# Patient Record
Sex: FEMALE | Race: WHITE | NOT HISPANIC OR LATINO | URBAN - METROPOLITAN AREA
[De-identification: names, ages, dates, MRNs, and addresses within clinical notes are randomized per-mention and may not be internally consistent; named-entity substitution may affect disease eponyms.]

---

## 2018-02-04 ENCOUNTER — EMERGENCY (EMERGENCY)
Facility: HOSPITAL | Age: 83
LOS: 1 days | Discharge: HOME | End: 2018-02-04
Attending: EMERGENCY MEDICINE | Admitting: EMERGENCY MEDICINE
Payer: MEDICARE

## 2018-02-04 VITALS
RESPIRATION RATE: 18 BRPM | SYSTOLIC BLOOD PRESSURE: 141 MMHG | DIASTOLIC BLOOD PRESSURE: 80 MMHG | OXYGEN SATURATION: 98 % | HEART RATE: 76 BPM | TEMPERATURE: 98 F

## 2018-02-04 VITALS
DIASTOLIC BLOOD PRESSURE: 70 MMHG | HEART RATE: 81 BPM | SYSTOLIC BLOOD PRESSURE: 168 MMHG | RESPIRATION RATE: 16 BRPM | OXYGEN SATURATION: 99 %

## 2018-02-04 LAB
ALBUMIN SERPL ELPH-MCNC: 3.7 G/DL — SIGNIFICANT CHANGE UP (ref 3.3–5)
ALP SERPL-CCNC: 105 U/L — SIGNIFICANT CHANGE UP (ref 40–120)
ALT FLD-CCNC: 18 U/L — SIGNIFICANT CHANGE UP (ref 4–33)
APTT BLD: 28.7 SEC — SIGNIFICANT CHANGE UP (ref 27.5–37.4)
AST SERPL-CCNC: 21 U/L — SIGNIFICANT CHANGE UP (ref 4–32)
BASOPHILS # BLD AUTO: 0.03 K/UL — SIGNIFICANT CHANGE UP (ref 0–0.2)
BASOPHILS NFR BLD AUTO: 0.3 % — SIGNIFICANT CHANGE UP (ref 0–2)
BILIRUB SERPL-MCNC: 0.4 MG/DL — SIGNIFICANT CHANGE UP (ref 0.2–1.2)
BUN SERPL-MCNC: 21 MG/DL — SIGNIFICANT CHANGE UP (ref 7–23)
CALCIUM SERPL-MCNC: 9.4 MG/DL — SIGNIFICANT CHANGE UP (ref 8.4–10.5)
CHLORIDE SERPL-SCNC: 98 MMOL/L — SIGNIFICANT CHANGE UP (ref 98–107)
CK MB BLD-MCNC: 2.3 NG/ML — SIGNIFICANT CHANGE UP (ref 1–4.7)
CK SERPL-CCNC: 28 U/L — SIGNIFICANT CHANGE UP (ref 25–170)
CO2 SERPL-SCNC: 28 MMOL/L — SIGNIFICANT CHANGE UP (ref 22–31)
CREAT SERPL-MCNC: 0.8 MG/DL — SIGNIFICANT CHANGE UP (ref 0.5–1.3)
EOSINOPHIL # BLD AUTO: 0.16 K/UL — SIGNIFICANT CHANGE UP (ref 0–0.5)
EOSINOPHIL NFR BLD AUTO: 1.8 % — SIGNIFICANT CHANGE UP (ref 0–6)
GLUCOSE SERPL-MCNC: 190 MG/DL — HIGH (ref 70–99)
HCT VFR BLD CALC: 45.8 % — HIGH (ref 34.5–45)
HGB BLD-MCNC: 14.8 G/DL — SIGNIFICANT CHANGE UP (ref 11.5–15.5)
IMM GRANULOCYTES # BLD AUTO: 0.03 # — SIGNIFICANT CHANGE UP
IMM GRANULOCYTES NFR BLD AUTO: 0.3 % — SIGNIFICANT CHANGE UP (ref 0–1.5)
INR BLD: 0.99 — SIGNIFICANT CHANGE UP (ref 0.88–1.17)
LYMPHOCYTES # BLD AUTO: 1.17 K/UL — SIGNIFICANT CHANGE UP (ref 1–3.3)
LYMPHOCYTES # BLD AUTO: 13.3 % — SIGNIFICANT CHANGE UP (ref 13–44)
MCHC RBC-ENTMCNC: 29.6 PG — SIGNIFICANT CHANGE UP (ref 27–34)
MCHC RBC-ENTMCNC: 32.3 % — SIGNIFICANT CHANGE UP (ref 32–36)
MCV RBC AUTO: 91.6 FL — SIGNIFICANT CHANGE UP (ref 80–100)
MONOCYTES # BLD AUTO: 0.38 K/UL — SIGNIFICANT CHANGE UP (ref 0–0.9)
MONOCYTES NFR BLD AUTO: 4.3 % — SIGNIFICANT CHANGE UP (ref 2–14)
NEUTROPHILS # BLD AUTO: 7.03 K/UL — SIGNIFICANT CHANGE UP (ref 1.8–7.4)
NEUTROPHILS NFR BLD AUTO: 80 % — HIGH (ref 43–77)
NRBC # FLD: 0 — SIGNIFICANT CHANGE UP
PLATELET # BLD AUTO: 252 K/UL — SIGNIFICANT CHANGE UP (ref 150–400)
PMV BLD: 10.3 FL — SIGNIFICANT CHANGE UP (ref 7–13)
POTASSIUM SERPL-MCNC: 4.6 MMOL/L — SIGNIFICANT CHANGE UP (ref 3.5–5.3)
POTASSIUM SERPL-SCNC: 4.6 MMOL/L — SIGNIFICANT CHANGE UP (ref 3.5–5.3)
PROT SERPL-MCNC: 7.3 G/DL — SIGNIFICANT CHANGE UP (ref 6–8.3)
PROTHROM AB SERPL-ACNC: 11 SEC — SIGNIFICANT CHANGE UP (ref 9.8–13.1)
RBC # BLD: 5 M/UL — SIGNIFICANT CHANGE UP (ref 3.8–5.2)
RBC # FLD: 14.6 % — HIGH (ref 10.3–14.5)
SODIUM SERPL-SCNC: 139 MMOL/L — SIGNIFICANT CHANGE UP (ref 135–145)
TROPONIN T SERPL-MCNC: < 0.06 NG/ML — SIGNIFICANT CHANGE UP (ref 0–0.06)
WBC # BLD: 8.8 K/UL — SIGNIFICANT CHANGE UP (ref 3.8–10.5)
WBC # FLD AUTO: 8.8 K/UL — SIGNIFICANT CHANGE UP (ref 3.8–10.5)

## 2018-02-04 PROCEDURE — 70450 CT HEAD/BRAIN W/O DYE: CPT | Mod: 26

## 2018-02-04 PROCEDURE — 71045 X-RAY EXAM CHEST 1 VIEW: CPT | Mod: 26

## 2018-02-04 PROCEDURE — 72170 X-RAY EXAM OF PELVIS: CPT | Mod: 26

## 2018-02-04 PROCEDURE — 12002 RPR S/N/AX/GEN/TRNK2.6-7.5CM: CPT

## 2018-02-04 PROCEDURE — 72125 CT NECK SPINE W/O DYE: CPT | Mod: 26

## 2018-02-04 PROCEDURE — 99285 EMERGENCY DEPT VISIT HI MDM: CPT | Mod: 25,GC

## 2018-02-04 RX ORDER — TETANUS TOXOID, REDUCED DIPHTHERIA TOXOID AND ACELLULAR PERTUSSIS VACCINE, ADSORBED 5; 2.5; 8; 8; 2.5 [IU]/.5ML; [IU]/.5ML; UG/.5ML; UG/.5ML; UG/.5ML
0.5 SUSPENSION INTRAMUSCULAR ONCE
Qty: 0 | Refills: 0 | Status: COMPLETED | OUTPATIENT
Start: 2018-02-04 | End: 2018-02-04

## 2018-02-04 RX ORDER — OXYCODONE AND ACETAMINOPHEN 5; 325 MG/1; MG/1
1 TABLET ORAL ONCE
Qty: 0 | Refills: 0 | Status: DISCONTINUED | OUTPATIENT
Start: 2018-02-04 | End: 2018-02-04

## 2018-02-04 RX ADMIN — OXYCODONE AND ACETAMINOPHEN 1 TABLET(S): 5; 325 TABLET ORAL at 18:46

## 2018-02-04 RX ADMIN — TETANUS TOXOID, REDUCED DIPHTHERIA TOXOID AND ACELLULAR PERTUSSIS VACCINE, ADSORBED 0.5 MILLILITER(S): 5; 2.5; 8; 8; 2.5 SUSPENSION INTRAMUSCULAR at 14:47

## 2018-02-04 NOTE — ED ADULT NURSE NOTE - OBJECTIVE STATEMENT
Pt received a&ox3, pt c/o fall earlier today, unknown origin, pt unable to recall what occurred, small lac noted to posterior aspect of pts head, md escobedo at bedside, pt denies pain, all extremities with full range of motion, pt denies cp/sob/cold symptoms/NVD/urinary symptoms before or after fall, pt appears comfortable, no bruising or deformities noted to body, pt appears to be in NAD, vs as reported, 20 gauge IV placed in left ac, labs drawn and sent, pt placed on monitor.

## 2018-02-04 NOTE — ED PROVIDER NOTE - PLAN OF CARE
-- Follow up with your primary doctor (or come back to the ED) within 48-72 hours for wound check.   -- Keep staples dry for 2 days, then clean with soap/water, and apply bacitracin once daily.  -- Return to ED for suture removal in 7-10 days.   -- Any increased pain, redness, streaking (red lines), swelling, fever, chills please immediately return to ER.

## 2018-02-04 NOTE — ED ADULT TRIAGE NOTE - CHIEF COMPLAINT QUOTE
p/t on Plavix,  c/o of lac to back of head s/p fall this afternoon neg loc p/t c/o of buttock pain as well

## 2018-02-04 NOTE — PROVIDER CONTACT NOTE (OTHER) - ASSESSMENT
Medical team referred this case as pt needs a ride home. Case was discussed with the medical team a informed pt needs BLS due to fall risk.  Writer called [facility] [phone] spoke with Bill  to set up ambulance service and pt will be picked up around 10: pm by senior care.  Medical team and pt's family was notified  time. Non Emergent ambulance form has been signed by MD and attached to the pts envelope for EMS.

## 2018-02-04 NOTE — ED PROVIDER NOTE - CARE PLAN
Principal Discharge DX:	Fall  Assessment and plan of treatment:	-- Follow up with your primary doctor (or come back to the ED) within 48-72 hours for wound check.   -- Keep staples dry for 2 days, then clean with soap/water, and apply bacitracin once daily.  -- Return to ED for suture removal in 7-10 days.   -- Any increased pain, redness, streaking (red lines), swelling, fever, chills please immediately return to ER.

## 2018-02-04 NOTE — ED PROVIDER NOTE - ATTENDING CONTRIBUTION TO CARE
Attending Note (Sushil): patient complaining of fall. unclear if this was syncope or not. patient denies prodromal symptoms but does not know how she fell.  posterior scalp laceration.  labs, ct head, wound repair, reassess.

## 2018-02-04 NOTE — ED PROVIDER NOTE - OBJECTIVE STATEMENT
89F h/o HTN, HLD, DM, CAD s/p stent on SA/plavix presents s/p fall. Was walking with walker into kitchen and fell backwards (unsure why),  tried catching her but he fell as well. Lac to occiput, denies additional injuries. Denies SOB, CP, Fever, palpitations, weakness, LOC, vomiting, back pain, neck pain. Was in usual state of health this morning. 89F h/o HTN, HLD, DM, CAD s/p stent on SA/plavix presents s/p fall. Was walking with walker into kitchen and fell backwards (unsure why),  tried catching her but he fell as well. Lac to occiput, denies additional injuries. Denies SOB, CP, Fever, palpitations, weakness, LOC, vomiting, back pain, neck pain. Was in usual state of health this morning. Unknown last tetanus

## 2018-02-15 ENCOUNTER — TRANSCRIPTION ENCOUNTER (OUTPATIENT)
Age: 83
End: 2018-02-15

## 2018-07-22 ENCOUNTER — EMERGENCY (EMERGENCY)
Facility: HOSPITAL | Age: 83
LOS: 1 days | Discharge: HOME | End: 2018-07-22
Attending: EMERGENCY MEDICINE | Admitting: EMERGENCY MEDICINE
Payer: MEDICARE

## 2018-07-22 VITALS
HEART RATE: 71 BPM | DIASTOLIC BLOOD PRESSURE: 82 MMHG | SYSTOLIC BLOOD PRESSURE: 150 MMHG | RESPIRATION RATE: 16 BRPM | OXYGEN SATURATION: 100 % | TEMPERATURE: 98 F

## 2018-07-22 LAB
ALBUMIN SERPL ELPH-MCNC: 4 G/DL — SIGNIFICANT CHANGE UP (ref 3.3–5)
ALP SERPL-CCNC: 129 U/L — HIGH (ref 40–120)
ALT FLD-CCNC: 33 U/L — SIGNIFICANT CHANGE UP (ref 10–45)
ANION GAP SERPL CALC-SCNC: 13 MMOL/L — SIGNIFICANT CHANGE UP (ref 5–17)
APTT BLD: 25.2 SEC — LOW (ref 27.5–37.4)
AST SERPL-CCNC: 25 U/L — SIGNIFICANT CHANGE UP (ref 10–40)
BASE EXCESS BLDV CALC-SCNC: 2.4 MMOL/L — HIGH (ref -2–2)
BASE EXCESS BLDV CALC-SCNC: 4.1 MMOL/L — HIGH (ref -2–2)
BILIRUB SERPL-MCNC: 0.4 MG/DL — SIGNIFICANT CHANGE UP (ref 0.2–1.2)
BLD GP AB SCN SERPL QL: NEGATIVE — SIGNIFICANT CHANGE UP
BUN SERPL-MCNC: 24 MG/DL — HIGH (ref 7–23)
CA-I SERPL-SCNC: 1.15 MMOL/L — SIGNIFICANT CHANGE UP (ref 1.12–1.3)
CA-I SERPL-SCNC: 1.18 MMOL/L — SIGNIFICANT CHANGE UP (ref 1.12–1.3)
CALCIUM SERPL-MCNC: 10.1 MG/DL — SIGNIFICANT CHANGE UP (ref 8.4–10.5)
CHLORIDE BLDV-SCNC: 103 MMOL/L — SIGNIFICANT CHANGE UP (ref 96–108)
CHLORIDE BLDV-SCNC: 104 MMOL/L — SIGNIFICANT CHANGE UP (ref 96–108)
CHLORIDE SERPL-SCNC: 98 MMOL/L — SIGNIFICANT CHANGE UP (ref 96–108)
CO2 BLDV-SCNC: 30 MMOL/L — SIGNIFICANT CHANGE UP (ref 22–30)
CO2 BLDV-SCNC: 32 MMOL/L — HIGH (ref 22–30)
CO2 SERPL-SCNC: 27 MMOL/L — SIGNIFICANT CHANGE UP (ref 22–31)
CREAT SERPL-MCNC: 0.76 MG/DL — SIGNIFICANT CHANGE UP (ref 0.5–1.3)
GAS PNL BLDV: 135 MMOL/L — LOW (ref 136–145)
GAS PNL BLDV: 137 MMOL/L — SIGNIFICANT CHANGE UP (ref 136–145)
GAS PNL BLDV: SIGNIFICANT CHANGE UP
GLUCOSE BLDV-MCNC: 189 MG/DL — HIGH (ref 70–99)
GLUCOSE BLDV-MCNC: 202 MG/DL — HIGH (ref 70–99)
GLUCOSE SERPL-MCNC: 212 MG/DL — HIGH (ref 70–99)
HCO3 BLDV-SCNC: 29 MMOL/L — SIGNIFICANT CHANGE UP (ref 21–29)
HCO3 BLDV-SCNC: 31 MMOL/L — HIGH (ref 21–29)
HCT VFR BLD CALC: 44.1 % — SIGNIFICANT CHANGE UP (ref 34.5–45)
HCT VFR BLDA CALC: 39 % — SIGNIFICANT CHANGE UP (ref 39–50)
HCT VFR BLDA CALC: 47 % — SIGNIFICANT CHANGE UP (ref 39–50)
HGB BLD CALC-MCNC: 12.6 G/DL — SIGNIFICANT CHANGE UP (ref 11.5–15.5)
HGB BLD CALC-MCNC: 15.4 G/DL — SIGNIFICANT CHANGE UP (ref 11.5–15.5)
HGB BLD-MCNC: 14.4 G/DL — SIGNIFICANT CHANGE UP (ref 11.5–15.5)
HOROWITZ INDEX BLDV+IHG-RTO: SIGNIFICANT CHANGE UP
INR BLD: 0.99 RATIO — SIGNIFICANT CHANGE UP (ref 0.88–1.16)
LACTATE BLDV-MCNC: 2.4 MMOL/L — HIGH (ref 0.7–2)
LACTATE BLDV-MCNC: 2.5 MMOL/L — HIGH (ref 0.7–2)
MCHC RBC-ENTMCNC: 30.1 PG — SIGNIFICANT CHANGE UP (ref 27–34)
MCHC RBC-ENTMCNC: 32.7 GM/DL — SIGNIFICANT CHANGE UP (ref 32–36)
MCV RBC AUTO: 92.3 FL — SIGNIFICANT CHANGE UP (ref 80–100)
PCO2 BLDV: 55 MMHG — HIGH (ref 35–50)
PCO2 BLDV: 56 MMHG — HIGH (ref 35–50)
PH BLDV: 7.34 — LOW (ref 7.35–7.45)
PH BLDV: 7.36 — SIGNIFICANT CHANGE UP (ref 7.35–7.45)
PLATELET # BLD AUTO: 203 K/UL — SIGNIFICANT CHANGE UP (ref 150–400)
PO2 BLDV: 13 MMHG — LOW (ref 25–45)
PO2 BLDV: 15 MMHG — LOW (ref 25–45)
POTASSIUM BLDV-SCNC: 4.6 MMOL/L — SIGNIFICANT CHANGE UP (ref 3.5–5.3)
POTASSIUM BLDV-SCNC: 4.7 MMOL/L — SIGNIFICANT CHANGE UP (ref 3.5–5.3)
POTASSIUM SERPL-MCNC: 5.6 MMOL/L — HIGH (ref 3.5–5.3)
POTASSIUM SERPL-SCNC: 5.6 MMOL/L — HIGH (ref 3.5–5.3)
PROT SERPL-MCNC: 8.1 G/DL — SIGNIFICANT CHANGE UP (ref 6–8.3)
PROTHROM AB SERPL-ACNC: 10.8 SEC — SIGNIFICANT CHANGE UP (ref 9.8–12.7)
RBC # BLD: 4.78 M/UL — SIGNIFICANT CHANGE UP (ref 3.8–5.2)
RBC # FLD: 13.2 % — SIGNIFICANT CHANGE UP (ref 10.3–14.5)
RH IG SCN BLD-IMP: POSITIVE — SIGNIFICANT CHANGE UP
SAO2 % BLDV: 11 % — LOW (ref 67–88)
SAO2 % BLDV: 12 % — LOW (ref 67–88)
SODIUM SERPL-SCNC: 138 MMOL/L — SIGNIFICANT CHANGE UP (ref 135–145)
WBC # BLD: 11 K/UL — HIGH (ref 3.8–10.5)
WBC # FLD AUTO: 11 K/UL — HIGH (ref 3.8–10.5)

## 2018-07-22 PROCEDURE — 93010 ELECTROCARDIOGRAM REPORT: CPT

## 2018-07-22 PROCEDURE — 70450 CT HEAD/BRAIN W/O DYE: CPT | Mod: 26

## 2018-07-22 PROCEDURE — 71045 X-RAY EXAM CHEST 1 VIEW: CPT | Mod: 26

## 2018-07-22 PROCEDURE — 99284 EMERGENCY DEPT VISIT MOD MDM: CPT | Mod: GC,25

## 2018-07-22 RX ORDER — TETANUS TOXOID, REDUCED DIPHTHERIA TOXOID AND ACELLULAR PERTUSSIS VACCINE, ADSORBED 5; 2.5; 8; 8; 2.5 [IU]/.5ML; [IU]/.5ML; UG/.5ML; UG/.5ML; UG/.5ML
0.5 SUSPENSION INTRAMUSCULAR ONCE
Refills: 0 | Status: COMPLETED | OUTPATIENT
Start: 2018-07-22 | End: 2018-07-22

## 2018-07-22 RX ORDER — LEVETIRACETAM 250 MG/1
1000 TABLET, FILM COATED ORAL ONCE
Refills: 0 | Status: COMPLETED | OUTPATIENT
Start: 2018-07-22 | End: 2018-07-22

## 2018-07-22 RX ORDER — ONDANSETRON 8 MG/1
4 TABLET, FILM COATED ORAL ONCE
Refills: 0 | Status: COMPLETED | OUTPATIENT
Start: 2018-07-22 | End: 2018-07-22

## 2018-07-22 RX ADMIN — TETANUS TOXOID, REDUCED DIPHTHERIA TOXOID AND ACELLULAR PERTUSSIS VACCINE, ADSORBED 0.5 MILLILITER(S): 5; 2.5; 8; 8; 2.5 SUSPENSION INTRAMUSCULAR at 20:19

## 2018-07-22 RX ADMIN — ONDANSETRON 4 MILLIGRAM(S): 8 TABLET, FILM COATED ORAL at 20:19

## 2018-07-22 RX ADMIN — LEVETIRACETAM 400 MILLIGRAM(S): 250 TABLET, FILM COATED ORAL at 21:10

## 2018-07-22 NOTE — ED PROVIDER NOTE - PHYSICAL EXAMINATION
Charlotte Antonio, DO:   Gen: Well appearing, NAD  Head: NCAT  HEENT: PERRL, MMM, normal conjunctiva, anicteric, neck supple  Lung: CTAB, no adventitious sounds  CV: RRR  Abd: soft, NTND, no rebound or guarding  MSK: No edema, no visible deformities  Neuro: CN II-XII intact. 5/5 strength and normal sensation in all extremities. No drift. No dysmetria. AAOx3.   Skin: Warm and dry, no evidence of rash  Psych: normal mood and affect

## 2018-07-22 NOTE — CONSULT NOTE ADULT - SUBJECTIVE AND OBJECTIVE BOX
p ([id number])     HPI: 89F with hx of multiple falls on ASA 81mg & Plavix for cardiac stent here for fall while ambulating to bathroom. Unwitnessed, heard by aide. Ambulatory since incident. Unknown if she was walking with walker. Remembers fall. No LOC. No nausea/vomiting. Last tetanus unknown.     PAST MEDICAL HISTORY     PAST SURGICAL HISTORY         MEDICATIONS:  Antibiotics:    Neuro:    Anticoagulation:    Other:  diphtheria/tetanus/pertussis (acellular) Vaccine (ADAcel) 0.5 milliLiter(s) IntraMuscular once      SOCIAL HISTORY:   Occupation:   Marital Status:     FAMILY HISTORY:      REVIEW OF SYSTEMS:  Check here if all are normal other than Neurological [x]  General:  Eyes:  ENT:  Cardiac:  Respiratory:  GI:  Musculoskeletal:   Skin:  Neurologic:   Psychiatric:     PHYSICAL EXAMINATION:   T(C): --  HR: --  BP: --  RR: --  SpO2: --  Wt(kg): --    General Examination:     Neurologic Examination:           AOx3, FC, PERRL, EOMI, V1-3 intact, no facial, palate debbie symmetric, tongue midline, shrug 5/5  5/5 throughout, no drift  SILT  No clonus or babinski      LABS:                RADIOLOGY & ADDITIONAL STUDIES:

## 2018-07-22 NOTE — ED PROVIDER NOTE - OBJECTIVE STATEMENT
Charlotte Marisela, DO: 89F with hx of multiple falls on ASA 81mg & Plavix for cardiac stent here for fall while ambulating to bathroom. Unwitnessed, heard by aide. Ambulatory since incident. Unknown if she was walking with walker. Remembers fall. No LOC. No nausea/vomiting. Last tetanus unknown.

## 2018-07-22 NOTE — ED PROVIDER NOTE - PROGRESS NOTE DETAILS
Kenneth Rose PGY2: d/w trauma - pt still desats w/out obvious increased wob - not describing chest pain - trauma will discuss w/ their attg, will f/u with them Charlotte Antonio DO: neurosurgery aware of stable repeat CT. patient to follow up with dr. morgan o/p on Keppra BID. Kenneth Rose PGY2: pt has remained desatting however still no respiratory distress, increased wob, lightheadedness; paged trauma to f/u recs Kenneth Rose PGY2: pt has remained desatting however still no respiratory distress, increased wob; per trauma nothing to do emergently; pt still reports mild lightheadedness, ambulating unsteadily (ambulates w/ walker at home) [place]: pt signed out to me pending repeat CTH head. pt had mechanical fall. repeat CTH showing stable small SAH. patient reassessed, has no complaints at this time. ambulating aroudn the ER with no complaints and stable gait. neuro intact. pt has 24 hour aide. pt cleared by NSG and trauma for discharge. ptient discharged with instructions to take tylenol fo rpain, keppra prophylactically, and f/u with pmd and nsg in 1-2 dyas for rpt eval /further management    The patient was discharged from the ED in stable condition. All results of today's workup were discussed with the patient and all questions/concerns were addressed. All discharge instructions were thoroughly discussed with the patient, as well as important warning signs and new/ worsening symptoms which should necessitate patient's immediate return to the ED. The patient is agreeable with discharge and expresses full understanding of all instructions given.

## 2018-07-22 NOTE — CONSULT NOTE ADULT - ATTENDING COMMENTS
I reviewed the resident's note and agree. The patient is a 89-year-old female on ASA and Plavix s/p fall, found to have left traumatic subarachnoid hemorrhage. The patient is currently neurologically stable. No acute neurosurgical intervention is indicated at this time. Recommend a repeat CT of the head without contrast to assess interval stability.

## 2018-07-22 NOTE — ED PROVIDER NOTE - ATTENDING CONTRIBUTION TO CARE
------------ATTENDING NOTE------------   pt w/ aide c/o mechanical witnessed fall today, hit back of head on statue, no loss of consciousness, had bleeding to posterior scalp laceration, no AMS and acting likely self per aide (has baseline dementia), ambulatory since accident w/o pain/complaints, CTL spine nontender and FROM w/o pain, stable chest w/o tenderness/crepitus and breathing comfortable, soft benign abd w/o pain/tenderness, no additional signs of trauma, complicated by Plavix use, HD stable on ED arrival -->  - Harshal Templeton MD   ----------------------------------------------- ------------ATTENDING NOTE------------   pt w/ aide c/o mechanical witnessed fall today, hit back of head on statue, no loss of consciousness, had bleeding to posterior scalp laceration, no AMS and acting likely self per aide ambulatory since accident w/o pain/complaints, CTL spine nontender and FROM w/o pain, stable chest w/o tenderness/crepitus and breathing comfortable, soft benign abd w/o pain/tenderness, no additional signs of trauma, complicated by Plavix use, HD stable on ED arrival -->  - Harshal Templeton MD   ----------------------------------------------- ------------ATTENDING NOTE------------   pt w/ aide c/o mechanical witnessed fall today, hit back of head on statue, no loss of consciousness, had bleeding to posterior scalp laceration, no AMS and acting likely self per aide ambulatory since accident w/o pain/complaints, CTL spine nontender and FROM w/o pain, stable chest w/o tenderness/crepitus and breathing comfortable, soft benign abd w/o pain/tenderness, no additional signs of trauma, complicated by Plavix use, HD stable on ED arrival --> CT w/ SAH, NSGY at bedside, stable -->  - Harshal Templeton MD   ----------------------------------------------- ------------ATTENDING NOTE------------   pt w/ aide c/o mechanical witnessed fall today, hit back of head on statue, no loss of consciousness, had bleeding to posterior scalp laceration, no AMS and acting likely self per aide ambulatory since accident w/o pain/complaints, CTL spine nontender and FROM w/o pain, stable chest w/o tenderness/crepitus and breathing comfortable, soft benign abd w/o pain/tenderness, no additional signs of trauma, complicated by Plavix use, HD stable on ED arrival --> CT w/ SAH, NSGY at bedside, stable --> (20:18) feels need for BM, BP decreased to 80's/50's and elda 40's, resolved shortly -->  - Harshal Templeton MD   ----------------------------------------------- ------------ATTENDING NOTE------------   pt w/ aide c/o mechanical witnessed fall today, hit back of head on statue, no loss of consciousness, had bleeding to posterior scalp laceration, no AMS and acting likely self per aide ambulatory since accident w/o pain/complaints, CTL spine nontender and FROM w/o pain, stable chest w/o tenderness/crepitus and breathing comfortable, soft benign abd w/o pain/tenderness, no additional signs of trauma, complicated by Plavix use, HD stable on ED arrival --> CT w/ SAH, NSGY at bedside, stable --> (20:18) feels need for BM, BP decreased to 80's/50's and elda 40's, resolved shortly --> remaining HD stable, evaluated by Trauma, maintaining nml mental status / neuro exam, ED Sign Out 2300 ([place]) pending repeat CT Head, reassessment, likely dc w/ close outpt fu if wnl.  - Harshal Templeton MD   -----------------------------------------------

## 2018-07-22 NOTE — CONSULT NOTE ADULT - ASSESSMENT
89 yo female sp fall on asa/plavix.  Small l tsah on CTH.  No ha/n/v.  Repeat CTH in 4-6 hours, keppra 500 bid 5 days, may fu patient 1-2 weeks. If cth stable then may fu outpatient.

## 2018-07-22 NOTE — ED PROVIDER NOTE - CARE PLAN
Principal Discharge DX:	Closed head injury without loss of consciousness, initial encounter  Secondary Diagnosis:	Scalp laceration, initial encounter Principal Discharge DX:	Subarachnoid hemorrhage  Secondary Diagnosis:	Scalp laceration, initial encounter

## 2018-07-22 NOTE — ED ADULT NURSE REASSESSMENT NOTE - NS ED NURSE REASSESS COMMENT FT1
trauma resident present repairing posterior head lac, vitals stable, pvt aide present, awaiting repeat head CT, adm pending, keppra infusing

## 2018-07-22 NOTE — ED ADULT NURSE NOTE - OBJECTIVE STATEMENT
89 yr old female by EMS from home accompanied by pvt aide 89 yr old female by EMS from home accompanied by pvt aide s/p witnessed fall, was amb in living room, lost balance, hit head against [place] statue, fell to the floor, no LOC, +on plavis, +ambualtory on scene, no other c/o, on arrival to ED, pt A&Ox3, following commands, clear speech, no facial asymmetry, vitals stable, shortly after returning from head CT, pt became pale, c/o nausea, hypotensive, NS infusion started immediately, c/o "I need to move my bowels", MD notified --> at bedside 89 yr old female by EMS from home accompanied by pvt aide s/p witnessed fall, +head dressing for lac in place, was amb in living room, witnessed by day shift pvt aide, lost balance, hit head against [place] statue, fell to the floor, no LOC, +on plavis, +ambualtory on scene, no other c/o, on arrival to ED, pt A&Ox3, following commands, clear speech, no facial asymmetry, vitals stable, shortly after returning from head CT, pt became pale, c/o nausea, hypotensive, NS infusion started immediately, c/o "I need to move my bowels", MD notified --> at bedside, symptoms improved shortly afterwards, color improved, BP improved, zofran given, +active L posterior head bleed, +soaking through dressing placed by EMS, L inferior chin region ecchymosis, R lower leg ecchymosis noted, dry blood on back of neck 89 yr old female by EMS from home accompanied by pvt aide s/p witnessed fall, +head dressing for lac in place, was amb in living room, witnessed by day shift pvt aide, lost balance, hit head against [place] statue, fell to the floor, no LOC, +on plavis, +ambualtory on scene, no other c/o, on arrival to ED, pt A&Ox3, following commands, clear speech, no facial asymmetry, vitals stable, shortly after returning from head CT, pt became pale, c/o nausea, hypotensive, NS infusion started immediately, c/o "I need to move my bowels", MD notified --> at bedside, symptoms improved shortly afterwards, color improved, BP improved, zofran given, +active L posterior head bleed, +soaking through dressing placed by EMS, L inferior chin region ecchymosis, R lower leg ecchymosis noted, dry blood on back of neck, +urinated in panties --> pt changed, placed in diaper, repositioned in bed for comfort

## 2018-07-23 ENCOUNTER — INPATIENT (INPATIENT)
Facility: HOSPITAL | Age: 83
LOS: 2 days | Discharge: HOME HEALTH | DRG: 312 | End: 2018-07-26
Attending: INTERNAL MEDICINE | Admitting: INTERNAL MEDICINE
Payer: MEDICARE

## 2018-07-23 VITALS
HEART RATE: 67 BPM | OXYGEN SATURATION: 96 % | RESPIRATION RATE: 16 BRPM | SYSTOLIC BLOOD PRESSURE: 136 MMHG | DIASTOLIC BLOOD PRESSURE: 76 MMHG

## 2018-07-23 VITALS
DIASTOLIC BLOOD PRESSURE: 68 MMHG | RESPIRATION RATE: 18 BRPM | HEART RATE: 68 BPM | WEIGHT: 100.09 LBS | SYSTOLIC BLOOD PRESSURE: 112 MMHG
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
## 2018-07-23 LAB
ALBUMIN SERPL ELPH-MCNC: 3.5 G/DL — SIGNIFICANT CHANGE UP (ref 3.3–5)
ALP SERPL-CCNC: 100 U/L — SIGNIFICANT CHANGE UP (ref 40–120)
ALT FLD-CCNC: 22 U/L — SIGNIFICANT CHANGE UP (ref 10–45)
ANION GAP SERPL CALC-SCNC: 13 MMOL/L — SIGNIFICANT CHANGE UP (ref 5–17)
APTT BLD: 26.3 SEC — LOW (ref 27.5–37.4)
AST SERPL-CCNC: 20 U/L — SIGNIFICANT CHANGE UP (ref 10–40)
BASOPHILS # BLD AUTO: 0 K/UL — SIGNIFICANT CHANGE UP (ref 0–0.2)
BASOPHILS NFR BLD AUTO: 0.3 % — SIGNIFICANT CHANGE UP (ref 0–2)
BILIRUB SERPL-MCNC: 0.5 MG/DL — SIGNIFICANT CHANGE UP (ref 0.2–1.2)
BUN SERPL-MCNC: 27 MG/DL — HIGH (ref 7–23)
CALCIUM SERPL-MCNC: 9 MG/DL — SIGNIFICANT CHANGE UP (ref 8.4–10.5)
CHLORIDE SERPL-SCNC: 98 MMOL/L — SIGNIFICANT CHANGE UP (ref 96–108)
CO2 SERPL-SCNC: 26 MMOL/L — SIGNIFICANT CHANGE UP (ref 22–31)
CREAT SERPL-MCNC: 0.74 MG/DL — SIGNIFICANT CHANGE UP (ref 0.5–1.3)
EOSINOPHIL # BLD AUTO: 0 K/UL — SIGNIFICANT CHANGE UP (ref 0–0.5)
EOSINOPHIL NFR BLD AUTO: 0.4 % — SIGNIFICANT CHANGE UP (ref 0–6)
GLUCOSE SERPL-MCNC: 202 MG/DL — HIGH (ref 70–99)
HCT VFR BLD CALC: 34.6 % — SIGNIFICANT CHANGE UP (ref 34.5–45)
HGB BLD-MCNC: 11.6 G/DL — SIGNIFICANT CHANGE UP (ref 11.5–15.5)
INR BLD: 1.09 RATIO — SIGNIFICANT CHANGE UP (ref 0.88–1.16)
LYMPHOCYTES # BLD AUTO: 1.5 K/UL — SIGNIFICANT CHANGE UP (ref 1–3.3)
LYMPHOCYTES # BLD AUTO: 12.4 % — LOW (ref 13–44)
MCHC RBC-ENTMCNC: 31.1 PG — SIGNIFICANT CHANGE UP (ref 27–34)
MCHC RBC-ENTMCNC: 33.6 GM/DL — SIGNIFICANT CHANGE UP (ref 32–36)
MCV RBC AUTO: 92.5 FL — SIGNIFICANT CHANGE UP (ref 80–100)
MONOCYTES # BLD AUTO: 0.7 K/UL — SIGNIFICANT CHANGE UP (ref 0–0.9)
MONOCYTES NFR BLD AUTO: 5.4 % — SIGNIFICANT CHANGE UP (ref 2–14)
NEUTROPHILS # BLD AUTO: 10 K/UL — HIGH (ref 1.8–7.4)
NEUTROPHILS NFR BLD AUTO: 81.6 % — HIGH (ref 43–77)
PLATELET # BLD AUTO: 216 K/UL — SIGNIFICANT CHANGE UP (ref 150–400)
POTASSIUM SERPL-MCNC: 5.4 MMOL/L — HIGH (ref 3.5–5.3)
POTASSIUM SERPL-SCNC: 5.4 MMOL/L — HIGH (ref 3.5–5.3)
PROT SERPL-MCNC: 6.8 G/DL — SIGNIFICANT CHANGE UP (ref 6–8.3)
PROTHROM AB SERPL-ACNC: 11.8 SEC — SIGNIFICANT CHANGE UP (ref 9.8–12.7)
RBC # BLD: 3.74 M/UL — LOW (ref 3.8–5.2)
RBC # FLD: 12.9 % — SIGNIFICANT CHANGE UP (ref 10.3–14.5)
SODIUM SERPL-SCNC: 137 MMOL/L — SIGNIFICANT CHANGE UP (ref 135–145)
WBC # BLD: 12.3 K/UL — HIGH (ref 3.8–10.5)
WBC # FLD AUTO: 12.3 K/UL — HIGH (ref 3.8–10.5)

## 2018-07-23 PROCEDURE — 12011 RPR F/E/E/N/L/M 2.5 CM/<: CPT | Mod: GC

## 2018-07-23 PROCEDURE — 70450 CT HEAD/BRAIN W/O DYE: CPT | Mod: 26

## 2018-07-23 PROCEDURE — 70450 CT HEAD/BRAIN W/O DYE: CPT | Mod: 26,77

## 2018-07-23 PROCEDURE — 72125 CT NECK SPINE W/O DYE: CPT | Mod: 26

## 2018-07-23 PROCEDURE — 99285 EMERGENCY DEPT VISIT HI MDM: CPT | Mod: 25,GC

## 2018-07-23 RX ORDER — ATORVASTATIN CALCIUM 80 MG/1
10 TABLET, FILM COATED ORAL AT BEDTIME
Refills: 0 | Status: DISCONTINUED | OUTPATIENT
Start: 2018-07-23 | End: 2018-07-23

## 2018-07-23 RX ORDER — ASPIRIN/CALCIUM CARB/MAGNESIUM 324 MG
81 TABLET ORAL DAILY
Refills: 0 | Status: DISCONTINUED | OUTPATIENT
Start: 2018-07-23 | End: 2018-07-23

## 2018-07-23 RX ORDER — METOPROLOL TARTRATE 50 MG
25 TABLET ORAL DAILY
Refills: 0 | Status: DISCONTINUED | OUTPATIENT
Start: 2018-07-23 | End: 2018-07-23

## 2018-07-23 RX ORDER — ATORVASTATIN CALCIUM 80 MG/1
10 TABLET, FILM COATED ORAL AT BEDTIME
Refills: 0 | Status: DISCONTINUED | OUTPATIENT
Start: 2018-07-23 | End: 2018-07-26

## 2018-07-23 RX ORDER — ACETAMINOPHEN 500 MG
1000 TABLET ORAL ONCE
Refills: 0 | Status: COMPLETED | OUTPATIENT
Start: 2018-07-23 | End: 2018-07-23

## 2018-07-23 RX ORDER — CEFTRIAXONE 500 MG/1
1 INJECTION, POWDER, FOR SOLUTION INTRAMUSCULAR; INTRAVENOUS EVERY 24 HOURS
Refills: 0 | Status: DISCONTINUED | OUTPATIENT
Start: 2018-07-23 | End: 2018-07-23

## 2018-07-23 RX ORDER — CEFTRIAXONE 500 MG/1
1 INJECTION, POWDER, FOR SOLUTION INTRAMUSCULAR; INTRAVENOUS EVERY 24 HOURS
Refills: 0 | Status: DISCONTINUED | OUTPATIENT
Start: 2018-07-23 | End: 2018-07-25

## 2018-07-23 RX ORDER — HEPARIN SODIUM 5000 [USP'U]/ML
5000 INJECTION INTRAVENOUS; SUBCUTANEOUS EVERY 12 HOURS
Refills: 0 | Status: DISCONTINUED | OUTPATIENT
Start: 2018-07-23 | End: 2018-07-26

## 2018-07-23 RX ORDER — LEVETIRACETAM 250 MG/1
500 TABLET, FILM COATED ORAL
Refills: 0 | Status: DISCONTINUED | OUTPATIENT
Start: 2018-07-23 | End: 2018-07-26

## 2018-07-23 RX ADMIN — Medication 400 MILLIGRAM(S): at 14:29

## 2018-07-23 RX ADMIN — ATORVASTATIN CALCIUM 10 MILLIGRAM(S): 80 TABLET, FILM COATED ORAL at 22:23

## 2018-07-23 RX ADMIN — Medication 1000 MILLIGRAM(S): at 15:34

## 2018-07-23 RX ADMIN — CEFTRIAXONE 100 GRAM(S): 500 INJECTION, POWDER, FOR SOLUTION INTRAMUSCULAR; INTRAVENOUS at 22:23

## 2018-07-23 NOTE — H&P ADULT - NSHPPHYSICALEXAM_GEN_ALL_CORE
PHYSICAL EXAMINATION:  Vital Signs Last 24 Hrs  T(C): 36.5 (23 Jul 2018 16:46), Max: 36.9 (23 Jul 2018 02:05)  T(F): 97.7 (23 Jul 2018 16:46), Max: 98.4 (23 Jul 2018 02:05)  HR: 87 (23 Jul 2018 20:42) (67 - 87)  BP: 112/64 (23 Jul 2018 20:42) (111/76 - 149/78)  BP(mean): --  RR: 17 (23 Jul 2018 20:42) (16 - 18)  SpO2: 98% (23 Jul 2018 20:42) (95% - 98%)  CAPILLARY BLOOD GLUCOSE            GENERAL: NAD, well-groomed,  HEAD:  normocephalic  EYES: sclera anicteric  ENMT: mucous membranes moist  NECK: supple, No JVD  CHEST/LUNG: clear to auscultation bilaterally;    no      rales   ,   no rhonchi,   HEART: normal S1, S2  ABDOMEN: BS+, soft, ND, NT   EXTREMITIES:    no    edema    b/l LEs  NEURO: awake, ,     moves all extremities  ecchymoses,  forehead.  bluish hematoma  SKIN: no     rash PHYSICAL EXAMINATION:  Vital Signs Last 24 Hrs  T(C): 36.5 (23 Jul 2018 16:46), Max: 36.9 (23 Jul 2018 02:05)  T(F): 97.7 (23 Jul 2018 16:46), Max: 98.4 (23 Jul 2018 02:05)  HR: 87 (23 Jul 2018 20:42) (67 - 87)  BP: 112/64 (23 Jul 2018 20:42) (111/76 - 149/78)  BP(mean): --  RR: 17 (23 Jul 2018 20:42) (16 - 18)  SpO2: 98% (23 Jul 2018 20:42) (95% - 98%)  CAPILLARY BLOOD GLUCOSE            GENERAL: NAD, well-groomed,  HEAD:  normocephalic  EYES: sclera anicteric  ENMT: mucous membranes moist  NECK: supple, No JVD  CHEST/LUNG: clear to auscultation bilaterally;    no      rales   ,   no rhonchi,   HEART: normal S1, S2  ABDOMEN: BS+, soft, ND, NT   EXTREMITIES:    no    edema    b/l LEs  NEURO: awake, ,     moves all extremities  ecchymoses,  forehead.  bluish hematoma, staples  head  SKIN: no     rash

## 2018-07-23 NOTE — H&P ADULT - HISTORY OF PRESENT ILLNESS
International Travel? No(1)     89y Female complaining of head injury.	  89 yo F who presented s/p mechanical fall/  syncope,  details of  fall unclear to pt.  fecility, . Pt states she was going to the bathroom,  and   rhodes s not  know  how, she ,  and was  found  on floort the L side of her head on the floor.  Denies , chest pain or palpitations prior to the fall.  No loss of bowel or bladder function.  Pt is on plavix.,  seen in er, daughter  at  bedside International Travel? No(1)     89y Female complaining of head injury.	  89 yo F who presented s/p mechanical fall/  syncope,   . Pt states she was going to the bathroom,  and   rhodes s not  know  how, she ,  and was  found  on floort the L side of her head on the floor.  Denies , chest pain or palpitations prior to the fall.  No loss of bowel or bladder function. International Travel? No(1)     89y Female complaining of head injury.	  89 yo F who presented s/p mechanical fall/  syncope,   . Pt states she was going to the bathroom,  and   rhodes s not  know  how, she ,  and was  found  on floort the L side of her head on the floor. had another  fall yesterday, dc  from   er  yesterday  Denies , chest pain or palpitations prior to the fall.  No loss of bowel or bladder function.

## 2018-07-23 NOTE — CONSULT NOTE ADULT - ASSESSMENT
Assessment/Plan: 89y Female on ASA/Plavix s/p unwitnessed fall found to have a small SAH.   SAH appears stable on repeat CTH done 4 hours after initial scan  No neurological deficits. Scalp laceration repaired with staple.    - Follow up neurosurgery recommendations regarding SAH and anticoagulation plan  - No further trauma surgery workup indicated      Discussed with Dr. Su  Pager [phone]

## 2018-07-23 NOTE — ED PROCEDURE NOTE - ATTENDING CONTRIBUTION TO CARE
I have participated in and supervised all key portions of the above procedures and agree with the above documentation. SARITHA Johnson MD

## 2018-07-23 NOTE — ED PROCEDURE NOTE - CPROC ED LACERATION CLEANSED1
cleansed/copious irrigation/extensive cleaning/removal of particulate matter/irrigated (see in FT below)

## 2018-07-23 NOTE — ED PROVIDER NOTE - ATTENDING CONTRIBUTION TO CARE
Patient BIBEMS after fall with head trauma.  Seen in Emergency Department last night for fall, had CT head with small bleed, stable on repeat CT, cleared by trauma and neurosurgery.  Today had repeat fall striking forehead. Patient BIBEMS after fall with head trauma.  Seen in Emergency Department last night for fall, had CT head with small bleed, stable on repeat CT, cleared by trauma and neurosurgery.  Today had repeat fall striking forehead.  No LOC.  On plavix.    On exam patient vital signs within normal limits, GCS 15, small laceration to L forehead above eyebrown with small oozing blood, old hematoma to posterior occiput, no other point tenderness, no other traumatic injuries appreciated.    Patient with two falls with head injury in 24 hours, will repeat labs and CT head, reconsult neurosurgery or trauma if new or interval head bleed appreciated.  Will require admission for PT eval and possible placement as unsafe discharge given 2 falls with head injury in 24 hours.

## 2018-07-23 NOTE — ED ADULT NURSE NOTE - OBJECTIVE STATEMENT
89y Female PMH DM, htn presents to the ED via EMS with bandage to head c/o laceration s/p mechanical fall. Per EMS pt had a fall yesterday and was brought to [facility] where they stapled laceration to the back of the head and was discharged at 4am. Pt is on Plavix. Pt then had another mechanical fall around 11am today at home, denies LOC. Pt has aides at home who states she tripped and fell while using a walker onto a hard wood floor and has a new laceration above her L. eyebrow, which the aide bandaged up prior to EMS arrival. Pt reporting mild L. sided headache. Pt presents a&oX3, airway intact, breathing spontaneously and unlabored, 89y Female PMH DM, htn, 1 stent presents to the ED via EMS with bandage to head c/o laceration s/p mechanical fall. Per EMS pt had a fall yesterday and was brought to [facility] where they stapled laceration to the back of the head and was discharged at 4am. Pt is on Plavix. Pt then had another mechanical fall around 11am today at home, denies LOC. Pt has aides at home who states she tripped and fell while using a walker onto a hard wood floor and has a new laceration above her L. eyebrow, which the aide bandaged up prior to EMS arrival. Pt reporting mild L. sided headache. Pt presents a&oX3, airway intact, breathing spontaneously and unlabored, gross neuro intact- no slurred speech or facial droop noted, dried blood and staples noted to laceration on back of head- no active bleeding, laceration noted above L. eye with no active bleeding noted at this time, ecchymosis noted under L. eye and under chin on L. side, skin warm and dry, cap refill <2 seconds, pt had full ROM to all extremities, no obvious defmorities not denies dizziness, CP, palpitations, SOB, numbness/tingling to extremities, n/v. MD at bedside for eval. Safety maintained.

## 2018-07-23 NOTE — H&P ADULT - ASSESSMENT
pt with  htn, dementia,   admitted with  syncope/  found  to have hematoma/  bruise  on forehead  pt  ha s no recollection of events   tele   lopressor, daily  dvt ppx   labs in am   PT eval  mild elevated wbc, rocephin, follow  urine  c/s  daughter at  bedside pt with  htn,   admitted with  syncope/  found  to have hematoma/  bruise  on forehead  was  dc  from  er, yesterday after  a fall   tele  dvt ppx   labs in am   PT eval  mild elevated wbc, rocephin, follow  urine  c/s  s/p fall other  day . seen by n/surgery in  er yesterday   and  d/c  , now returns with  another fall,   keppra bid    < from: CT Head No Cont (07.23.18 @ 13:06) >  MPRESSION: Mild degenerative changes. No acute fractures or dislocations.      < end of copied text > pt with  htn,  cad /  stents, mote  than 3  years   ago pe r    admitted with  syncope/  found  to have hematoma/  bruise  on forehead  was  dc  from  er, yesterday after  a fall/  ct with  sah/ traumatic   tele,  was  seen by n/surgery yesterday  dvt ppx   labs in am   PT eval  mild elevated wbc, rocephin, follow  urine  c/s  s/p fall other  day . seen by n/surgery in  er yesterday   and  d/c  , now returns with  another fall,   keppra bid  stop  asa/ plavix, with h/o  SAH,  stents  was  placed  more than 3  yrs  ago  staples  on head    < from: CT Head No Cont (07.23.18 @ 13:06) >  MPRESSION: Mild degenerative changes. No acute fractures or dislocations.      < end of copied text >

## 2018-07-23 NOTE — ED ADULT NURSE REASSESSMENT NOTE - NS ED NURSE REASSESS COMMENT FT1
Pt brought to CT, stable
Pt medicated for pain per MD Silver orders, pt currently sleeping comfortably in bed in no apparent distress. Daughter at bedside
Report given to holding LANA Taylor. Patient aware. Patient currently comfortable. VSS.
Wound to above L. eye is now actively bleeding, resident Amanda aware and states she will go assess patient. VSS.
Report received from LANA Jurado.

## 2018-07-23 NOTE — H&P ADULT - NSHPREVIEWOFSYSTEMS_GEN_ALL_CORE
REVIEW OF SYSTEMS:  GEN: no fever,    no chills  RESP: no SOB,   no cough  CVS: no chest pain,   no palpitations  GI: no abdominal pain,   no nausea,   no vomiting,   no constipation,   no diarrhea  : no dysuria,   no frequency  NEURO: no headache,   no dizziness  PSYCH: no depression,   not anxious  Derm : no rash

## 2018-07-23 NOTE — CONSULT NOTE ADULT - SUBJECTIVE AND OBJECTIVE BOX
Trauma Surgery Consult Note  Pager [phone]    HPI:  89-year-old female with CAD s/p cardiac stents (placed many years ago) on ASA/Plavix presented for an unwitnessed fall at home while ambulating to the bathroom. Patient does not remember the details surrounding the fall. Her health aide heard her fall, and brought her to the hospital. Trauma surgery called after a CTH revealed a small SAH.   On trauma evaluation, GCS was 15. Small area of bleeding was noted on the posterior parietal region.       PAST MEDICAL & SURGICAL HISTORY:  CAD s/p PCI    ALLERGIES:  NKA      HOME MEDICATIONS:  ASA 81mg  Plavix      SOCIAL HISTORY:  Lives with home health aide      FAMILY HISTORY:  No pertinent history in first degree relatives.  ___________________________________________  REVIEW OF SYSTEMS:  Constitutional: No fevers, chills, no recent weight loss  ENMT: No changes in hearing, no changes in vision, no sore throat, no cough  Respiratory: No shortness of breath  Cardiovascular: No chest pain, palpitations  Gastrointestinal: No abdominal pain, no diarrhea/constipation  Genitourinary: No dysuria, frequency, or urgency    Extremities: No joint swelling, no limited range of movement  Neurological: No paresthesia  Skin: No rashes  ___________________________________________  PHYSICAL EXAM:  Vital Signs Last 24 Hrs  T(C): 36.4 (22 Jul 2018 19:57), Max: 36.4 (22 Jul 2018 19:57)  T(F): 97.6 (22 Jul 2018 19:57), Max: 97.6 (22 Jul 2018 19:57)  HR: 70 (22 Jul 2018 21:05) (70 - 71)  BP: 149/78 (22 Jul 2018 21:05) (87/56 - 150/82)  BP(mean): --  RR: 16 (22 Jul 2018 19:57) (16 - 16)  SpO2: 100% (22 Jul 2018 19:57) (100% - 100%)CAPILLARY BLOOD GLUCOSE      General: NAD. Alert and oriented x 3, GCS 15  Neuro: Motor and sensory grosssly intact. Pupils equal and reactive bilaterally  HEENT: Posterior parietal scalp with an area of slow oozing - one staple placed. Anicteric sclera. Trachea midline. No crepitus  Chest/Respiratory: No chest wall ecchymosis or tenderness. Breath sounds bilaterally.  CV: Regular rate and rhythm.  Abdomen: Soft, nondistended, nontender.  Pelvis: Stable.  Extremities: Upper and lower extremities compartments soft. Full range of motion bilaterally.   Vascular: Palpable radial, DP/PT bilaterally.  ____________________________________________  LABS:  CBC Full  -  ( 22 Jul 2018 20:32 )  WBC Count : 11.0 K/uL  Hemoglobin : 14.4 g/dL  Hematocrit : 44.1 %  Platelet Count - Automated : 203 K/uL  Mean Cell Volume : 92.3 fl  Mean Cell Hemoglobin : 30.1 pg  Mean Cell Hemoglobin Concentration : 32.7 gm/dL  Auto Neutrophil # : x  Auto Lymphocyte # : x  Auto Monocyte # : x  Auto Eosinophil # : x  Auto Basophil # : x  Auto Neutrophil % : x  Auto Lymphocyte % : x  Auto Monocyte % : x  Auto Eosinophil % : x  Auto Basophil % : x    07-22    138  |  98  |  24<H>  ----------------------------<  212<H>  5.6<H>   |  27  |  0.76    Ca    10.1      22 Jul 2018 20:32    TPro  8.1  /  Alb  4.0  /  TBili  0.4  /  DBili  x   /  AST  25  /  ALT  33  /  AlkPhos  129<H>  07-22    LIVER FUNCTIONS - ( 22 Jul 2018 20:32 )  Alb: 4.0 g/dL / Pro: 8.1 g/dL / ALK PHOS: 129 U/L / ALT: 33 U/L / AST: 25 U/L / GGT: x           PT/INR - ( 22 Jul 2018 20:32 )   PT: 10.8 sec;   INR: 0.99 ratio         PTT - ( 22 Jul 2018 20:32 )  PTT:25.2 sec    ____________________________________________  RADIOLOGY:  CT Head No Cont (07.22.18 @ 19:42)   IMPRESSION:     Small volume post traumatic subarachnoid hemorrhages within the left   frontal sulci. Short-term follow-up head CT is recommended to assess for   stability.    Left parietal scalp hematoma without calvarial fracture.      CT Head No Cont (07.23.18 @ 00:11)   Stable trace posttraumatic subarachnoid hemorrhage along the left sylvian   fissure and inferior frontal sulcus. No new hemorrhage is seen.    There is atherosclerosis and volume loss. White matter hypodensities   noted compatible with mild to moderate microvascular ischemic change.   There is no midline shift, mass effect or ventriculomegaly.    The calvarium is intact. Fluid along theright maxillary sinus again   noted as well as complete opacification of the right sphenoid sinus.. The   mastoid air cells are clear. Left parietal scalp soft tissue swelling.    IMPRESSION:    Stable small volume posttraumatic subarachnoid hemorrhage        Xray Chest 1 View- PORTABLE-Urgent (07.22.18 @ 20:44)   INTERPRETATION:  Clear lungs. No acute displaced fracture.

## 2018-07-23 NOTE — ED PROVIDER NOTE - OBJECTIVE STATEMENT
89 yo F who presented s/p mechanical fall. Pt states she was going to the bathroom, slipped and hit her head on the floor. Denies LOC, chest pain or palpitations prior to the fall. No loss of bowel or bladder function. 89 yo F who presented s/p mechanical fall. Pt states she was going to the bathroom, slipped and hit the L side of her head on the floor. Denies LOC, chest pain or palpitations prior to the fall. No loss of bowel or bladder function. Pt is on xarelto. 89 yo F who presented s/p mechanical fall. Pt states she was going to the bathroom, slipped and hit the L side of her head on the floor. Denies LOC, chest pain or palpitations prior to the fall. No loss of bowel or bladder function. Pt is on plavix.

## 2018-07-23 NOTE — ED PROVIDER NOTE - MEDICAL DECISION MAKING DETAILS
89 yo F who presented s/p a mechanical fall. CT head shows a stable small volume posttraumatic subarachnoid hemorrhage. Pt is currently stable. Continue to monitor clinical exam for alterations in mental status. Pain control with Tylenol.

## 2018-07-23 NOTE — H&P ADULT - NSHPLABSRESULTS_GEN_ALL_CORE
LABS:                        11.6   12.3  )-----------( 216      ( 23 Jul 2018 14:13 )             34.6     07-23    137  |  98  |  27<H>  ----------------------------<  202<H>  5.4<H>   |  26  |  0.74    Ca    9.0      23 Jul 2018 14:13    TPro  6.8  /  Alb  3.5  /  TBili  0.5  /  DBili  x   /  AST  20  /  ALT  22  /  AlkPhos  100  07-23    PT/INR - ( 23 Jul 2018 14:13 )   PT: 11.8 sec;   INR: 1.09 ratio         PTT - ( 23 Jul 2018 14:13 )  PTT:26.3 sec            07-22 @ 22:24  4.7  15

## 2018-07-23 NOTE — ED ADULT NURSE REASSESSMENT NOTE - NS ED NURSE REASSESS COMMENT FT1
resting in bed, awaiting ambulette, neuro exam wnl, pvt aide present, vitals stable, no other c/o resting in bed, awaiting ambulette between 3 and 3:30 am, neuro exam wnl, pvt aide present, vitals stable, no other c/o

## 2018-07-24 LAB
ANION GAP SERPL CALC-SCNC: 14 MMOL/L — SIGNIFICANT CHANGE UP (ref 5–17)
BUN SERPL-MCNC: 25 MG/DL — HIGH (ref 7–23)
CALCIUM SERPL-MCNC: 8.8 MG/DL — SIGNIFICANT CHANGE UP (ref 8.4–10.5)
CHLORIDE SERPL-SCNC: 99 MMOL/L — SIGNIFICANT CHANGE UP (ref 96–108)
CK SERPL-CCNC: 61 U/L — SIGNIFICANT CHANGE UP (ref 25–170)
CO2 SERPL-SCNC: 28 MMOL/L — SIGNIFICANT CHANGE UP (ref 22–31)
CREAT SERPL-MCNC: 0.79 MG/DL — SIGNIFICANT CHANGE UP (ref 0.5–1.3)
GLUCOSE SERPL-MCNC: 156 MG/DL — HIGH (ref 70–99)
HCT VFR BLD CALC: 31.9 % — LOW (ref 34.5–45)
HGB BLD-MCNC: 10.6 G/DL — LOW (ref 11.5–15.5)
LACTATE SERPL-SCNC: 1.8 MMOL/L — SIGNIFICANT CHANGE UP (ref 0.7–2)
MCHC RBC-ENTMCNC: 31.2 PG — SIGNIFICANT CHANGE UP (ref 27–34)
MCHC RBC-ENTMCNC: 33.2 GM/DL — SIGNIFICANT CHANGE UP (ref 32–36)
MCV RBC AUTO: 93.8 FL — SIGNIFICANT CHANGE UP (ref 80–100)
PLATELET # BLD AUTO: 198 K/UL — SIGNIFICANT CHANGE UP (ref 150–400)
POTASSIUM SERPL-MCNC: 4.4 MMOL/L — SIGNIFICANT CHANGE UP (ref 3.5–5.3)
POTASSIUM SERPL-SCNC: 4.4 MMOL/L — SIGNIFICANT CHANGE UP (ref 3.5–5.3)
RBC # BLD: 3.4 M/UL — LOW (ref 3.8–5.2)
RBC # FLD: 14 % — SIGNIFICANT CHANGE UP (ref 10.3–14.5)
SODIUM SERPL-SCNC: 141 MMOL/L — SIGNIFICANT CHANGE UP (ref 135–145)
WBC # BLD: 9.93 K/UL — SIGNIFICANT CHANGE UP (ref 3.8–10.5)
WBC # FLD AUTO: 9.93 K/UL — SIGNIFICANT CHANGE UP (ref 3.8–10.5)

## 2018-07-24 PROCEDURE — 93010 ELECTROCARDIOGRAM REPORT: CPT

## 2018-07-24 RX ORDER — DIGOXIN 250 MCG
0.25 TABLET ORAL EVERY 8 HOURS
Refills: 0 | Status: DISCONTINUED | OUTPATIENT
Start: 2018-07-24 | End: 2018-07-24

## 2018-07-24 RX ORDER — DIGOXIN 250 MCG
0.25 TABLET ORAL DAILY
Refills: 0 | Status: DISCONTINUED | OUTPATIENT
Start: 2018-07-25 | End: 2018-07-25

## 2018-07-24 RX ORDER — DIGOXIN 250 MCG
0.5 TABLET ORAL ONCE
Refills: 0 | Status: COMPLETED | OUTPATIENT
Start: 2018-07-24 | End: 2018-07-24

## 2018-07-24 RX ADMIN — Medication 0.5 MILLIGRAM(S): at 11:05

## 2018-07-24 RX ADMIN — HEPARIN SODIUM 5000 UNIT(S): 5000 INJECTION INTRAVENOUS; SUBCUTANEOUS at 17:01

## 2018-07-24 RX ADMIN — LEVETIRACETAM 500 MILLIGRAM(S): 250 TABLET, FILM COATED ORAL at 17:02

## 2018-07-24 RX ADMIN — CEFTRIAXONE 100 GRAM(S): 500 INJECTION, POWDER, FOR SOLUTION INTRAMUSCULAR; INTRAVENOUS at 22:47

## 2018-07-24 RX ADMIN — LEVETIRACETAM 500 MILLIGRAM(S): 250 TABLET, FILM COATED ORAL at 05:44

## 2018-07-24 RX ADMIN — HEPARIN SODIUM 5000 UNIT(S): 5000 INJECTION INTRAVENOUS; SUBCUTANEOUS at 05:44

## 2018-07-24 RX ADMIN — ATORVASTATIN CALCIUM 10 MILLIGRAM(S): 80 TABLET, FILM COATED ORAL at 22:46

## 2018-07-24 NOTE — CONSULT NOTE ADULT - ASSESSMENT
Assessment :  Patient is a 89 female with history of CAD, cardiac stents on ASA and Plavix  ambulates with a walker at home . They report patient sustained a fall at home trauma to back of her head that required staple . After leaving the hospital and returning home patient sustained another fall with report of injury to her left brow area . Patient denies any other injuries. Her labs noted for anemia, no reports of BRBPR or melena. A remote hx of a colonoscopy in the past .Denies PUD . Family reports patient does not have a good appetite, though no significant weight loss reported     Plan:    Monitor labs and trends as ordered  Encourage po diet as tolerated   Check stool for OB   If continued decrease in Hgb and Hct , consider non contrast CT abdomen to r/o retroperitoneal bleed       Eugenia Pal, FARHAN-[facility]  [address] [place]  [address] [phone] Patient is a 89 female with history of CAD, cardiac stents on ASA and Plavix  ambulates with a walker at home . They report patient sustained a fall at home trauma to back of her head that required staple . After leaving the hospital and returning home patient sustained another fall with report of injury to her left brow area . Patient denies any other injuries. Her labs noted for anemia, no reports of BRBPR or melena. A remote hx of a colonoscopy in the past .Denies PUD . Family reports patient does not have a good appetite, though no significant weight loss reported     Rec  Monitor labs and trends as ordered  Encourage po diet as tolerated   Check stool for OB   If continued decrease in Hgb and Hct , consider non contrast CT abdomen to r/o retroperitoneal bleed       Eugenia Pal, FARHAN-[facility]  [address] [place]  [address] [phone]

## 2018-07-24 NOTE — CONSULT NOTE ADULT - SUBJECTIVE AND OBJECTIVE BOX
HPI  Patient is a 89 female with history of CAD, cardiac stents on ASA and Plavix  Patient was sitting up in bed, spouse and private aide bedside. Patient ambulates with a walker at home . They report patient sustained a fall at home trauma to back of her head that required staple . After leaving the hospital and returning home patient sustained another fall with report of injury to her left brow area . Patient denies any other injuries. Her labs noted for anemia, no reports of BRBPR or melena. A remote hx of a colonoscopy in the past .Denies PUD . Family reports patient does not have a good  appetite, though no significant weight loss reported        MEDICATIONS  (STANDING):  atorvastatin 10 milliGRAM(s) Oral at bedtime  cefTRIAXone   IVPB 1 Gram(s) IV Intermittent every 24 hours  digoxin     Tablet 0.25 milliGRAM(s) Oral every 8 hours  heparin  Injectable 5000 Unit(s) SubCutaneous every 12 hours  levETIRAcetam 500 milliGRAM(s) Oral two times a day    MEDICATIONS  (PRN):      Allergies    No Known Allergies    Intolerances        Review of Systems:    General:  No fevers or chills    HENT:  trauma to back of head, required sutures . No dysphagia  CV:  No chest pain  Resp:  No dyspnea, cough, tachypnea, or wheezing  GI, denies any nausea or vomiting, denies BRBPR or melena         Vital Signs Last 24 Hrs  T(C): 36.6 (24 Jul 2018 09:21), Max: 36.9 (23 Jul 2018 22:03)  T(F): 97.9 (24 Jul 2018 09:21), Max: 98.4 (23 Jul 2018 22:03)  HR: 92 (24 Jul 2018 11:05) (73 - 95)  BP: 95/66 (24 Jul 2018 09:21) (95/66 - 133/77)  BP(mean): --  RR: 18 (24 Jul 2018 09:21) (17 - 18)  SpO2: 92% (24 Jul 2018 09:21) (92% - 98%)    PHYSICAL EXAM:    Constitutional: elderly female non toxic in NAD  Neck:  supple  Respiratory: anterior chest wall clear to auscultation   Cardiovascular: S1 and S2, RRR  Gastrointestinal: soft non distended , hypoactive bowel sounds   Extremities: No peripheral edema, or  cyanosis  Psychiatric: Normal mood, normal affect  Skin: sutures dressing to left brow area       LABS:                        10.6   9.93  )-----------( 198      ( 24 Jul 2018 08:20 )             31.9     07-24    141  |  99  |  25<H>  ----------------------------<  156<H>  4.4   |  28  |  0.79    Ca    8.8      24 Jul 2018 06:28    TPro  6.8  /  Alb  3.5  /  TBili  0.5  /  DBili  x   /  AST  20  /  ALT  22  /  AlkPhos  100  07-23    PT/INR - ( 23 Jul 2018 14:13 )   PT: 11.8 sec;   INR: 1.09 ratio         PTT - ( 23 Jul 2018 14:13 )  PTT:26.3 sec    LIVER FUNCTIONS - ( 23 Jul 2018 14:13 )  Alb: 3.5 g/dL / Pro: 6.8 g/dL / ALK PHOS: 100 U/L / ALT: 22 U/L / AST: 20 U/L / GGT: x             RADIOLOGY & ADDITIONAL TESTS:  < from: CT Head No Cont (07.23.18 @ 13:06) >  EXAM:  CT CERVICAL SPINE                          EXAM:  CT BRAIN                            PROCEDURE DATE:  07/23/2018            INTERPRETATION:    Clinical Indication: Follow-up intracranial hemorrhage, recurring falls.    Brain CT:    5mm axial sections of the brain were obtained from base to vertex,   without the intravenous administration of contrast material.Coronal and   sagittal computer generated reconstructed views are available.    Comparison is made with the prior CT of 7/22/2018 and 7/23/2018 at 12:10   AM.    No significant change is identified since the prior exam.    The ventricles and sulci are prominent consistent age appropriate   involutional changes. Small vessel white matter ischemic changes are   noted.     A small area of high density just medial to the left sylvian fissure and   the left inferior frontal lobe is identified consistent with a small   amount of hemorrhage which is unchanged. An air-fluid level in the right   maxillary sinus and sphenoid sinuses are unchanged.       IMPRESSION: Atrophy. No change in small left frontal parenchymal   hemorrhage compared with 12:10 AM.      Cervical spine CT:    Serial thin sections on a multi slice scanner were obtained through the   cervical spine from the C1 to the T3 level in a stacked axial fashion   reformatted at 1.25 mm sections with sagittal and coronal computer   generated reconstructed views.    There is normal alignment of the vertebral bodies and facet joints. The   cervical vertebrae are normalheight and density. Vacuum disc phenomenon   is identified at C4-5 C5-6 and C6-7. Uncovertebral joint and facet   degenerative changes are noted. No acute fractures or dislocations are   seen.    IMPRESSION: Mild degenerative changes. No acute fractures or dislocations. Patient is a 89 female with history of CAD, cardiac stents on ASA and Plavix  Patient was sitting up in bed, spouse and private aide bedside. Patient ambulates with a walker at home . They report patient sustained a fall at home trauma to back of her head that required staple . After leaving the hospital and returning home patient sustained another fall with report of injury to her left brow area . Patient denies any other injuries. Her labs noted for anemia, no reports of BRBPR or melena. A remote hx of a colonoscopy in the past .Denies PUD . Family reports patient does not have a good  appetite, though no significant weight loss reported        MEDICATIONS  (STANDING):  atorvastatin 10 milliGRAM(s) Oral at bedtime  cefTRIAXone   IVPB 1 Gram(s) IV Intermittent every 24 hours  digoxin     Tablet 0.25 milliGRAM(s) Oral every 8 hours  heparin  Injectable 5000 Unit(s) SubCutaneous every 12 hours  levETIRAcetam 500 milliGRAM(s) Oral two times a day    MEDICATIONS  (PRN):    Allergies  No Known Allergies    Review of Systems:  General:  No fevers or chills    HENT:  trauma to back of head, required sutures . No dysphagia  CV:  No chest pain  Resp:  No dyspnea, cough, tachypnea, or wheezing  GI, denies any nausea or vomiting, denies BRBPR or melena     Vital Signs Last 24 Hrs  T(C): 36.6 (24 Jul 2018 09:21), Max: 36.9 (23 Jul 2018 22:03)  T(F): 97.9 (24 Jul 2018 09:21), Max: 98.4 (23 Jul 2018 22:03)  HR: 92 (24 Jul 2018 11:05) (73 - 95)  BP: 95/66 (24 Jul 2018 09:21) (95/66 - 133/77)  BP(mean): --  RR: 18 (24 Jul 2018 09:21) (17 - 18)  SpO2: 92% (24 Jul 2018 09:21) (92% - 98%)    PHYSICAL EXAM:  Constitutional: elderly female non toxic in NAD  Neck:  supple  Respiratory: anterior chest wall clear to auscultation   Cardiovascular: S1 and S2, RRR  Gastrointestinal: soft non distended , hypoactive bowel sounds   Extremities: No peripheral edema, or  cyanosis  Psychiatric: Normal mood, normal affect  Skin: sutures dressing to left brow area     LABS:                      10.6   9.93  )-----------( 198      ( 24 Jul 2018 08:20 )             31.9     07-24    141  |  99  |  25<H>  ----------------------------<  156<H>  4.4   |  28  |  0.79    Ca    8.8      24 Jul 2018 06:28    TPro  6.8  /  Alb  3.5  /  TBili  0.5  /  DBili  x   /  AST  20  /  ALT  22  /  AlkPhos  100  07-23    PT/INR - ( 23 Jul 2018 14:13 )   PT: 11.8 sec;   INR: 1.09 ratio    PTT - ( 23 Jul 2018 14:13 )  PTT:26.3 sec    RADIOLOGY & ADDITIONAL TESTS:  < from: CT Head No Cont (07.23.18 @ 13:06) >  EXAM:  CT CERVICAL SPINE                          EXAM:  CT BRAIN                            PROCEDURE DATE:  07/23/2018            INTERPRETATION:    Clinical Indication: Follow-up intracranial hemorrhage, recurring falls.    Brain CT:    5mm axial sections of the brain were obtained from base to vertex,   without the intravenous administration of contrast material.Coronal and   sagittal computer generated reconstructed views are available.    Comparison is made with the prior CT of 7/22/2018 and 7/23/2018 at 12:10   AM.    No significant change is identified since the prior exam.    The ventricles and sulci are prominent consistent age appropriate   involutional changes. Small vessel white matter ischemic changes are   noted.     A small area of high density just medial to the left sylvian fissure and   the left inferior frontal lobe is identified consistent with a small   amount of hemorrhage which is unchanged. An air-fluid level in the right   maxillary sinus and sphenoid sinuses are unchanged.       IMPRESSION: Atrophy. No change in small left frontal parenchymal   hemorrhage compared with 12:10 AM.      Cervical spine CT:    Serial thin sections on a multi slice scanner were obtained through the   cervical spine from the C1 to the T3 level in a stacked axial fashion   reformatted at 1.25 mm sections with sagittal and coronal computer   generated reconstructed views.    There is normal alignment of the vertebral bodies and facet joints. The   cervical vertebrae are normalheight and density. Vacuum disc phenomenon   is identified at C4-5 C5-6 and C6-7. Uncovertebral joint and facet   degenerative changes are noted. No acute fractures or dislocations are   seen.    IMPRESSION: Mild degenerative changes. No acute fractures or dislocations.

## 2018-07-24 NOTE — CONSULT NOTE ADULT - ASSESSMENT
89 yo with falling episodes without any constitutional symptoms  No clinical evidence of infection.  Her leukocytosis has moderated.  She denies any symptoms of a UTI and her fall and ICH may be explanation for leukocytosis.  Suggest:  1.Will stop antibiotics  2.Follow conservatively  3.Will only institute an infectious disease w/u if indicated

## 2018-07-24 NOTE — CHART NOTE - NSCHARTNOTEFT_GEN_A_CORE
Spoke with Dr. Unger, regarding digoxin for afib. As per Dr. Unger to dc digoxin 0.25mg q8hr x 2 doses and order digoxin 0.125mg daily starting tomorrow.    Diana Pedersen, JENNIFER  [phone]

## 2018-07-24 NOTE — CHART NOTE - NSCHARTNOTEFT_GEN_A_CORE
Notified by RN of patient Notified by RN of patient having runs of PAF on tele upto 170s. Vital signs reviewed. Spoke with Dr. Unger, plan to order Dig 0.5 x 1 dose then 0.25mg q8hr x 2 doses. Cardiology consult Dr. Sanford called, will follow up recs.     Diana Pedersen, JENNIFER  [phone]

## 2018-07-24 NOTE — CONSULT NOTE ADULT - SUBJECTIVE AND OBJECTIVE BOX
Patient is a 89y old  Female who presents with a chief complaint of syncope (23 Jul 2018 20:42)      HPI:     89y Female complaining of head injury.	  89 yo F who presented s/p mechanical fall/  syncope,   . Pt states she was going to the bathroom,  and   rhodes s not  know  how, she ,  and was  found  on floort the L side of her head on the floor. had another  fall yesterday, dc  from   er  yesterday  Denies , chest pain or palpitations prior to the fall.  No loss of bowel or bladder function. (23 Jul 2018 20:42)           *****PAST MEDICAL / Surgical  HISTORY:  PAST MEDICAL & SURGICAL HISTORY:           *****FAMILY HISTORY:  FAMILY HISTORY:           *****SOCIAL HISTORY:  Alcohol: None  Smoking: None         *****ALLERGIES:   Allergies    No Known Allergies    Intolerances             *****MEDICATIONS:  MEDICATIONS  (STANDING):  atorvastatin 10 milliGRAM(s) Oral at bedtime  cefTRIAXone   IVPB 1 Gram(s) IV Intermittent every 24 hours  digoxin     Tablet 0.25 milliGRAM(s) Oral every 8 hours  heparin  Injectable 5000 Unit(s) SubCutaneous every 12 hours  levETIRAcetam 500 milliGRAM(s) Oral two times a day    MEDICATIONS  (PRN):           *****REVIEW OF SYSTEM:  GEN: no fever, no chills, no pain  RESP: no SOB, no cough, no sputum  CVS: no chest pain, no palpitations, no edema  GI: no abdominal pain, no nausea, no vomiting, no constipation, no diarrhea  : no dysurea, no frequency, no hematurea  Neuro: no headache, no dizziness  PSYCH: no anxiety, no depression  Derm : no itching, no rash         *****VITAL SIGNS:  T(C): 36.6 (07-24-18 @ 09:21), Max: 36.9 (07-23-18 @ 22:03)  HR: 92 (07-24-18 @ 11:05) (73 - 95)  BP: 95/66 (07-24-18 @ 09:21) (95/66 - 133/77)  RR: 18 (07-24-18 @ 09:21) (17 - 18)  SpO2: 92% (07-24-18 @ 09:21) (92% - 98%)  Wt(kg): --    07-24 @ 07:01 - 07-24 @ 13:56  --------------------------------------------------------  IN: 482 mL / OUT: 0 mL / NET: 482 mL             *****PHYSICAL EXAM:  GEN: A&O X 3 , NAD , comfortable  HEENT: NCAT, PERRL, MMM, hearing intact  Neck: supple , no JVD  CVS: S1S2 , regular , 3/6SM  PULM: CTA B/L,  no W/R/R appreciated  ABD.: soft. non tender, non distended,  bowel sounds present  Extrem: intact pulses , no edema   Derm: No rash , no ecchymoses  PSYCH : normal mood,  no delusion not anxious         *****LAB AND IMAGING:                          10.6   9.93  )-----------( 198      ( 24 Jul 2018 08:20 )             31.9               07-24    141  |  99  |  25<H>  ----------------------------<  156<H>  4.4   |  28  |  0.79    Ca    8.8      24 Jul 2018 06:28    TPro  6.8  /  Alb  3.5  /  TBili  0.5  /  DBili  x   /  AST  20  /  ALT  22  /  AlkPhos  100  07-23    PT/INR - ( 23 Jul 2018 14:13 )   PT: 11.8 sec;   INR: 1.09 ratio         PTT - ( 23 Jul 2018 14:13 )  PTT:26.3 sec            CARDIAC MARKERS ( 24 Jul 2018 06:28 )  x     / x     / 61 U/L / x     / x                      [All pertinent recent Imaging reports reviewed]         *****A S S E S S M E N T   A N D   P L A N :  89F who presented s/p mechanical fall with SAH, prior hx CAD  tele NSR with brief runs of tachycardia, mostly appear PAT, cannot exclude PAF in some tracings  not an AC candidate due to SAH  started on dig given low BP  ideally would DC dig in favor of BB when BP allows  echo pending  check UA, UCx, BCx  IVF  monitor I/O  PT      ___________________________  Will follow with you.  Thank you,  WILLIAM Londono D.O.

## 2018-07-24 NOTE — PROVIDER CONTACT NOTE (OTHER) - SITUATION
PT noted with burst of PAFs on tele lasting for a few seconds.  The longest was 9 sec, up to 170s-190s.

## 2018-07-24 NOTE — CHART NOTE - NSCHARTNOTEFT_GEN_A_CORE
Received a call from Dr. Sanford in which he states he no longer rounds on patients at [facility]. Dr. Mccoy made aware. Dr. Vargas, cardiologist called for consult. Will follow up recs.     Diana Pedersen, JENNIFER  [phone]

## 2018-07-24 NOTE — PROGRESS NOTE ADULT - ASSESSMENT
pt with  htn,  cad /  stents, mote  than 3  years   ago pe r    admitted with  syncope/  found  to have hematoma/  bruise  on forehead  was  dc  from  er, yesterday after  a fall/  ct with  sah/ traumatic   tele,  was  seen by n/surgery, day prior  to admisssion   PT eval  mild elevated wbc, rocephin, follow  urine  c/s  s/p fall other  day . seen by n/surgery in  er yesterday   and  d/c  , now returns with  another fall,   keppra bid  stop  asa/ plavix, with h/o  SAH,  stents  was  placed  more than 3  yrs  ago  staples  on head  card called  dr fine, tele  with afib now, not a candidate  for a/c,  given traumatic head bleed  orthoststic pt with  htn,  cad /  stents, mote  than 3  years   ago pe r    admitted with  syncope/  found  to have hematoma/  bruise  on forehead  was  dc  from  er, yesterday after  a fall/  ct with  sah/ traumatic   tele,  was  seen by n/surgery, day prior  to admisssion   PT eval  mild elevated wbc, rocephin, follow  urine  c/s  s/p fall other  day . seen by n/surgery in  er yesterday   and  d/c  , now returns with  another fall,   keppra bid  stop  asa/ plavix, with h/o  SAH,  stents  was  placed  more than 3  yrs  ago  staples  on head  card called  dr fine, tele  with afib now, not a candidate  for a/c,  given traumatic head bleed  on  digoxin,/ rapid afib,   as   sbp is  on low  side  orthostatics   anemia, no  melena/  brbpr, gi called

## 2018-07-24 NOTE — CONSULT NOTE ADULT - SUBJECTIVE AND OBJECTIVE BOX
HPI:   Patient is a 89y female with a history of CAD,remote cardiac stents, maintained on Plavix and aspirin, who fell on 7/23, came to ER and noted to have a small SAH with facial laceration,sent home only to be readmitted after another fall at home.CTX started in ER for wbc of 12,000 which has returned to normal.No fever or chills,no recent infections.No cough, espiratory symptoms, Gi or  symptoms.She has had a neck CT without fracture, cardiology is evaluating for a run of A.Fib.    REVIEW OF SYSTEMS:  All other review of systems negative (Comprehensive ROS)    PAST MEDICAL & SURGICAL HISTORY:      Allergies    No Known Allergies    Intolerances        Antimicrobials Day #  :day 2  cefTRIAXone   IVPB 1 Gram(s) IV Intermittent every 24 hours    Other Medications:  atorvastatin 10 milliGRAM(s) Oral at bedtime  digoxin     Tablet 0.25 milliGRAM(s) Oral every 8 hours  heparin  Injectable 5000 Unit(s) SubCutaneous every 12 hours  levETIRAcetam 500 milliGRAM(s) Oral two times a day      FAMILY HISTORY:      SOCIAL HISTORY:  Smoking:  no   ETOH:no     Drug Use: no        T(F): 97.9 (07-24-18 @ 09:21), Max: 98.4 (07-23-18 @ 22:03)  HR: 92 (07-24-18 @ 11:05)  BP: 95/66 (07-24-18 @ 09:21)  RR: 18 (07-24-18 @ 09:21)  SpO2: 92% (07-24-18 @ 09:21)  Wt(kg): --    PHYSICAL EXAM:  General: alert, no acute distress  Eyes:  anicteric, no conjunctival injection, no discharge.Left periorbital ecchymosis  Oropharynx: no lesions or injection 	  Neck: supple, without adenopathy  Lungs: clear to auscultation  Heart: irregular rate and rhythm; no murmur, rubs or gallops  Abdomen: soft, nondistended, nontender, without mass or organomegaly  Skin: no lesions  Extremities: no clubbing, cyanosis, or edema  Neurologic: alert, oriented, moves all extremities  Left leg with a few bruises  LAB RESULTS:                        10.6   9.93  )-----------( 198      ( 24 Jul 2018 08:20 )             31.9     07-24    141  |  99  |  25<H>  ----------------------------<  156<H>  4.4   |  28  |  0.79    Ca    8.8      24 Jul 2018 06:28    TPro  6.8  /  Alb  3.5  /  TBili  0.5  /  DBili  x   /  AST  20  /  ALT  22  /  AlkPhos  100  07-23    LIVER FUNCTIONS - ( 23 Jul 2018 14:13 )  Alb: 3.5 g/dL / Pro: 6.8 g/dL / ALK PHOS: 100 U/L / ALT: 22 U/L / AST: 20 U/L / GGT: x               MICROBIOLOGY:  RECENT CULTURES:        RADIOLOGY REVIEWED:  CXR no infiltrates  < from: CT Head No Cont (07.23.18 @ 13:06) >    INTERPRETATION:    Clinical Indication: Follow-up intracranial hemorrhage, recurring falls.    Brain CT:    5mm axial sections of the brain were obtained from base to vertex,   without the intravenous administration of contrast material.Coronal and   sagittal computer generated reconstructed views are available.    Comparison is made with the prior CT of 7/22/2018 and 7/23/2018 at 12:10   AM.    No significant change is identified since the prior exam.    The ventricles and sulci are prominent consistent age appropriate   involutional changes. Small vessel white matter ischemic changes are   noted.     A small area of high density just medial to the left sylvian fissure and   the left inferior frontal lobe is identified consistent with a small   amount of hemorrhage which is unchanged. An air-fluid level in the right   maxillary sinus and sphenoid sinuses are unchanged.       IMPRESSION: Atrophy. No change in small left frontal parenchymal   hemorrhage compared with 12:10 AM.      Cervical spine CT:    Serial thin sections on a multi slice scanner were obtained through the   cervical spine from the C1 to the T3 level in a stacked axial fashion   reformatted at 1.25 mm sections with sagittal and coronal computer   generated reconstructed views.    There is normal alignment of the vertebral bodies and facet joints. The   cervical vertebrae are normalheight and density. Vacuum disc phenomenon   is identified at C4-5 C5-6 and C6-7. Uncovertebral joint and facet   degenerative changes are noted. No acute fractures or dislocations are   seen.    IMPRESSION: Mild degenerative changes. No acute fractures or dislocations.    < end of copied text >

## 2018-07-25 LAB
ANION GAP SERPL CALC-SCNC: 12 MMOL/L — SIGNIFICANT CHANGE UP (ref 5–17)
BUN SERPL-MCNC: 20 MG/DL — SIGNIFICANT CHANGE UP (ref 7–23)
CALCIUM SERPL-MCNC: 8.6 MG/DL — SIGNIFICANT CHANGE UP (ref 8.4–10.5)
CHLORIDE SERPL-SCNC: 101 MMOL/L — SIGNIFICANT CHANGE UP (ref 96–108)
CO2 SERPL-SCNC: 27 MMOL/L — SIGNIFICANT CHANGE UP (ref 22–31)
CREAT SERPL-MCNC: 0.66 MG/DL — SIGNIFICANT CHANGE UP (ref 0.5–1.3)
CULTURE RESULTS: SIGNIFICANT CHANGE UP
GLUCOSE BLDC GLUCOMTR-MCNC: 194 MG/DL — HIGH (ref 70–99)
GLUCOSE BLDC GLUCOMTR-MCNC: 246 MG/DL — HIGH (ref 70–99)
GLUCOSE BLDC GLUCOMTR-MCNC: 273 MG/DL — HIGH (ref 70–99)
GLUCOSE BLDC GLUCOMTR-MCNC: 273 MG/DL — HIGH (ref 70–99)
GLUCOSE SERPL-MCNC: 182 MG/DL — HIGH (ref 70–99)
HCT VFR BLD CALC: 29.1 % — LOW (ref 34.5–45)
HGB BLD-MCNC: 9.6 G/DL — LOW (ref 11.5–15.5)
MCHC RBC-ENTMCNC: 31 PG — SIGNIFICANT CHANGE UP (ref 27–34)
MCHC RBC-ENTMCNC: 33 GM/DL — SIGNIFICANT CHANGE UP (ref 32–36)
MCV RBC AUTO: 93.9 FL — SIGNIFICANT CHANGE UP (ref 80–100)
PLATELET # BLD AUTO: 209 K/UL — SIGNIFICANT CHANGE UP (ref 150–400)
POTASSIUM SERPL-MCNC: 4.3 MMOL/L — SIGNIFICANT CHANGE UP (ref 3.5–5.3)
POTASSIUM SERPL-SCNC: 4.3 MMOL/L — SIGNIFICANT CHANGE UP (ref 3.5–5.3)
RBC # BLD: 3.1 M/UL — LOW (ref 3.8–5.2)
RBC # FLD: 13.9 % — SIGNIFICANT CHANGE UP (ref 10.3–14.5)
SODIUM SERPL-SCNC: 140 MMOL/L — SIGNIFICANT CHANGE UP (ref 135–145)
SPECIMEN SOURCE: SIGNIFICANT CHANGE UP
WBC # BLD: 10.29 K/UL — SIGNIFICANT CHANGE UP (ref 3.8–10.5)
WBC # FLD AUTO: 10.29 K/UL — SIGNIFICANT CHANGE UP (ref 3.8–10.5)

## 2018-07-25 PROCEDURE — 93306 TTE W/DOPPLER COMPLETE: CPT | Mod: 26

## 2018-07-25 RX ORDER — OXYCODONE AND ACETAMINOPHEN 5; 325 MG/1; MG/1
1 TABLET ORAL EVERY 6 HOURS
Refills: 0 | Status: DISCONTINUED | OUTPATIENT
Start: 2018-07-25 | End: 2018-07-26

## 2018-07-25 RX ORDER — DEXTROSE 50 % IN WATER 50 %
15 SYRINGE (ML) INTRAVENOUS ONCE
Refills: 0 | Status: DISCONTINUED | OUTPATIENT
Start: 2018-07-25 | End: 2018-07-26

## 2018-07-25 RX ORDER — METOPROLOL TARTRATE 50 MG
25 TABLET ORAL DAILY
Refills: 0 | Status: DISCONTINUED | OUTPATIENT
Start: 2018-07-25 | End: 2018-07-26

## 2018-07-25 RX ORDER — DEXTROSE 50 % IN WATER 50 %
25 SYRINGE (ML) INTRAVENOUS ONCE
Refills: 0 | Status: DISCONTINUED | OUTPATIENT
Start: 2018-07-25 | End: 2018-07-26

## 2018-07-25 RX ORDER — ACETAMINOPHEN 500 MG
650 TABLET ORAL EVERY 6 HOURS
Refills: 0 | Status: DISCONTINUED | OUTPATIENT
Start: 2018-07-25 | End: 2018-07-26

## 2018-07-25 RX ORDER — TIMOLOL 0.5 %
1 DROPS OPHTHALMIC (EYE)
Refills: 0 | Status: DISCONTINUED | OUTPATIENT
Start: 2018-07-25 | End: 2018-07-26

## 2018-07-25 RX ORDER — GLUCAGON INJECTION, SOLUTION 0.5 MG/.1ML
1 INJECTION, SOLUTION SUBCUTANEOUS ONCE
Refills: 0 | Status: DISCONTINUED | OUTPATIENT
Start: 2018-07-25 | End: 2018-07-26

## 2018-07-25 RX ORDER — MIDODRINE HYDROCHLORIDE 2.5 MG/1
5 TABLET ORAL THREE TIMES A DAY
Refills: 0 | Status: DISCONTINUED | OUTPATIENT
Start: 2018-07-25 | End: 2018-07-26

## 2018-07-25 RX ORDER — DEXTROSE 50 % IN WATER 50 %
12.5 SYRINGE (ML) INTRAVENOUS ONCE
Refills: 0 | Status: DISCONTINUED | OUTPATIENT
Start: 2018-07-25 | End: 2018-07-26

## 2018-07-25 RX ORDER — INSULIN LISPRO 100/ML
VIAL (ML) SUBCUTANEOUS
Refills: 0 | Status: DISCONTINUED | OUTPATIENT
Start: 2018-07-25 | End: 2018-07-26

## 2018-07-25 RX ORDER — INSULIN LISPRO 100/ML
VIAL (ML) SUBCUTANEOUS AT BEDTIME
Refills: 0 | Status: DISCONTINUED | OUTPATIENT
Start: 2018-07-25 | End: 2018-07-26

## 2018-07-25 RX ORDER — SODIUM CHLORIDE 9 MG/ML
1000 INJECTION, SOLUTION INTRAVENOUS
Refills: 0 | Status: DISCONTINUED | OUTPATIENT
Start: 2018-07-25 | End: 2018-07-26

## 2018-07-25 RX ADMIN — HEPARIN SODIUM 5000 UNIT(S): 5000 INJECTION INTRAVENOUS; SUBCUTANEOUS at 05:14

## 2018-07-25 RX ADMIN — MIDODRINE HYDROCHLORIDE 5 MILLIGRAM(S): 2.5 TABLET ORAL at 13:35

## 2018-07-25 RX ADMIN — ATORVASTATIN CALCIUM 10 MILLIGRAM(S): 80 TABLET, FILM COATED ORAL at 21:43

## 2018-07-25 RX ADMIN — HEPARIN SODIUM 5000 UNIT(S): 5000 INJECTION INTRAVENOUS; SUBCUTANEOUS at 17:23

## 2018-07-25 RX ADMIN — Medication 1 DROP(S): at 17:22

## 2018-07-25 RX ADMIN — LEVETIRACETAM 500 MILLIGRAM(S): 250 TABLET, FILM COATED ORAL at 05:14

## 2018-07-25 RX ADMIN — Medication 3: at 17:23

## 2018-07-25 RX ADMIN — Medication 25 MILLIGRAM(S): at 13:35

## 2018-07-25 RX ADMIN — Medication 0.25 MILLIGRAM(S): at 05:14

## 2018-07-25 RX ADMIN — LEVETIRACETAM 500 MILLIGRAM(S): 250 TABLET, FILM COATED ORAL at 17:23

## 2018-07-25 RX ADMIN — MIDODRINE HYDROCHLORIDE 5 MILLIGRAM(S): 2.5 TABLET ORAL at 21:42

## 2018-07-25 NOTE — PHYSICAL THERAPY INITIAL EVALUATION ADULT - PERTINENT HX OF CURRENT PROBLEM, REHAB EVAL
Pt is a 89 yo F admitted to [facility] on 7/23/18 who presented s/p mechanical fall/ syncope. Pt states she was going to the bathroom, and  does not  know how she fell and was  found on the floor, hit the L side of her head on the floor. Pt had another  fall yesterday, dc  from  er  yesterday. Denies chest pain or palpitations prior to the fall. No loss of bowel or bladder function.

## 2018-07-25 NOTE — PHYSICAL THERAPY INITIAL EVALUATION ADULT - GENERAL OBSERVATIONS, REHAB EVAL
Pt is A&Ox4, follows commands, s/p fall at home, received supine, +ecchymosis to L eye, +dresing to eyebrow, +IVL. pt moving extremities in bed.

## 2018-07-25 NOTE — PHYSICAL THERAPY INITIAL EVALUATION ADULT - PRECAUTIONS/LIMITATIONS, REHAB EVAL
CTH: Mild degenerative changes. No acute fractures or dislocations.Stable small volume posttraumatic subarachnoid hemorrhage. CXR (-) fx, clear lungs. TTE: . Aortic valve not well visualized; appears calcified. Peak transaortic valve gradient equals 38 mm Hg, mean transaortic valve gradient equals 27 mm Hg, aortic valve velocity time integral equals 48 cm, consistent with moderate aortic stenosis. Septal hypertrophy. Hyperdynamic left ventricular systolic function. Mildleft ventricular outflow tract gradient: 9 mm Hg, without evidence of obstruction due to hyperdynamic LV function. Per cards, tele NSR with brief runs of tachycardia, mostly appear PAT, cannot exclude PAF in some tracings CTH: Mild degenerative changes. No acute fractures or dislocations.Stable small volume posttraumatic subarachnoid hemorrhage. CXR (-) fx, clear lungs. TTE: . Aortic valve not well visualized; appears calcified. Peak transaortic valve gradient equals 38 mm Hg, mean transaortic valve gradient equals 27 mm Hg, aortic valve velocity time integral equals 48 cm, consistent with moderate aortic stenosis. Septal hypertrophy. Hyperdynamic left ventricular systolic function. Mildleft ventricular outflow tract gradient: 9 mm Hg, without evidence of obstruction due to hyperdynamic LV function. Per cards, tele NSR with brief runs of tachycardia, mostly appear PAT, cannot exclude PAF in some tracings/fall precautions

## 2018-07-25 NOTE — PROGRESS NOTE ADULT - ASSESSMENT
pt with  htn,  cad /  stents, mote  than 3  years   ago pe r    admitted with  syncope/  found  to have hematoma/  bruise  on forehead  was  dc  from  er, yesterday after  a fall/  ct with  sah/ traumatic   tele,  was  seen by n/surgery, day prior  to admisssion   PT eval  mild elevated wbc, rocephin, follow  urine  c/s  s/p fall other  day . seen by n/surgery in  er yesterday   and  d/c  , now returns with  another fall,   keppra bid  stop  asa/ plavix, with h/o  SAH,  stents  was  placed  more than 3  yrs  ago  staples  on head  card called  dr fine, tele  with afib now, not a candidate  for a/c,  given traumatic head bleed    s/p  rapid afib,, now in  nsr, on toprol qd    orthostatic, on Midodrine  anemia, no  melena/  brbpr, gi called, follow  hb  echo, normal ef, mod  AS

## 2018-07-25 NOTE — PHYSICAL THERAPY INITIAL EVALUATION ADULT - DISCHARGE DISPOSITION, PT EVAL
DC subacute rehab for strengthening, balance training, endurance training, improve overall independence prior to DC home, PT spoke with dtr on phone at bedside, agreeable for [facility], requests [facility]rehabilitation facility

## 2018-07-26 ENCOUNTER — TRANSCRIPTION ENCOUNTER (OUTPATIENT)
Age: 83
End: 2018-07-26

## 2018-07-26 VITALS
DIASTOLIC BLOOD PRESSURE: 71 MMHG | HEART RATE: 80 BPM | RESPIRATION RATE: 18 BRPM | OXYGEN SATURATION: 99 % | TEMPERATURE: 98 F | SYSTOLIC BLOOD PRESSURE: 123 MMHG

## 2018-07-26 LAB
ANION GAP SERPL CALC-SCNC: 10 MMOL/L — SIGNIFICANT CHANGE UP (ref 5–17)
BUN SERPL-MCNC: 17 MG/DL — SIGNIFICANT CHANGE UP (ref 7–23)
CALCIUM SERPL-MCNC: 8.9 MG/DL — SIGNIFICANT CHANGE UP (ref 8.4–10.5)
CHLORIDE SERPL-SCNC: 100 MMOL/L — SIGNIFICANT CHANGE UP (ref 96–108)
CO2 SERPL-SCNC: 28 MMOL/L — SIGNIFICANT CHANGE UP (ref 22–31)
CREAT SERPL-MCNC: 0.69 MG/DL — SIGNIFICANT CHANGE UP (ref 0.5–1.3)
GLUCOSE BLDC GLUCOMTR-MCNC: 155 MG/DL — HIGH (ref 70–99)
GLUCOSE BLDC GLUCOMTR-MCNC: 177 MG/DL — HIGH (ref 70–99)
GLUCOSE SERPL-MCNC: 175 MG/DL — HIGH (ref 70–99)
HBA1C BLD-MCNC: 8.2 % — HIGH (ref 4–5.6)
HCT VFR BLD CALC: 29.6 % — LOW (ref 34.5–45)
HGB BLD-MCNC: 9.9 G/DL — LOW (ref 11.5–15.5)
MCHC RBC-ENTMCNC: 31.4 PG — SIGNIFICANT CHANGE UP (ref 27–34)
MCHC RBC-ENTMCNC: 33.4 GM/DL — SIGNIFICANT CHANGE UP (ref 32–36)
MCV RBC AUTO: 94 FL — SIGNIFICANT CHANGE UP (ref 80–100)
PLATELET # BLD AUTO: 237 K/UL — SIGNIFICANT CHANGE UP (ref 150–400)
POTASSIUM SERPL-MCNC: 4.3 MMOL/L — SIGNIFICANT CHANGE UP (ref 3.5–5.3)
POTASSIUM SERPL-SCNC: 4.3 MMOL/L — SIGNIFICANT CHANGE UP (ref 3.5–5.3)
RBC # BLD: 3.15 M/UL — LOW (ref 3.8–5.2)
RBC # FLD: 14 % — SIGNIFICANT CHANGE UP (ref 10.3–14.5)
SODIUM SERPL-SCNC: 138 MMOL/L — SIGNIFICANT CHANGE UP (ref 135–145)
WBC # BLD: 9.49 K/UL — SIGNIFICANT CHANGE UP (ref 3.8–10.5)
WBC # FLD AUTO: 9.49 K/UL — SIGNIFICANT CHANGE UP (ref 3.8–10.5)

## 2018-07-26 PROCEDURE — 82947 ASSAY GLUCOSE BLOOD QUANT: CPT

## 2018-07-26 PROCEDURE — 86850 RBC ANTIBODY SCREEN: CPT

## 2018-07-26 PROCEDURE — 85014 HEMATOCRIT: CPT

## 2018-07-26 PROCEDURE — 96374 THER/PROPH/DIAG INJ IV PUSH: CPT

## 2018-07-26 PROCEDURE — 83605 ASSAY OF LACTIC ACID: CPT

## 2018-07-26 PROCEDURE — 82550 ASSAY OF CK (CPK): CPT

## 2018-07-26 PROCEDURE — 80053 COMPREHEN METABOLIC PANEL: CPT

## 2018-07-26 PROCEDURE — 99285 EMERGENCY DEPT VISIT HI MDM: CPT | Mod: 25

## 2018-07-26 PROCEDURE — 70450 CT HEAD/BRAIN W/O DYE: CPT

## 2018-07-26 PROCEDURE — 99284 EMERGENCY DEPT VISIT MOD MDM: CPT | Mod: 25

## 2018-07-26 PROCEDURE — 82803 BLOOD GASES ANY COMBINATION: CPT

## 2018-07-26 PROCEDURE — 86900 BLOOD TYPING SEROLOGIC ABO: CPT

## 2018-07-26 PROCEDURE — 86901 BLOOD TYPING SEROLOGIC RH(D): CPT

## 2018-07-26 PROCEDURE — 84132 ASSAY OF SERUM POTASSIUM: CPT

## 2018-07-26 PROCEDURE — 90715 TDAP VACCINE 7 YRS/> IM: CPT

## 2018-07-26 PROCEDURE — 82330 ASSAY OF CALCIUM: CPT

## 2018-07-26 PROCEDURE — 82435 ASSAY OF BLOOD CHLORIDE: CPT

## 2018-07-26 PROCEDURE — 71045 X-RAY EXAM CHEST 1 VIEW: CPT

## 2018-07-26 PROCEDURE — 12011 RPR F/E/E/N/L/M 2.5 CM/<: CPT

## 2018-07-26 PROCEDURE — 97161 PT EVAL LOW COMPLEX 20 MIN: CPT

## 2018-07-26 PROCEDURE — 87086 URINE CULTURE/COLONY COUNT: CPT

## 2018-07-26 PROCEDURE — 80048 BASIC METABOLIC PNL TOTAL CA: CPT

## 2018-07-26 PROCEDURE — 83036 HEMOGLOBIN GLYCOSYLATED A1C: CPT

## 2018-07-26 PROCEDURE — 82962 GLUCOSE BLOOD TEST: CPT

## 2018-07-26 PROCEDURE — 72125 CT NECK SPINE W/O DYE: CPT

## 2018-07-26 PROCEDURE — 96375 TX/PRO/DX INJ NEW DRUG ADDON: CPT

## 2018-07-26 PROCEDURE — 85610 PROTHROMBIN TIME: CPT

## 2018-07-26 PROCEDURE — 84295 ASSAY OF SERUM SODIUM: CPT

## 2018-07-26 PROCEDURE — 85027 COMPLETE CBC AUTOMATED: CPT

## 2018-07-26 PROCEDURE — 93306 TTE W/DOPPLER COMPLETE: CPT

## 2018-07-26 PROCEDURE — 85730 THROMBOPLASTIN TIME PARTIAL: CPT

## 2018-07-26 PROCEDURE — 90471 IMMUNIZATION ADMIN: CPT

## 2018-07-26 PROCEDURE — 93005 ELECTROCARDIOGRAM TRACING: CPT

## 2018-07-26 RX ORDER — TIMOLOL 0.5 %
1 DROPS OPHTHALMIC (EYE)
Qty: 0 | Refills: 0 | DISCHARGE
Start: 2018-07-26

## 2018-07-26 RX ORDER — LEVETIRACETAM 250 MG/1
1 TABLET, FILM COATED ORAL
Qty: 0 | Refills: 0 | DISCHARGE
Start: 2018-07-26

## 2018-07-26 RX ORDER — MIDODRINE HYDROCHLORIDE 2.5 MG/1
1 TABLET ORAL
Qty: 0 | Refills: 0 | DISCHARGE
Start: 2018-07-26

## 2018-07-26 RX ORDER — ACETAMINOPHEN 500 MG
2 TABLET ORAL
Qty: 0 | Refills: 0 | DISCHARGE
Start: 2018-07-26

## 2018-07-26 RX ORDER — METOPROLOL TARTRATE 50 MG
1 TABLET ORAL
Qty: 0 | Refills: 0 | DISCHARGE
Start: 2018-07-26

## 2018-07-26 RX ADMIN — Medication 25 MILLIGRAM(S): at 05:21

## 2018-07-26 RX ADMIN — HEPARIN SODIUM 5000 UNIT(S): 5000 INJECTION INTRAVENOUS; SUBCUTANEOUS at 05:21

## 2018-07-26 RX ADMIN — Medication 1: at 11:45

## 2018-07-26 RX ADMIN — Medication 1: at 08:05

## 2018-07-26 RX ADMIN — MIDODRINE HYDROCHLORIDE 5 MILLIGRAM(S): 2.5 TABLET ORAL at 05:21

## 2018-07-26 RX ADMIN — Medication 1 DROP(S): at 05:22

## 2018-07-26 RX ADMIN — MIDODRINE HYDROCHLORIDE 5 MILLIGRAM(S): 2.5 TABLET ORAL at 14:10

## 2018-07-26 RX ADMIN — LEVETIRACETAM 500 MILLIGRAM(S): 250 TABLET, FILM COATED ORAL at 05:21

## 2018-07-26 NOTE — DISCHARGE NOTE ADULT - CARE PLAN
Principal Discharge DX:	Fall, initial encounter  Goal:	Remain without falls  Assessment and plan of treatment:	HOME CARE INSTRUCTIONS  Have someone stay with you until you feel stable.  Do not drive, operate machinery, or play sports until your caregiver says it is okay.  Keep all follow-up appointments as directed by your caregiver.   Lie down right away if you start feeling like you might faint. Breathe deeply and steadily. Wait until all the symptoms have passed.Drink enough fluids to keep your urine clear or pale yellow.  If you are taking blood pressure or heart medicine, get up slowly, taking several minutes to sit and then stand. This can reduce dizziness.  SEEK IMMEDIATE MEDICAL CARE IF:  You have a severe headache.  You have unusual pain in the chest, abdomen, or back.  You are bleeding from the mouth or rectum, or you have black or tarry stool.  You have an irregular or very fast heartbeat.  You have pain with breathing.  You have repeated fainting or seizure-like jerking during an episode.  You faint when sitting or lying down.  You have confusion.  You have difficulty walking.  You have severe weakness.  You have vision problems.  If you fainted, call your local emergency services. Do not drive yourself to the hospital  Secondary Diagnosis:	Syncope  Assessment and plan of treatment:	HOME CARE INSTRUCTIONS  Have someone stay with you until you feel stable.  Do not drive, operate machinery, or play sports until your caregiver says it is okay.  Keep all follow-up appointments as directed by your caregiver.   Lie down right away if you start feeling like you might faint. Breathe deeply and steadily. Wait until all the symptoms have passed.Drink enough fluids to keep your urine clear or pale yellow.  If you are taking blood pressure or heart medicine, get up slowly, taking several minutes to sit and then stand. This can reduce dizziness.  SEEK IMMEDIATE MEDICAL CARE IF:  You have a severe headache.  You have unusual pain in the chest, abdomen, or back.  You are bleeding from the mouth or rectum, or you have black or tarry stool.  You have an irregular or very fast heartbeat.  You have pain with breathing.  You have repeated fainting or seizure-like jerking during an episode.  You faint when sitting or lying down.  You have confusion.  You have difficulty walking.  You have severe weakness.  You have vision problems.  If you fainted, call your local emergency services. Do not drive yourself to the hospital

## 2018-07-26 NOTE — DISCHARGE NOTE ADULT - PLAN OF CARE
Remain without falls HOME CARE INSTRUCTIONS  Have someone stay with you until you feel stable.  Do not drive, operate machinery, or play sports until your caregiver says it is okay.  Keep all follow-up appointments as directed by your caregiver.   Lie down right away if you start feeling like you might faint. Breathe deeply and steadily. Wait until all the symptoms have passed.Drink enough fluids to keep your urine clear or pale yellow.  If you are taking blood pressure or heart medicine, get up slowly, taking several minutes to sit and then stand. This can reduce dizziness.  SEEK IMMEDIATE MEDICAL CARE IF:  You have a severe headache.  You have unusual pain in the chest, abdomen, or back.  You are bleeding from the mouth or rectum, or you have black or tarry stool.  You have an irregular or very fast heartbeat.  You have pain with breathing.  You have repeated fainting or seizure-like jerking during an episode.  You faint when sitting or lying down.  You have confusion.  You have difficulty walking.  You have severe weakness.  You have vision problems.  If you fainted, call your local emergency services. Do not drive yourself to the hospital

## 2018-07-26 NOTE — PROGRESS NOTE ADULT - SUBJECTIVE AND OBJECTIVE BOX
- Patient seen and examined.  - In summary, patient is a 89 year old woman who presented with  syncope (2018 20:42)  - Today, patient is without complaints.         *****MEDICATIONS:    MEDICATIONS  (STANDING):  atorvastatin 10 milliGRAM(s) Oral at bedtime  dextrose 5%. 1000 milliLiter(s) (50 mL/Hr) IV Continuous <Continuous>  dextrose 50% Injectable 12.5 Gram(s) IV Push once  dextrose 50% Injectable 25 Gram(s) IV Push once  dextrose 50% Injectable 25 Gram(s) IV Push once  heparin  Injectable 5000 Unit(s) SubCutaneous every 12 hours  insulin lispro (HumaLOG) corrective regimen sliding scale   SubCutaneous three times a day before meals  insulin lispro (HumaLOG) corrective regimen sliding scale   SubCutaneous at bedtime  levETIRAcetam 500 milliGRAM(s) Oral two times a day  metoprolol succinate ER 25 milliGRAM(s) Oral daily  midodrine 5 milliGRAM(s) Oral three times a day  timolol 0.5% Solution 1 Drop(s) Both EYES two times a day    MEDICATIONS  (PRN):  acetaminophen   Tablet. 650 milliGRAM(s) Oral every 6 hours PRN Mild Pain (1 - 3)  dextrose 40% Gel 15 Gram(s) Oral once PRN Blood Glucose LESS THAN 70 milliGRAM(s)/deciliter  glucagon  Injectable 1 milliGRAM(s) IntraMuscular once PRN Glucose LESS THAN 70 milligrams/deciliter  oxyCODONE    5 mG/acetaminophen 325 mG 1 Tablet(s) Oral every 6 hours PRN Moderate Pain (4 - 6)             ***** REVIEW OF SYSTEM:  GEN: no fever, no chills, no pain  RESP: no SOB, no cough, no sputum  CVS: no chest pain, no palpitations, no edema  GI: no abdominal pain, no nausea, no vomiting, no constipation, no diarrhea  : no dysuria, no frequency  NEURO: no headache, no dizziness  PSYCH: no depression, not anxious  Derm : no itching, no rash         ***** VITAL SIGNS:    T(F): 97.8 (18 @ 04:44), Max: 98.6 (18 @ 20:26)  HR: 61 (18 @ 04:44) (61 - 109)  BP: 127/76 (18 @ 04:44) (109/71 - 127/76)  RR: 18 (18 @ 04:44) (18 - 18)  SpO2: 97% (18 @ 04:44) (96% - 97%)  Wt(kg): --  ,   I&O's Summary    2018 07:01  -  2018 07:00  --------------------------------------------------------  IN: 300 mL / OUT: 400 mL / NET: -100 mL                   *****PHYSICAL EXAM:  GEN: A&O X 3 , NAD , comfortable  HEENT: NCAT, EOMI, MMM, no icterus  NECK: Supple, No JVD  CVS: S1S2 , regular , No M/R/G appreciated  PULM: CTA B/L,  no W/R/R appreciated  ABD.: soft. non tender, non distended,  bowel sounds present  Extrem: intact pulses , no edema noted  Derm: No rash or ecchymosis noted  PSYCH: normal mood, no depression, not anxious         *****LAB AND IMAGIN.9    9.49  )-----------( 237      ( 2018 08:01 )             29.6                   138  |  100  |  17  ----------------------------<  175<H>  4.3   |  28  |  0.69    Ca    8.9      2018 06:14              [All pertinent recent Imaging/Reports reviewed]  < from: Transthoracic Echocardiogram (18 @ 08:41) >  Conclusions:  1. Aortic valve not well visualized; appears calcified.  Peak transaortic valve gradient equals 38 mm Hg, mean  transaortic valve gradient equals 27 mm Hg, aortic valve  velocity time integral equals 48 cm, consistent with  moderate aortic stenosis.  2. Septal hypertrophy.  3. Hyperdynamic left ventricular systolic function. Mild  left ventricular outflow tract gradient: 9 mm Hg, without  evidence of obstruction due to hyperdynamic LV function.    < end of copied text >         *****A S S E S S M E N T   A N D   P L A N :  89F who presented s/p mechanical fall with SAH, prior hx CAD  tele NSR with brief runs of tachycardia, mostly appear PAT,   cannot exclude PAF in some tracings from   maintained sinus for past few days  not an AC candidate due to SAH  hold off further dig  cont BB   echo noted  abx for UTI  monitor I/O  PT  DCP      __________________________  A. MIKAELA Londono.
- Patient seen and examined.  - In summary, patient is a 89 year old woman who presented with  syncope (2018 20:42)  - Today, patient is without complaints.         *****MEDICATIONS:    MEDICATIONS  (STANDING):  atorvastatin 10 milliGRAM(s) Oral at bedtime  cefTRIAXone   IVPB 1 Gram(s) IV Intermittent every 24 hours  heparin  Injectable 5000 Unit(s) SubCutaneous every 12 hours  levETIRAcetam 500 milliGRAM(s) Oral two times a day  metoprolol succinate ER 25 milliGRAM(s) Oral daily  midodrine 5 milliGRAM(s) Oral three times a day    MEDICATIONS  (PRN):           ***** REVIEW OF SYSTEM:  GEN: no fever, no chills, no pain  RESP: no SOB, no cough, no sputum  CVS: no chest pain, no palpitations, no edema  GI: no abdominal pain, no nausea, no vomiting, no constipation, no diarrhea  : no dysuria, no frequency  NEURO: no headache, no dizziness  PSYCH: no depression, not anxious  Derm : no itching, no rash         ***** VITAL SIGNS:  T(F): 97.8 (18 @ 04:37), Max: 98 (18 @ 20:33)  HR: 109 (18 @ 11:51) (91 - 109)  BP: 109/71 (18 @ 11:51) (109/71 - 123/72)  RR: 18 (18 @ 04:37) (18 - 18)  SpO2: 97% (18 @ 11:51) (94% - 97%)  Wt(kg): --  ,   I&O's Summary    2018 07:01  -  2018 07:00  --------------------------------------------------------  IN: 782 mL / OUT: 200 mL / NET: 582 mL             *****PHYSICAL EXAM:  GEN: A&O X 3 , NAD , comfortable  HEENT: NCAT, EOMI, MMM, no icterus  NECK: Supple, No JVD  CVS: S1S2 , regular , No M/R/G appreciated  PULM: CTA B/L,  no W/R/R appreciated  ABD.: soft. non tender, non distended,  bowel sounds present  Extrem: intact pulses , no edema noted  Derm: No rash or ecchymosis noted  PSYCH: normal mood, no depression, not anxious         *****LAB AND IMAGIN.6    10.29 )-----------( 209      ( 2018 07:59 )             29.1                   140  |  101  |  20  ----------------------------<  182<H>  4.3   |  27  |  0.66    Ca    8.6      2018 07:00    TPro  6.8  /  Alb  3.5  /  TBili  0.5  /  DBili  x   /  AST  20  /  ALT  22  /  AlkPhos  100      PT/INR - ( 2018 14:13 )   PT: 11.8 sec;   INR: 1.09 ratio         PTT - ( 2018 14:13 )  PTT:26.3 sec       CARDIAC MARKERS ( 2018 06:28 )  x     / x     / 61 U/L / x     / x                    [All pertinent recent Imaging/Reports reviewed]  < from: Transthoracic Echocardiogram (18 @ 08:41) >  Conclusions:  1. Aortic valve not well visualized; appears calcified.  Peak transaortic valve gradient equals 38 mm Hg, mean  transaortic valve gradient equals 27 mm Hg, aortic valve  velocity time integral equals 48 cm, consistent with  moderate aortic stenosis.  2. Septal hypertrophy.  3. Hyperdynamic left ventricular systolic function. Mild  left ventricular outflow tract gradient: 9 mm Hg, without  evidence of obstruction due to hyperdynamic LV function.    < end of copied text >         *****A S S E S S M E N T   A N D   P L A N :  89F who presented s/p mechanical fall with SAH, prior hx CAD  tele NSR with brief runs of tachycardia, mostly appear PAT,   cannot exclude PAF in some tracings from , past 24hr no AF  not an AC candidate due to SAH  hold off further dig  cont BB   echo noted  abx for UTI  monitor I/O  PT        __________________________  A. Bry, D.O.
CC: f/u for leukocytosis    Patient reports comfortable, no pain, no SOB    REVIEW OF SYSTEMS:  All other review of systems negative (Comprehensive ROS)    Antimicrobials off    Medications Reviewed    Vital Signs Last 24 Hrs  T(F): 98.2 (26 Jul 2018 11:25), Max: 98.6 (25 Jul 2018 20:26)  HR: 80 (26 Jul 2018 11:25) (61 - 80)  BP: 123/71 (26 Jul 2018 11:25) (110/70 - 127/76)  BP(mean): 83 (25 Jul 2018 20:26) (83 - 83)  RR: 18 (26 Jul 2018 11:25) (18 - 18)  SpO2: 99% (26 Jul 2018 11:25) (96% - 99%)    PHYSICAL EXAM:  General: alert, no acute distress  Eyes:  anicteric, L periorbital ecchymosis  Oropharynx: no lesions or injection 	  Neck: supple, without adenopathy  Lungs: clear to auscultation  Heart: irregular rhythm; no murmur, rubs or gallops  Abdomen: soft, nondistended, nontender, without mass or organomegaly  Skin: no rash  Extremities: no clubbing, cyanosis, or edema  Neurologic: alert, moves all extremities    LAB RESULTS:                        9.9    9.49  )-----------( 237      ( 26 Jul 2018 08:01 )             29.6     07-26    138  |  100  |  17  ----------------------------<  175<H>  4.3   |  28  |  0.69    Ca    8.9      26 Jul 2018 06:14    MICROBIOLOGY:  RECENT CULTURES:  07-24 @ 03:07 .Urine Clean Catch (Midstream)     50,000 - 99,000 CFU/mL Coag Negative Staphylococcus  Normal Urogenital rosalio present    RADIOLOGY REVIEWED:  Xray Chest 1 View- PORTABLE-Urgent (07.22.18 @ 20:44) >  Clear lungs.
INTERVAL HPI/OVERNIGHT EVENTS:  Patient sitting up in bed, reports she is OK , could not recall if or what she ate earlier or if she had a bowel movement . Has No GI complaints     MEDICATIONS  (STANDING):  atorvastatin 10 milliGRAM(s) Oral at bedtime  dextrose 5%. 1000 milliLiter(s) (50 mL/Hr) IV Continuous <Continuous>  dextrose 50% Injectable 12.5 Gram(s) IV Push once  dextrose 50% Injectable 25 Gram(s) IV Push once  dextrose 50% Injectable 25 Gram(s) IV Push once  heparin  Injectable 5000 Unit(s) SubCutaneous every 12 hours  insulin lispro (HumaLOG) corrective regimen sliding scale   SubCutaneous three times a day before meals  insulin lispro (HumaLOG) corrective regimen sliding scale   SubCutaneous at bedtime  levETIRAcetam 500 milliGRAM(s) Oral two times a day  metoprolol succinate ER 25 milliGRAM(s) Oral daily  midodrine 5 milliGRAM(s) Oral three times a day  timolol 0.5% Solution 1 Drop(s) Both EYES two times a day    MEDICATIONS  (PRN):  acetaminophen   Tablet. 650 milliGRAM(s) Oral every 6 hours PRN Mild Pain (1 - 3)  dextrose 40% Gel 15 Gram(s) Oral once PRN Blood Glucose LESS THAN 70 milliGRAM(s)/deciliter  glucagon  Injectable 1 milliGRAM(s) IntraMuscular once PRN Glucose LESS THAN 70 milligrams/deciliter  oxyCODONE    5 mG/acetaminophen 325 mG 1 Tablet(s) Oral every 6 hours PRN Moderate Pain (4 - 6)      Allergies    No Known Allergies    Intolerances        Review of Systems:    General:  No fevers otr chills    HENT:  denies head ache,  sore throat, or dysphagia  CV:  No chest  pain  Resp:  No dyspnea, cough, tachypnea, wheezing  GI: denies nausea or vomiting, no abdominal pain     Vital Signs Last 24 Hrs  T(C): 36.8 (26 Jul 2018 11:25), Max: 37 (25 Jul 2018 20:26)  T(F): 98.2 (26 Jul 2018 11:25), Max: 98.6 (25 Jul 2018 20:26)  HR: 80 (26 Jul 2018 11:25) (61 - 80)  BP: 123/71 (26 Jul 2018 11:25) (110/70 - 127/76)  BP(mean): 83 (25 Jul 2018 20:26) (83 - 83)  RR: 18 (26 Jul 2018 11:25) (18 - 18)  SpO2: 99% (26 Jul 2018 11:25) (96% - 99%)    PHYSICAL EXAM:    Constitutional: thin elderly female, non toxic and in NAD, well-developed  HENT: suture/ dressing over left brow, posterior scalp abrasion area dry      Neck:  supple  Respiratory: anterior chest wall clear to auscultation , no  wheezing  Cardiovascular: S1 and S2, RRR  Gastrointestinal: soft non tender with hypoactive +Bowel sounds   Extremities: No peripheral edema, or cyanosis   Vascular: 2+ peripheral pulses  Psychiatric: Normal mood, normal affect  Skin: No jaundice  rashes      LABS:                        9.9    9.49  )-----------( 237      ( 26 Jul 2018 08:01 )             29.6     07-26    138  |  100  |  17  ----------------------------<  175<H>  4.3   |  28  |  0.69    Ca    8.9      26 Jul 2018 06:14          LIVER FUNCTIONS - ( 23 Jul 2018 14:13 )  Alb: 3.5 g/dL / Pro: 6.8 g/dL / ALK PHOS: 100 U/L / ALT: 22 U/L / AST: 20 U/L / GGT: x             RADIOLOGY & ADDITIONAL TESTS:
no  complaints   tele,  nsr  with pat  orthostatic    REVIEW OF SYSTEMS:  GEN: no fever,    no chills  RESP: no SOB,   no cough  CVS: no chest pain,   no palpitations  GI: no abdominal pain,   no nausea,   no vomiting,   no constipation,   no diarrhea  : no dysuria,   no frequency  NEURO: no headache,   no dizziness  PSYCH: no depression,   not anxious  Derm : no rash    MEDICATIONS  (STANDING):  atorvastatin 10 milliGRAM(s) Oral at bedtime  cefTRIAXone   IVPB 1 Gram(s) IV Intermittent every 24 hours  heparin  Injectable 5000 Unit(s) SubCutaneous every 12 hours  levETIRAcetam 500 milliGRAM(s) Oral two times a day  metoprolol succinate ER 25 milliGRAM(s) Oral daily  midodrine 5 milliGRAM(s) Oral three times a day    MEDICATIONS  (PRN):      Vital Signs Last 24 Hrs  T(C): 36.6 (25 Jul 2018 04:37), Max: 36.7 (24 Jul 2018 20:33)  T(F): 97.8 (25 Jul 2018 04:37), Max: 98 (24 Jul 2018 20:33)  HR: 91 (25 Jul 2018 04:37) (91 - 105)  BP: 123/72 (25 Jul 2018 04:37) (115/72 - 123/72)  BP(mean): --  RR: 18 (25 Jul 2018 04:37) (18 - 18)  SpO2: 97% (25 Jul 2018 04:37) (94% - 97%)  CAPILLARY BLOOD GLUCOSE        I&O's Summary    24 Jul 2018 07:01  -  25 Jul 2018 07:00  --------------------------------------------------------  IN: 782 mL / OUT: 200 mL / NET: 582 mL        PHYSICAL EXAM:  HEAD:  Atraumatic, Normocephalic  NECK: Supple, No   JVD  CHEST/LUNG:   no     rales,     no,    rhonchi  HEART: Regular rate and rhythm;         murmur  ABDOMEN: Soft, Nontender, ;   EXTREMITIES:        edema  NEUROLOGY:  alert    LABS:                        9.6    10.29 )-----------( 209      ( 25 Jul 2018 07:59 )             29.1     07-25    140  |  101  |  20  ----------------------------<  182<H>  4.3   |  27  |  0.66    Ca    8.6      25 Jul 2018 07:00    TPro  6.8  /  Alb  3.5  /  TBili  0.5  /  DBili  x   /  AST  20  /  ALT  22  /  AlkPhos  100  07-23    PT/INR - ( 23 Jul 2018 14:13 )   PT: 11.8 sec;   INR: 1.09 ratio         PTT - ( 23 Jul 2018 14:13 )  PTT:26.3 sec  CARDIAC MARKERS ( 24 Jul 2018 06:28 )  x     / x     / 61 U/L / x     / x            Lactate, Blood: 1.8 mmol/L (07-24 @ 06:28)                  Consultant(s) Notes Reviewed:      Care Discussed with Consultants/Other Providers:
no  cp/sob  REVIEW OF SYSTEMS:  GEN: no fever,    no chills  RESP: no SOB,   no cough  CVS: no chest pain,   no palpitations  GI: no abdominal pain,   no nausea,   no vomiting,   no constipation,   no diarrhea  : no dysuria,   no frequency  NEURO: no headache,   no dizziness  PSYCH: no depression,   not anxious  Derm : no rash    MEDICATIONS  (STANDING):  atorvastatin 10 milliGRAM(s) Oral at bedtime  dextrose 5%. 1000 milliLiter(s) (50 mL/Hr) IV Continuous <Continuous>  dextrose 50% Injectable 12.5 Gram(s) IV Push once  dextrose 50% Injectable 25 Gram(s) IV Push once  dextrose 50% Injectable 25 Gram(s) IV Push once  heparin  Injectable 5000 Unit(s) SubCutaneous every 12 hours  insulin lispro (HumaLOG) corrective regimen sliding scale   SubCutaneous three times a day before meals  insulin lispro (HumaLOG) corrective regimen sliding scale   SubCutaneous at bedtime  levETIRAcetam 500 milliGRAM(s) Oral two times a day  metoprolol succinate ER 25 milliGRAM(s) Oral daily  midodrine 5 milliGRAM(s) Oral three times a day  timolol 0.5% Solution 1 Drop(s) Both EYES two times a day    MEDICATIONS  (PRN):  acetaminophen   Tablet. 650 milliGRAM(s) Oral every 6 hours PRN Mild Pain (1 - 3)  dextrose 40% Gel 15 Gram(s) Oral once PRN Blood Glucose LESS THAN 70 milliGRAM(s)/deciliter  glucagon  Injectable 1 milliGRAM(s) IntraMuscular once PRN Glucose LESS THAN 70 milligrams/deciliter  oxyCODONE    5 mG/acetaminophen 325 mG 1 Tablet(s) Oral every 6 hours PRN Moderate Pain (4 - 6)      Vital Signs Last 24 Hrs  T(C): 36.6 (26 Jul 2018 04:44), Max: 37 (25 Jul 2018 20:26)  T(F): 97.8 (26 Jul 2018 04:44), Max: 98.6 (25 Jul 2018 20:26)  HR: 61 (26 Jul 2018 04:44) (61 - 109)  BP: 127/76 (26 Jul 2018 04:44) (109/71 - 127/76)  BP(mean): 83 (25 Jul 2018 20:26) (83 - 83)  RR: 18 (26 Jul 2018 04:44) (18 - 18)  SpO2: 97% (26 Jul 2018 04:44) (96% - 97%)  CAPILLARY BLOOD GLUCOSE      POCT Blood Glucose.: 194 mg/dL (25 Jul 2018 21:48)  POCT Blood Glucose.: 273 mg/dL (25 Jul 2018 16:35)  POCT Blood Glucose.: 273 mg/dL (25 Jul 2018 16:34)  POCT Blood Glucose.: 246 mg/dL (25 Jul 2018 12:38)    I&O's Summary    24 Jul 2018 07:01  -  25 Jul 2018 07:00  --------------------------------------------------------  IN: 782 mL / OUT: 200 mL / NET: 582 mL    25 Jul 2018 07:01  -  26 Jul 2018 06:14  --------------------------------------------------------  IN: 300 mL / OUT: 300 mL / NET: 0 mL        PHYSICAL EXAM:  HEAD:  Atraumatic, Normocephalic  NECK: Supple, No   JVD  CHEST/LUNG:   no     rales,     no,    rhonchi  HEART: Regular rate and rhythm;         murmur  ABDOMEN: Soft, Nontender, ;   EXTREMITIES:        edema  NEUROLOGY:  alert    LABS:                        9.6    10.29 )-----------( 209      ( 25 Jul 2018 07:59 )             29.1     07-25    140  |  101  |  20  ----------------------------<  182<H>  4.3   |  27  |  0.66    Ca    8.6      25 Jul 2018 07:00        CARDIAC MARKERS ( 24 Jul 2018 06:28 )  x     / x     / 61 U/L / x     / x            Lactate, Blood: 1.8 mmol/L (07-24 @ 06:28)                  Consultant(s) Notes Reviewed:      Care Discussed with Consultants/Other Providers:
tele, pat.  and   9  sec  run of paf  pt  is weak  REVIEW OF SYSTEMS:  GEN: no fever,    no chills  RESP: no SOB,   no cough  CVS: no chest pain,   no palpitations  GI: no abdominal pain,   no nausea,   no vomiting,   no constipation,   no diarrhea  : no dysuria,   no frequency  NEURO: no headache,   no dizziness  PSYCH: no depression,   not anxious  Derm : no rash    MEDICATIONS  (STANDING):  atorvastatin 10 milliGRAM(s) Oral at bedtime  cefTRIAXone   IVPB 1 Gram(s) IV Intermittent every 24 hours  digoxin     Tablet 0.25 milliGRAM(s) Oral every 8 hours  heparin  Injectable 5000 Unit(s) SubCutaneous every 12 hours  levETIRAcetam 500 milliGRAM(s) Oral two times a day    MEDICATIONS  (PRN):      Vital Signs Last 24 Hrs  T(C): 36.6 (24 Jul 2018 09:21), Max: 36.9 (23 Jul 2018 22:03)  T(F): 97.9 (24 Jul 2018 09:21), Max: 98.4 (23 Jul 2018 22:03)  HR: 92 (24 Jul 2018 11:05) (68 - 95)  BP: 95/66 (24 Jul 2018 09:21) (95/66 - 133/77)  BP(mean): --  RR: 18 (24 Jul 2018 09:21) (17 - 18)  SpO2: 92% (24 Jul 2018 09:21) (92% - 98%)  CAPILLARY BLOOD GLUCOSE        I&O's Summary    24 Jul 2018 07:01  -  24 Jul 2018 11:29  --------------------------------------------------------  IN: 237 mL / OUT: 0 mL / NET: 237 mL        PHYSICAL EXAM:  HEAD:  Atraumatic, Normocephalic  NECK: Supple, No   JVD  CHEST/LUNG:   no     rales,     no,    rhonchi  HEART: Regular rate and rhythm;         murmur  ABDOMEN: Soft, Nontender, ;   EXTREMITIES:        edema  NEUROLOGY:  alert    LABS:                        10.6   9.93  )-----------( 198      ( 24 Jul 2018 08:20 )             31.9     07-24    141  |  99  |  25<H>  ----------------------------<  156<H>  4.4   |  28  |  0.79    Ca    8.8      24 Jul 2018 06:28    TPro  6.8  /  Alb  3.5  /  TBili  0.5  /  DBili  x   /  AST  20  /  ALT  22  /  AlkPhos  100  07-23    PT/INR - ( 23 Jul 2018 14:13 )   PT: 11.8 sec;   INR: 1.09 ratio         PTT - ( 23 Jul 2018 14:13 )  PTT:26.3 sec  CARDIAC MARKERS ( 24 Jul 2018 06:28 )  x     / x     / 61 U/L / x     / x            Lactate, Blood: 1.8 mmol/L (07-24 @ 06:28)      07-22 @ 22:24  4.7  15              Consultant(s) Notes Reviewed:      Care Discussed with Consultants/Other Providers:

## 2018-07-26 NOTE — DISCHARGE NOTE ADULT - MEDICATION SUMMARY - MEDICATIONS TO TAKE
I will START or STAY ON the medications listed below when I get home from the hospital:    Percocet 5/325  -- 1 tab(s) by mouth 4 times a day, As Needed  -- Indication: For pain    acetaminophen 325 mg oral tablet  -- 2 tab(s) by mouth every 6 hours, As needed, Mild Pain (1 - 3)  -- Indication: For pain    levETIRAcetam 500 mg oral tablet  -- 1 tab(s) by mouth 2 times a day  -- Indication: For Fall    glipiZIDE 5 mg oral tablet  -- 1 tab(s) by mouth 2 times a day  -- Indication: For diabetes    Lipitor 10 mg oral tablet  -- 1 tab(s) by mouth once a day  -- Indication: For HLD    metoprolol succinate 25 mg oral tablet, extended release  -- 1 tab(s) by mouth once a day  -- Indication: For htn    midodrine 5 mg oral tablet  -- 1 tab(s) by mouth 3 times a day  -- Indication: For dizziness    timolol maleate 0.5% ophthalmic solution  -- 1 drop(s) to each affected eye 2 times a day  -- Indication: For eye drops

## 2018-07-26 NOTE — DISCHARGE NOTE ADULT - CARE PROVIDER_API CALL
Lucio Londono (DO), Cardiology; Interventional Cardiology  [address] [place] [address]  Phone: [phone]  Fax: [phone]

## 2018-07-26 NOTE — DISCHARGE NOTE ADULT - PATIENT PORTAL LINK FT
You can access the [facility] Patient Portal, offered by Health system, by registering with the following website: [email or web address]

## 2018-07-26 NOTE — PROGRESS NOTE ADULT - ASSESSMENT
Assessment:   Patient is a 89 female with history of CAD, cardiac stents on ASA and Plavix  Report patient sustained a fall at home trauma to back of her head that required staple . After leaving the hospital and returning home patient sustained another fall with report of injury to her left brow area .    Her labs noted for anemia, no reports of BRBPR or melena. A remote hx of a colonoscopy in the past by  . Spoke to Dr Akhil Anthony her primary GI to inquire when last endoscopic procedures were , her last EGD was Nov 2016 normal exam .Per Dr Anthony her Hgb was 14.7 in June 27, 2018  Her Hgb and Hct stable over past 2 days with no reports of active GI blood loss.       Plan:  Monitor labs and trends as ordered   awaiting stool for OB   Encourage po diet.  No plan for EGD or colonoscopy as no sign of active GI blood loss     FARHAN [facility]  [address] [place]  [address] [phone]

## 2018-07-26 NOTE — DISCHARGE NOTE ADULT - MEDICATION SUMMARY - MEDICATIONS TO STOP TAKING
I will STOP taking the medications listed below when I get home from the hospital:    Lexapro 10 mg oral tablet  -- 1 tab(s) by mouth once a day    Aspir 81 oral delayed release tablet  -- 1 tab(s) by mouth once a day    Plavix 75 mg oral tablet  -- 1 tab(s) by mouth once a day    Crestor  -- 5 milligram(s) by mouth once a day (at bedtime)    Namenda XR 28 mg oral capsule, extended release  -- 1 cap(s) by mouth once a day

## 2018-07-26 NOTE — DISCHARGE NOTE ADULT - HOSPITAL COURSE
pt with  htn,  cad /  stents, mote  than 3  years   ago pe r    admitted with  syncope/  found  to have hematoma/  bruise  on forehead  was  dc  from  [facility], yesterday after  a fall/  ct with  sah/ traumatic   tele,  was  seen by n/surgery, day prior  to admisssion  mild elevated wbc, rocephin,  completed  s/p fall other  day . seen by n/surgery in  er yesterday   and  d/c  , now returns with  another fall,   keppra bid  stop  asa/ plavix, with h/o  SAH,  stents  was  placed  more than 3  yrs  ago  staples  on head  card called  dr fine, tele  with afib now, not a candidate  for a/c,  given traumatic head bleed    s/p  rapid afib,, now in  nsr, on toprol qd    orthostatic, on Midodrine  anemia, no  melena/  brbpr, gi called, echo, normal ef, mod  AS  Hgb stable, proceed with d/c  planning  to rehab per  PT

## 2018-07-26 NOTE — PROGRESS NOTE ADULT - ASSESSMENT
pt with  htn,  cad /  stents, mote  than 3  years   ago pe r    admitted with  syncope/  found  to have hematoma/  bruise  on forehead  was  dc  from  er, yesterday after  a fall/  ct with  sah/ traumatic   tele,  was  seen by n/surgery, day prior  to admisssion  mild elevated wbc, rocephin,  completed  s/p fall other  day . seen by n/surgery in  er yesterday   and  d/c  , now returns with  another fall,   keppra bid  stop  asa/ plavix, with h/o  SAH,  stents  was  placed  more than 3  yrs  ago  staples  on head  card called  dr fine, tele  with afib now, not a candidate  for a/c,  given traumatic head bleed    s/p  rapid afib,, now in  nsr, on toprol qd    orthostatic, on Midodrine  anemia, no  melena/  brbpr, gi called, follow  hb, hb pending today  echo, normal ef, mod  AS  if hb stable, then proceedw ith d/c  planning  to rehab per  PT

## 2018-07-26 NOTE — PROGRESS NOTE ADULT - ASSESSMENT
89 yo with falling episodes and no constitutional symptoms  Afebrile  Leukocytosis promptly resolved  CoNS in urine is a contaminant  No sign of infection  Re consult with us if status changes

## 2018-10-16 ENCOUNTER — INPATIENT (INPATIENT)
Facility: HOSPITAL | Age: 83
LOS: 8 days | Discharge: HOME HEALTH | End: 2018-10-25
Attending: INTERNAL MEDICINE | Admitting: INTERNAL MEDICINE
Payer: MEDICARE

## 2018-10-16 VITALS
DIASTOLIC BLOOD PRESSURE: 71 MMHG | HEART RATE: 62 BPM | TEMPERATURE: 98 F | SYSTOLIC BLOOD PRESSURE: 136 MMHG | OXYGEN SATURATION: 100 % | RESPIRATION RATE: 18 BRPM

## 2018-10-16 DIAGNOSIS — I16.0 HYPERTENSIVE URGENCY: ICD-10-CM

## 2018-10-16 DIAGNOSIS — R55 SYNCOPE AND COLLAPSE: ICD-10-CM

## 2018-10-16 DIAGNOSIS — E78.5 HYPERLIPIDEMIA, UNSPECIFIED: ICD-10-CM

## 2018-10-16 DIAGNOSIS — Z95.5 PRESENCE OF CORONARY ANGIOPLASTY IMPLANT AND GRAFT: Chronic | ICD-10-CM

## 2018-10-16 DIAGNOSIS — I25.10 ATHEROSCLEROTIC HEART DISEASE OF NATIVE CORONARY ARTERY WITHOUT ANGINA PECTORIS: ICD-10-CM

## 2018-10-16 DIAGNOSIS — E87.1 HYPO-OSMOLALITY AND HYPONATREMIA: ICD-10-CM

## 2018-10-16 DIAGNOSIS — F03.90 UNSPECIFIED DEMENTIA WITHOUT BEHAVIORAL DISTURBANCE: ICD-10-CM

## 2018-10-16 DIAGNOSIS — Z29.9 ENCOUNTER FOR PROPHYLACTIC MEASURES, UNSPECIFIED: ICD-10-CM

## 2018-10-16 DIAGNOSIS — E11.9 TYPE 2 DIABETES MELLITUS WITHOUT COMPLICATIONS: ICD-10-CM

## 2018-10-16 PROBLEM — I10 ESSENTIAL (PRIMARY) HYPERTENSION: Chronic | Status: ACTIVE | Noted: 2018-02-04

## 2018-10-16 LAB
ALBUMIN SERPL ELPH-MCNC: 3.6 G/DL — SIGNIFICANT CHANGE UP (ref 3.3–5)
ALP SERPL-CCNC: 91 U/L — SIGNIFICANT CHANGE UP (ref 40–120)
ALT FLD-CCNC: 16 U/L — SIGNIFICANT CHANGE UP (ref 4–33)
APPEARANCE UR: SIGNIFICANT CHANGE UP
AST SERPL-CCNC: 18 U/L — SIGNIFICANT CHANGE UP (ref 4–32)
B PERT DNA SPEC QL NAA+PROBE: SIGNIFICANT CHANGE UP
BACTERIA # UR AUTO: NEGATIVE — SIGNIFICANT CHANGE UP
BASE EXCESS BLDV CALC-SCNC: 5.4 MMOL/L — SIGNIFICANT CHANGE UP
BASOPHILS # BLD AUTO: 0.03 K/UL — SIGNIFICANT CHANGE UP (ref 0–0.2)
BASOPHILS NFR BLD AUTO: 0.3 % — SIGNIFICANT CHANGE UP (ref 0–2)
BILIRUB SERPL-MCNC: 0.5 MG/DL — SIGNIFICANT CHANGE UP (ref 0.2–1.2)
BILIRUB UR-MCNC: NEGATIVE — SIGNIFICANT CHANGE UP
BLOOD GAS VENOUS - CREATININE: 0.71 MG/DL — SIGNIFICANT CHANGE UP (ref 0.5–1.3)
BLOOD UR QL VISUAL: NEGATIVE — SIGNIFICANT CHANGE UP
BUN SERPL-MCNC: 20 MG/DL — SIGNIFICANT CHANGE UP (ref 7–23)
C PNEUM DNA SPEC QL NAA+PROBE: NOT DETECTED — SIGNIFICANT CHANGE UP
CALCIUM SERPL-MCNC: 9 MG/DL — SIGNIFICANT CHANGE UP (ref 8.4–10.5)
CHLORIDE BLDV-SCNC: 97 MMOL/L — SIGNIFICANT CHANGE UP (ref 96–108)
CHLORIDE SERPL-SCNC: 90 MMOL/L — LOW (ref 98–107)
CO2 SERPL-SCNC: 22 MMOL/L — SIGNIFICANT CHANGE UP (ref 22–31)
COLOR SPEC: YELLOW — SIGNIFICANT CHANGE UP
CREAT SERPL-MCNC: 0.73 MG/DL — SIGNIFICANT CHANGE UP (ref 0.5–1.3)
EOSINOPHIL # BLD AUTO: 0.1 K/UL — SIGNIFICANT CHANGE UP (ref 0–0.5)
EOSINOPHIL NFR BLD AUTO: 1 % — SIGNIFICANT CHANGE UP (ref 0–6)
FLUAV H1 2009 PAND RNA SPEC QL NAA+PROBE: NOT DETECTED — SIGNIFICANT CHANGE UP
FLUAV H1 RNA SPEC QL NAA+PROBE: NOT DETECTED — SIGNIFICANT CHANGE UP
FLUAV H3 RNA SPEC QL NAA+PROBE: NOT DETECTED — SIGNIFICANT CHANGE UP
FLUAV SUBTYP SPEC NAA+PROBE: SIGNIFICANT CHANGE UP
FLUBV RNA SPEC QL NAA+PROBE: NOT DETECTED — SIGNIFICANT CHANGE UP
GAS PNL BLDV: 126 MMOL/L — LOW (ref 136–146)
GLUCOSE BLDV-MCNC: 195 — HIGH (ref 70–99)
GLUCOSE SERPL-MCNC: 194 MG/DL — HIGH (ref 70–99)
GLUCOSE UR-MCNC: 150 — HIGH
HADV DNA SPEC QL NAA+PROBE: NOT DETECTED — SIGNIFICANT CHANGE UP
HCO3 BLDV-SCNC: 27 MMOL/L — SIGNIFICANT CHANGE UP (ref 20–27)
HCOV 229E RNA SPEC QL NAA+PROBE: NOT DETECTED — SIGNIFICANT CHANGE UP
HCOV HKU1 RNA SPEC QL NAA+PROBE: NOT DETECTED — SIGNIFICANT CHANGE UP
HCOV NL63 RNA SPEC QL NAA+PROBE: NOT DETECTED — SIGNIFICANT CHANGE UP
HCOV OC43 RNA SPEC QL NAA+PROBE: NOT DETECTED — SIGNIFICANT CHANGE UP
HCT VFR BLD CALC: 41.7 % — SIGNIFICANT CHANGE UP (ref 34.5–45)
HCT VFR BLDV CALC: 44.3 % — SIGNIFICANT CHANGE UP (ref 34.5–45)
HGB BLD-MCNC: 13.8 G/DL — SIGNIFICANT CHANGE UP (ref 11.5–15.5)
HGB BLDV-MCNC: 14.4 G/DL — SIGNIFICANT CHANGE UP (ref 11.5–15.5)
HMPV RNA SPEC QL NAA+PROBE: NOT DETECTED — SIGNIFICANT CHANGE UP
HPIV1 RNA SPEC QL NAA+PROBE: NOT DETECTED — SIGNIFICANT CHANGE UP
HPIV2 RNA SPEC QL NAA+PROBE: NOT DETECTED — SIGNIFICANT CHANGE UP
HPIV3 RNA SPEC QL NAA+PROBE: NOT DETECTED — SIGNIFICANT CHANGE UP
HPIV4 RNA SPEC QL NAA+PROBE: NOT DETECTED — SIGNIFICANT CHANGE UP
HYALINE CASTS # UR AUTO: NEGATIVE — SIGNIFICANT CHANGE UP
IMM GRANULOCYTES # BLD AUTO: 0.04 # — SIGNIFICANT CHANGE UP
IMM GRANULOCYTES NFR BLD AUTO: 0.4 % — SIGNIFICANT CHANGE UP (ref 0–1.5)
KETONES UR-MCNC: SIGNIFICANT CHANGE UP
LACTATE BLDV-MCNC: 1.8 MMOL/L — SIGNIFICANT CHANGE UP (ref 0.5–2)
LEUKOCYTE ESTERASE UR-ACNC: SIGNIFICANT CHANGE UP
LYMPHOCYTES # BLD AUTO: 1.61 K/UL — SIGNIFICANT CHANGE UP (ref 1–3.3)
LYMPHOCYTES # BLD AUTO: 15.9 % — SIGNIFICANT CHANGE UP (ref 13–44)
M PNEUMO DNA SPEC QL NAA+PROBE: NOT DETECTED — SIGNIFICANT CHANGE UP
MCHC RBC-ENTMCNC: 28.6 PG — SIGNIFICANT CHANGE UP (ref 27–34)
MCHC RBC-ENTMCNC: 33.1 % — SIGNIFICANT CHANGE UP (ref 32–36)
MCV RBC AUTO: 86.5 FL — SIGNIFICANT CHANGE UP (ref 80–100)
MONOCYTES # BLD AUTO: 0.64 K/UL — SIGNIFICANT CHANGE UP (ref 0–0.9)
MONOCYTES NFR BLD AUTO: 6.3 % — SIGNIFICANT CHANGE UP (ref 2–14)
NEUTROPHILS # BLD AUTO: 7.71 K/UL — HIGH (ref 1.8–7.4)
NEUTROPHILS NFR BLD AUTO: 76.1 % — SIGNIFICANT CHANGE UP (ref 43–77)
NITRITE UR-MCNC: NEGATIVE — SIGNIFICANT CHANGE UP
NRBC # FLD: 0 — SIGNIFICANT CHANGE UP
PCO2 BLDV: 45 MMHG — SIGNIFICANT CHANGE UP (ref 41–51)
PH BLDV: 7.44 PH — HIGH (ref 7.32–7.43)
PH UR: 6 — SIGNIFICANT CHANGE UP (ref 5–8)
PLATELET # BLD AUTO: 233 K/UL — SIGNIFICANT CHANGE UP (ref 150–400)
PMV BLD: 10.8 FL — SIGNIFICANT CHANGE UP (ref 7–13)
PO2 BLDV: < 24 MMHG — LOW (ref 35–40)
POTASSIUM BLDV-SCNC: 4.3 MMOL/L — SIGNIFICANT CHANGE UP (ref 3.4–4.5)
POTASSIUM SERPL-MCNC: 4.6 MMOL/L — SIGNIFICANT CHANGE UP (ref 3.5–5.3)
POTASSIUM SERPL-SCNC: 4.6 MMOL/L — SIGNIFICANT CHANGE UP (ref 3.5–5.3)
PROT SERPL-MCNC: 6.8 G/DL — SIGNIFICANT CHANGE UP (ref 6–8.3)
PROT UR-MCNC: 10 — SIGNIFICANT CHANGE UP
RBC # BLD: 4.82 M/UL — SIGNIFICANT CHANGE UP (ref 3.8–5.2)
RBC # FLD: 14.6 % — HIGH (ref 10.3–14.5)
RBC CASTS # UR COMP ASSIST: HIGH (ref 0–?)
RSV RNA SPEC QL NAA+PROBE: NOT DETECTED — SIGNIFICANT CHANGE UP
RV+EV RNA SPEC QL NAA+PROBE: NOT DETECTED — SIGNIFICANT CHANGE UP
SAO2 % BLDV: 23.9 % — LOW (ref 60–85)
SODIUM SERPL-SCNC: 130 MMOL/L — LOW (ref 135–145)
SP GR SPEC: 1.02 — SIGNIFICANT CHANGE UP (ref 1–1.04)
SQUAMOUS # UR AUTO: SIGNIFICANT CHANGE UP
TROPONIN T, HIGH SENSITIVITY: 9 NG/L — SIGNIFICANT CHANGE UP (ref ?–14)
TROPONIN T, HIGH SENSITIVITY: 9 NG/L — SIGNIFICANT CHANGE UP (ref ?–14)
TSH SERPL-MCNC: 3.15 UIU/ML — SIGNIFICANT CHANGE UP (ref 0.27–4.2)
UROBILINOGEN FLD QL: NORMAL — SIGNIFICANT CHANGE UP
WBC # BLD: 10.13 K/UL — SIGNIFICANT CHANGE UP (ref 3.8–10.5)
WBC # FLD AUTO: 10.13 K/UL — SIGNIFICANT CHANGE UP (ref 3.8–10.5)
WBC UR QL: HIGH (ref 0–?)

## 2018-10-16 PROCEDURE — 70450 CT HEAD/BRAIN W/O DYE: CPT | Mod: 26

## 2018-10-16 PROCEDURE — 72170 X-RAY EXAM OF PELVIS: CPT | Mod: 26

## 2018-10-16 PROCEDURE — 71045 X-RAY EXAM CHEST 1 VIEW: CPT | Mod: 26

## 2018-10-16 RX ORDER — DEXTROSE 50 % IN WATER 50 %
25 SYRINGE (ML) INTRAVENOUS ONCE
Qty: 0 | Refills: 0 | Status: DISCONTINUED | OUTPATIENT
Start: 2018-10-16 | End: 2018-10-25

## 2018-10-16 RX ORDER — METOPROLOL TARTRATE 50 MG
50 TABLET ORAL DAILY
Qty: 0 | Refills: 0 | Status: DISCONTINUED | OUTPATIENT
Start: 2018-10-16 | End: 2018-10-17

## 2018-10-16 RX ORDER — HYDRALAZINE HCL 50 MG
25 TABLET ORAL ONCE
Qty: 0 | Refills: 0 | Status: COMPLETED | OUTPATIENT
Start: 2018-10-16 | End: 2018-10-16

## 2018-10-16 RX ORDER — TIMOLOL 0.5 %
1 DROPS OPHTHALMIC (EYE) EVERY 12 HOURS
Qty: 0 | Refills: 0 | Status: DISCONTINUED | OUTPATIENT
Start: 2018-10-16 | End: 2018-10-25

## 2018-10-16 RX ORDER — GLUCAGON INJECTION, SOLUTION 0.5 MG/.1ML
1 INJECTION, SOLUTION SUBCUTANEOUS ONCE
Qty: 0 | Refills: 0 | Status: DISCONTINUED | OUTPATIENT
Start: 2018-10-16 | End: 2018-10-25

## 2018-10-16 RX ORDER — ACETAMINOPHEN 500 MG
650 TABLET ORAL EVERY 6 HOURS
Qty: 0 | Refills: 0 | Status: DISCONTINUED | OUTPATIENT
Start: 2018-10-16 | End: 2018-10-25

## 2018-10-16 RX ORDER — SODIUM CHLORIDE 9 MG/ML
1000 INJECTION INTRAMUSCULAR; INTRAVENOUS; SUBCUTANEOUS
Qty: 0 | Refills: 0 | Status: DISCONTINUED | OUTPATIENT
Start: 2018-10-16 | End: 2018-10-17

## 2018-10-16 RX ORDER — INSULIN LISPRO 100/ML
VIAL (ML) SUBCUTANEOUS AT BEDTIME
Qty: 0 | Refills: 0 | Status: DISCONTINUED | OUTPATIENT
Start: 2018-10-16 | End: 2018-10-25

## 2018-10-16 RX ORDER — LISINOPRIL 2.5 MG/1
5 TABLET ORAL DAILY
Qty: 0 | Refills: 0 | Status: DISCONTINUED | OUTPATIENT
Start: 2018-10-16 | End: 2018-10-17

## 2018-10-16 RX ORDER — SODIUM CHLORIDE 9 MG/ML
1000 INJECTION, SOLUTION INTRAVENOUS
Qty: 0 | Refills: 0 | Status: DISCONTINUED | OUTPATIENT
Start: 2018-10-16 | End: 2018-10-25

## 2018-10-16 RX ORDER — INSULIN LISPRO 100/ML
VIAL (ML) SUBCUTANEOUS
Qty: 0 | Refills: 0 | Status: DISCONTINUED | OUTPATIENT
Start: 2018-10-16 | End: 2018-10-25

## 2018-10-16 RX ORDER — SODIUM CHLORIDE 9 MG/ML
500 INJECTION INTRAMUSCULAR; INTRAVENOUS; SUBCUTANEOUS ONCE
Qty: 0 | Refills: 0 | Status: COMPLETED | OUTPATIENT
Start: 2018-10-16 | End: 2018-10-16

## 2018-10-16 RX ORDER — CLOPIDOGREL BISULFATE 75 MG/1
75 TABLET, FILM COATED ORAL DAILY
Qty: 0 | Refills: 0 | Status: DISCONTINUED | OUTPATIENT
Start: 2018-10-16 | End: 2018-10-25

## 2018-10-16 RX ORDER — ENOXAPARIN SODIUM 100 MG/ML
40 INJECTION SUBCUTANEOUS EVERY 24 HOURS
Qty: 0 | Refills: 0 | Status: DISCONTINUED | OUTPATIENT
Start: 2018-10-16 | End: 2018-10-25

## 2018-10-16 RX ORDER — ATORVASTATIN CALCIUM 80 MG/1
10 TABLET, FILM COATED ORAL AT BEDTIME
Qty: 0 | Refills: 0 | Status: DISCONTINUED | OUTPATIENT
Start: 2018-10-16 | End: 2018-10-25

## 2018-10-16 RX ORDER — INFLUENZA VIRUS VACCINE 15; 15; 15; 15 UG/.5ML; UG/.5ML; UG/.5ML; UG/.5ML
0.5 SUSPENSION INTRAMUSCULAR ONCE
Qty: 0 | Refills: 0 | Status: DISCONTINUED | OUTPATIENT
Start: 2018-10-16 | End: 2018-10-20

## 2018-10-16 RX ORDER — MEMANTINE HYDROCHLORIDE 10 MG/1
10 TABLET ORAL
Qty: 0 | Refills: 0 | Status: DISCONTINUED | OUTPATIENT
Start: 2018-10-16 | End: 2018-10-25

## 2018-10-16 RX ORDER — ASPIRIN/CALCIUM CARB/MAGNESIUM 324 MG
81 TABLET ORAL DAILY
Qty: 0 | Refills: 0 | Status: DISCONTINUED | OUTPATIENT
Start: 2018-10-16 | End: 2018-10-25

## 2018-10-16 RX ORDER — DEXTROSE 50 % IN WATER 50 %
15 SYRINGE (ML) INTRAVENOUS ONCE
Qty: 0 | Refills: 0 | Status: DISCONTINUED | OUTPATIENT
Start: 2018-10-16 | End: 2018-10-25

## 2018-10-16 RX ORDER — DEXTROSE 50 % IN WATER 50 %
12.5 SYRINGE (ML) INTRAVENOUS ONCE
Qty: 0 | Refills: 0 | Status: DISCONTINUED | OUTPATIENT
Start: 2018-10-16 | End: 2018-10-25

## 2018-10-16 RX ADMIN — Medication 50 MILLIGRAM(S): at 16:56

## 2018-10-16 RX ADMIN — SODIUM CHLORIDE 500 MILLILITER(S): 9 INJECTION INTRAMUSCULAR; INTRAVENOUS; SUBCUTANEOUS at 13:37

## 2018-10-16 RX ADMIN — Medication 81 MILLIGRAM(S): at 16:56

## 2018-10-16 RX ADMIN — LISINOPRIL 5 MILLIGRAM(S): 2.5 TABLET ORAL at 17:04

## 2018-10-16 RX ADMIN — ATORVASTATIN CALCIUM 10 MILLIGRAM(S): 80 TABLET, FILM COATED ORAL at 21:29

## 2018-10-16 RX ADMIN — CLOPIDOGREL BISULFATE 75 MILLIGRAM(S): 75 TABLET, FILM COATED ORAL at 16:56

## 2018-10-16 RX ADMIN — Medication 25 MILLIGRAM(S): at 18:56

## 2018-10-16 RX ADMIN — MEMANTINE HYDROCHLORIDE 10 MILLIGRAM(S): 10 TABLET ORAL at 18:56

## 2018-10-16 RX ADMIN — Medication 1 DROP(S): at 21:29

## 2018-10-16 RX ADMIN — Medication 2: at 16:49

## 2018-10-16 NOTE — H&P ADULT - PMH
CAD (coronary artery disease)    Dementia    DM (diabetes mellitus)    HLD (hyperlipidemia)    HTN (hypertension)

## 2018-10-16 NOTE — H&P ADULT - RS GEN PE MLT RESP DETAILS PC
no rhonchi/breath sounds equal/no wheezes/no rales/respirations non-labored/good air movement/no intercostal retractions/airway patent/no chest wall tenderness/clear to auscultation bilaterally

## 2018-10-16 NOTE — H&P ADULT - NEGATIVE CARDIOVASCULAR SYMPTOMS
no chest pain/no paroxysmal nocturnal dyspnea/no dyspnea on exertion/no orthopnea/no peripheral edema/no claudication/no palpitations

## 2018-10-16 NOTE — H&P ADULT - NEGATIVE MUSCULOSKELETAL SYMPTOMS
no arm pain R/no leg pain R/no neck pain/no leg pain L/no myalgia/no stiffness/no arm pain L/no arthralgia

## 2018-10-16 NOTE — ED ADULT TRIAGE NOTE - CHIEF COMPLAINT QUOTE
Brought in by ems from home for loss of appetite and weakness. States today she had a near syncopal episode while walking. PMH: diabetes, htn, aortic stents, glaucoma, neuropathy in right foot.

## 2018-10-16 NOTE — H&P ADULT - ASSESSMENT
89 y/o female with a PMHx of CAD S/P stent placement, HTN, HLD, DM and dementia presents to ED with generalized weakness and near syncope in the setting of dehydration and FTT, found to be in hypertensive urgency.

## 2018-10-16 NOTE — ED PROVIDER NOTE - OBJECTIVE STATEMENT
89 yo female with PMH h/o HTN, HLD, DM, CAD s/p stent on ASA and plavix, presents to the episode for syncopal episodes at home and failure to thrive. Pt and her aid who is at bedside note about 4 syncopal episodes in the past two days at home without fall or trauma. Pt at her neuro baseline at this time. Endorsing fatigue and weakness, decreased appetite. Pt endorses she feels depressed since her  has been in the hospital for his own medical issue.     PMD: Dr. Akhil Anthony  Cardiologist: Dr. Fransisco Sanford

## 2018-10-16 NOTE — H&P ADULT - PROBLEM SELECTOR PLAN 1
Near syncope in the setting of dehydration and hyponatremia secondary to volume depletion from decreased PO intake  Admit to telemetry, serial EKGs and cardiac enzymes  Check urine cultures  Check HgA1C, FLP and TSH  Orthostatics ordered  Echocardiogram ordered  CT head ordered for fall on ASA and Plavix  X-ray pelvis ordered  PT eval ordered  F/U MD note

## 2018-10-16 NOTE — H&P ADULT - PROBLEM SELECTOR PLAN 3
Hyponatremia in the setting of decreased PO intake and volume depletion  S/P 500 cc bolus in ED  Start NS at 50 cc/hr as pt still appears dry on exam  Monitor BMP closely

## 2018-10-16 NOTE — H&P ADULT - HISTORY OF PRESENT ILLNESS
89 y/o female with a PMHx of CAD S/P stent placement, HTN, HLD, DM and dementia presents to ED with generalized weakness for the past couple of days. Pt lives in a one story home with her  and 24 hour home health aide; she ambulates with a walker with assistance. According to patient, her  has been in the hospital for a couple of days and she has been very concerned about him. She has not been able to come to the hospital to visit him and it has been making her somewhat depressed. For the past couple of days, pt admits to not eating or drinking very often because she is so concerned about her . Pt now admits to feeling extreme fatigue, malaise and generalized weakness. Pt has had several syncopal episodes over the past couple of days; pt says she gets so weak that she falls to the floor but does not lose consciousness. Her last episode was this morning when she was walking to the bathroom. Pt does say that she hit her lower back when she fell and has mild pain in her lower back, but she denies any head trauma. Pt denies fever, chills, recent travel, headache, dizziness, visual deficits, chest pain, shortness of breath, orthopnea, palpitations, abdominal pain, N/V/D/C, hematochezia, melena, dysuria, hematuria, LOC, urinary or fecal incontinence. Upon arrival to ED, EKG: Sinus bradycardia at 55 bpm with RBBB, TWI III, AVF, Q waves III, V1. CE x1: Trop 9. Na: 130. Glucose: 194. RVP: Negative. UA: Moderate LE. CXR: Clear lungs. No pleural effusions or pneumothorax. Left coronary stent and mitral annular calcifications noted. Stable heart size and remaining mediastinal and hilar contours. Trachea midline. Generalized osteopenia. No acute or focally aggressive appearing osseous abnormalities. Pt was given 1/2L NS in ED and is now admitted to telemetry.

## 2018-10-16 NOTE — ED ADULT NURSE NOTE - OBJECTIVE STATEMENT
Pt received to room 17 from home with aide at bedside for a near syncopal episode earlier today while walking. As per aide pt had similar symptoms the other day. Pt states she felt weak and fell. Denies LOC, hitting her head, chest pain, SOB, n/v/d, fever or chills. As per aide pt has had a decrease in appetite over the past few days. IV access obtained, labs drawn and sent.

## 2018-10-16 NOTE — H&P ADULT - PROBLEM SELECTOR PLAN 6
Start insulin sliding scale for coverage  Hold oral hypoglycemics (Glipizide)  Check HgA1C  Monitor finger sticks  Diabetic diet

## 2018-10-16 NOTE — H&P ADULT - PROBLEM SELECTOR PLAN 2
Patient hypertensive to 204/83 after 500 cc bolus  Will resume home dose Toprol 50mg XL daily and start Lisinopril 5mg daily (will give one dose stat)  Will uptitrate/add antihypertensives as needed  Monitor BP serially  Sodium restriction

## 2018-10-16 NOTE — H&P ADULT - NEGATIVE GASTROINTESTINAL SYMPTOMS
no nausea/no flatulence/no melena/no vomiting/no constipation/no change in bowel habits/no abdominal pain/no hematochezia/no diarrhea

## 2018-10-16 NOTE — PATIENT PROFILE ADULT - NSPROPATIENTLACTATING_GEN_A_NUR
no no/Patient has cognitive limitations and does NOT remember last menstrual date.  Today's date has been added in order to complete profile, PLEASE NOTE DATE ENTERED IS *NOT* TRUE LAST MENSTRUAL DATE.

## 2018-10-16 NOTE — ED PROVIDER NOTE - ATTENDING CONTRIBUTION TO CARE
Dr. Ding: I have personally performed a face to face bedside history and physical examination of this patient. I have discussed the history, examination, review of systems, assessment and plan of management with the resident. I have reviewed the electronic medical record and amended it to reflect my history, review of systems, physical exam, assessment and plan.    89 yo female with PMH h/o HTN, HLD, DM, CAD s/p stent on Plavix p/w generalized weakness, failure to thrive over last few days and also syncopal episodes at home while trying to get out of bed.  no head injury, f/c, cp.    on exam frail, afebrile  NC/AT  CTA b/l  RR s1 s2  alert, moving all extremities, equal strength    possibly orthostatic, r/o dysyrthmia, electrolyte abnormality, anemia, underling infection.  plan for labs, UA, CXR, IVFs, admit.

## 2018-10-16 NOTE — H&P ADULT - PROBLEM SELECTOR PLAN 4
Monitor on telemetry  Cardiac enzyme x 1 negative, hsTrop 2 pending  Continue ASA, Plavix, statin and Toprol  Echocardiogram ordered  DASH diet

## 2018-10-16 NOTE — H&P ADULT - NEUROLOGICAL DETAILS
normal strength/responds to pain/responds to verbal commands/sensation intact/cranial nerves intact/alert and oriented x 3

## 2018-10-16 NOTE — H&P ADULT - NEGATIVE NEUROLOGICAL SYMPTOMS
no weakness/no paresthesias/no generalized seizures/no tremors/no hemiparesis/no facial palsy/no focal seizures/no loss of consciousness/no headache/no transient paralysis/no vertigo/no loss of sensation/no difficulty walking/no confusion

## 2018-10-16 NOTE — H&P ADULT - NSHPLABSRESULTS_GEN_ALL_CORE
EKG: Sinus bradycardia at 55 bpm with RBBB, TWI III, AVF, Q waves III, V1  CE x1: Trop 9  Na: 130  Glucose: 194  RVP: Negative  UA: Moderate LE    10/16 CXR: Clear lungs. No pleural effusions or pneumothorax. Left coronary stent and mitral annular calcifications noted. Stable heart size and remaining mediastinal and hilar contours. Trachea midline. Generalized osteopenia. No acute or focally aggressive appearing osseous   abnormalities.

## 2018-10-16 NOTE — H&P ADULT - NEGATIVE OPHTHALMOLOGIC SYMPTOMS
no pain L/no pain R/no blurred vision L/no loss of vision R/no loss of vision L/no blurred vision R/no photophobia/no diplopia

## 2018-10-16 NOTE — H&P ADULT - NSHPSOCIALHISTORY_GEN_ALL_CORE
Pt is  and lives with her  and full time 24 hour home health aide. She ambulates with a walker, with assistance. She denies smoking and drinking. She did not get the flu shot this year.

## 2018-10-16 NOTE — H&P ADULT - NSHPSOURCEINFOTX_GEN_ALL_CORE
Pt is a reliable historian. Pt did not know all of her medications. Medication reconciliation obtained from home health aide Sierra at [phone].

## 2018-10-16 NOTE — ED PROVIDER NOTE - MEDICAL DECISION MAKING DETAILS
EKG, labs including troponin, UA, CXR, RVP, orthostatics, IV fluid, admit for syncope and failure to thrive w/u

## 2018-10-16 NOTE — H&P ADULT - ATTENDING COMMENTS
EKG -     a/p     1) Syncope - orthoststic + on IV fluids, neuro consult appreciated, has a systolic murmur, will get 2d echo to r/o AS    2) CAD s/p PCI - cont asa plavix     3) Bradycardia - will d/c metoprolol

## 2018-10-17 DIAGNOSIS — R80.9 PROTEINURIA, UNSPECIFIED: ICD-10-CM

## 2018-10-17 DIAGNOSIS — I10 ESSENTIAL (PRIMARY) HYPERTENSION: ICD-10-CM

## 2018-10-17 DIAGNOSIS — F43.21 ADJUSTMENT DISORDER WITH DEPRESSED MOOD: ICD-10-CM

## 2018-10-17 LAB
BUN SERPL-MCNC: 17 MG/DL — SIGNIFICANT CHANGE UP (ref 7–23)
CALCIUM SERPL-MCNC: 8.8 MG/DL — SIGNIFICANT CHANGE UP (ref 8.4–10.5)
CHLORIDE SERPL-SCNC: 95 MMOL/L — LOW (ref 98–107)
CHOLEST SERPL-MCNC: 140 MG/DL — SIGNIFICANT CHANGE UP (ref 120–199)
CO2 SERPL-SCNC: 24 MMOL/L — SIGNIFICANT CHANGE UP (ref 22–31)
CREAT ?TM UR-MCNC: 51.6 MG/DL — SIGNIFICANT CHANGE UP
CREAT SERPL-MCNC: 0.66 MG/DL — SIGNIFICANT CHANGE UP (ref 0.5–1.3)
GLUCOSE SERPL-MCNC: 147 MG/DL — HIGH (ref 70–99)
HBA1C BLD-MCNC: 7.8 % — HIGH (ref 4–5.6)
HCT VFR BLD CALC: 42.4 % — SIGNIFICANT CHANGE UP (ref 34.5–45)
HDLC SERPL-MCNC: 75 MG/DL — HIGH (ref 45–65)
HGB BLD-MCNC: 13.9 G/DL — SIGNIFICANT CHANGE UP (ref 11.5–15.5)
LIPID PNL WITH DIRECT LDL SERPL: 55 MG/DL — SIGNIFICANT CHANGE UP
MAGNESIUM SERPL-MCNC: 1.8 MG/DL — SIGNIFICANT CHANGE UP (ref 1.6–2.6)
MCHC RBC-ENTMCNC: 28.1 PG — SIGNIFICANT CHANGE UP (ref 27–34)
MCHC RBC-ENTMCNC: 32.8 % — SIGNIFICANT CHANGE UP (ref 32–36)
MCV RBC AUTO: 85.7 FL — SIGNIFICANT CHANGE UP (ref 80–100)
NRBC # FLD: 0 — SIGNIFICANT CHANGE UP
OSMOLALITY UR: 491 MOSMO/KG — SIGNIFICANT CHANGE UP (ref 50–1200)
PLATELET # BLD AUTO: 223 K/UL — SIGNIFICANT CHANGE UP (ref 150–400)
PMV BLD: 10.5 FL — SIGNIFICANT CHANGE UP (ref 7–13)
POTASSIUM SERPL-MCNC: 4 MMOL/L — SIGNIFICANT CHANGE UP (ref 3.5–5.3)
POTASSIUM SERPL-SCNC: 4 MMOL/L — SIGNIFICANT CHANGE UP (ref 3.5–5.3)
PROT UR-MCNC: 14.1 MG/DL — SIGNIFICANT CHANGE UP
RBC # BLD: 4.95 M/UL — SIGNIFICANT CHANGE UP (ref 3.8–5.2)
RBC # FLD: 14.9 % — HIGH (ref 10.3–14.5)
SODIUM SERPL-SCNC: 134 MMOL/L — LOW (ref 135–145)
SODIUM UR-SCNC: 76 MMOL/L — SIGNIFICANT CHANGE UP
TRIGL SERPL-MCNC: 88 MG/DL — SIGNIFICANT CHANGE UP (ref 10–149)
TSH SERPL-MCNC: 1.93 UIU/ML — SIGNIFICANT CHANGE UP (ref 0.27–4.2)
WBC # BLD: 9.99 K/UL — SIGNIFICANT CHANGE UP (ref 3.8–10.5)
WBC # FLD AUTO: 9.99 K/UL — SIGNIFICANT CHANGE UP (ref 3.8–10.5)

## 2018-10-17 PROCEDURE — 99221 1ST HOSP IP/OBS SF/LOW 40: CPT | Mod: GC

## 2018-10-17 RX ORDER — SODIUM CHLORIDE 9 MG/ML
1000 INJECTION INTRAMUSCULAR; INTRAVENOUS; SUBCUTANEOUS
Qty: 0 | Refills: 0 | Status: DISCONTINUED | OUTPATIENT
Start: 2018-10-17 | End: 2018-10-17

## 2018-10-17 RX ORDER — SODIUM CHLORIDE 9 MG/ML
1000 INJECTION INTRAMUSCULAR; INTRAVENOUS; SUBCUTANEOUS
Qty: 0 | Refills: 0 | Status: DISCONTINUED | OUTPATIENT
Start: 2018-10-17 | End: 2018-10-18

## 2018-10-17 RX ORDER — LISINOPRIL 2.5 MG/1
10 TABLET ORAL DAILY
Qty: 0 | Refills: 0 | Status: DISCONTINUED | OUTPATIENT
Start: 2018-10-18 | End: 2018-10-18

## 2018-10-17 RX ORDER — HYDRALAZINE HCL 50 MG
25 TABLET ORAL ONCE
Qty: 0 | Refills: 0 | Status: COMPLETED | OUTPATIENT
Start: 2018-10-17 | End: 2018-10-17

## 2018-10-17 RX ADMIN — SODIUM CHLORIDE 50 MILLILITER(S): 9 INJECTION INTRAMUSCULAR; INTRAVENOUS; SUBCUTANEOUS at 03:24

## 2018-10-17 RX ADMIN — Medication 81 MILLIGRAM(S): at 11:49

## 2018-10-17 RX ADMIN — SODIUM CHLORIDE 50 MILLILITER(S): 9 INJECTION INTRAMUSCULAR; INTRAVENOUS; SUBCUTANEOUS at 09:27

## 2018-10-17 RX ADMIN — Medication 1 DROP(S): at 17:09

## 2018-10-17 RX ADMIN — LISINOPRIL 5 MILLIGRAM(S): 2.5 TABLET ORAL at 05:32

## 2018-10-17 RX ADMIN — Medication 25 MILLIGRAM(S): at 09:00

## 2018-10-17 RX ADMIN — Medication 1: at 11:46

## 2018-10-17 RX ADMIN — ATORVASTATIN CALCIUM 10 MILLIGRAM(S): 80 TABLET, FILM COATED ORAL at 22:34

## 2018-10-17 RX ADMIN — CLOPIDOGREL BISULFATE 75 MILLIGRAM(S): 75 TABLET, FILM COATED ORAL at 11:49

## 2018-10-17 RX ADMIN — ENOXAPARIN SODIUM 40 MILLIGRAM(S): 100 INJECTION SUBCUTANEOUS at 11:49

## 2018-10-17 RX ADMIN — Medication 2: at 16:49

## 2018-10-17 RX ADMIN — MEMANTINE HYDROCHLORIDE 10 MILLIGRAM(S): 10 TABLET ORAL at 17:09

## 2018-10-17 RX ADMIN — Medication 1 DROP(S): at 08:09

## 2018-10-17 RX ADMIN — Medication 50 MILLIGRAM(S): at 05:33

## 2018-10-17 RX ADMIN — MEMANTINE HYDROCHLORIDE 10 MILLIGRAM(S): 10 TABLET ORAL at 05:33

## 2018-10-17 RX ADMIN — SODIUM CHLORIDE 75 MILLILITER(S): 9 INJECTION INTRAMUSCULAR; INTRAVENOUS; SUBCUTANEOUS at 22:35

## 2018-10-17 NOTE — CONSULT NOTE ADULT - ASSESSMENT
89 y/o female with a PMHx of CAD S/P stent placement, HTN, HLD, DM and dementia admitted for generalized weakness and near syncope in the setting of dehydration and FTT, found to be in hypertensive urgency.

## 2018-10-17 NOTE — CONSULT NOTE ADULT - SUBJECTIVE AND OBJECTIVE BOX
Neurology Consult    Reason for consult: Syncope    HPI:  89 y/o female with a PMHx of CAD S/P stent placement, HTN, HLD, DM and dementia admitted for syncope. As per patient, no loss of consciousness, has a hx of R foot drop for which she has not followed up with a neurologist, has caused problems walking and caused her to fall in the past. Did not feel faint during time of episode. Denies focal weakness, sensory changes, urinary/fecal incontinence, loss of balance.     REVIEW OF SYSTEMS:  As above    MEDICATIONS  acetaminophen   Tablet .. 650 milliGRAM(s) Oral every 6 hours PRN  aspirin enteric coated 81 milliGRAM(s) Oral daily  atorvastatin 10 milliGRAM(s) Oral at bedtime  clopidogrel Tablet 75 milliGRAM(s) Oral daily  dextrose 40% Gel 15 Gram(s) Oral once PRN  dextrose 5%. 1000 milliLiter(s) IV Continuous <Continuous>  dextrose 50% Injectable 12.5 Gram(s) IV Push once  dextrose 50% Injectable 25 Gram(s) IV Push once  dextrose 50% Injectable 25 Gram(s) IV Push once  enoxaparin Injectable 40 milliGRAM(s) SubCutaneous every 24 hours  glucagon  Injectable 1 milliGRAM(s) IntraMuscular once PRN  influenza   Vaccine 0.5 milliLiter(s) IntraMuscular once  insulin lispro (HumaLOG) corrective regimen sliding scale   SubCutaneous three times a day before meals  insulin lispro (HumaLOG) corrective regimen sliding scale   SubCutaneous at bedtime  memantine 10 milliGRAM(s) Oral two times a day  metoprolol succinate ER 50 milliGRAM(s) Oral daily  sodium chloride 0.9%. 1000 milliLiter(s) IV Continuous <Continuous>  timolol 0.5% Solution 1 Drop(s) Both EYES every 12 hours      PMH: Dementia  CAD (coronary artery disease)  DM (diabetes mellitus)  HLD (hyperlipidemia)  HTN (hypertension)       PSH: S/P coronary artery stent placement  No significant past surgical history      FAMILY HISTORY:  No pertinent family history in first degree relatives    No history of dementia, strokes, or seizures     SOCIAL HISTORY:  No history of tobacco or alcohol use     Allergies    No Known Allergies    Intolerances    Vital Signs Last 24 Hrs  T(C): 36.7 (17 Oct 2018 14:53), Max: 36.8 (16 Oct 2018 20:42)  T(F): 98 (17 Oct 2018 14:53), Max: 98.2 (16 Oct 2018 20:42)  HR: 60 (17 Oct 2018 14:53) (55 - 65)  BP: 146/72 (17 Oct 2018 14:53) (143/73 - 204/83)  BP(mean): --  RR: 18 (17 Oct 2018 14:53) (17 - 18)  SpO2: 98% (17 Oct 2018 14:53) (98% - 100%)    Neurological Examination:    Mental Status: Patient is alert, awake, oriented. Patient is fluent, no dysarthria, no aphasia. Follows commands well and able to answer questions appropriately. Mood and affect normal.    Cranial Nerves: PERRL, EOMI, visual field intact, V1-V3 intact, no gross facial asymmetry, tongue/uvula midline    Motor Exam: No drift  Right upper extremity: 5/5  Left upper extremity: 5/5  Right lower extremity: 5/5 hip flexors, knee flex/ext, 1/5 dorsiflexion, 5/5 plantarflexion  Left lower extremity: 5/5    Normal bulk/tone    Sensory: Intact to light touch bilaterally. No extinction    Coordination: Finger to nose intact bilaterally     Reflexes: Bilateral 2+ Biceps, Brachial, Patellar, Ankle  Plantar: Down bilateral    GENERAL: No acute distress  HEENT:  Normocephalic, atraumatic  EXTREMITIES: No edema, clubbing, cyanosis  MUSCULOSKELETAL: Normal range of motion  SKIN: No rashes    LABS:  CBC Full  -  ( 17 Oct 2018 05:40 )  WBC Count : 9.99 K/uL  Hemoglobin : 13.9 g/dL  Hematocrit : 42.4 %  Platelet Count - Automated : 223 K/uL  Mean Cell Volume : 85.7 fL  Mean Cell Hemoglobin : 28.1 pg  Mean Cell Hemoglobin Concentration : 32.8 %  Auto Neutrophil # : x  Auto Lymphocyte # : x  Auto Monocyte # : x  Auto Eosinophil # : x  Auto Basophil # : x  Auto Neutrophil % : x  Auto Lymphocyte % : x  Auto Monocyte % : x  Auto Eosinophil % : x  Auto Basophil % : x    Urinalysis Basic - ( 16 Oct 2018 16:00 )    Color: YELLOW / Appearance: Lt TURBID / S.020 / pH: 6.0  Gluc: 150 / Ketone: SMALL  / Bili: NEGATIVE / Urobili: NORMAL   Blood: NEGATIVE / Protein: 10 / Nitrite: NEGATIVE   Leuk Esterase: MODERATE / RBC: 6-10 / WBC 11-25   Sq Epi: MODERATE / Non Sq Epi: x / Bacteria: NEGATIVE      10-17    134<L>  |  95<L>  |  17  ----------------------------<  147<H>  4.0   |  24  |  0.66    Ca    8.8      17 Oct 2018 05:40  Mg     1.8     10-17    TPro  6.8  /  Alb  3.6  /  TBili  0.5  /  DBili  x   /  AST  18  /  ALT  16  /  AlkPhos  91  10-16    LIVER FUNCTIONS - ( 16 Oct 2018 11:43 )  Alb: 3.6 g/dL / Pro: 6.8 g/dL / ALK PHOS: 91 u/L / ALT: 16 u/L / AST: 18 u/L / GGT: x           Hemoglobin A1C: Hemoglobin A1C, Whole Blood: 7.8 % (10-17 @ 05:40)    Lipid Panel 10-17 @ 05:40  Total Cholesterol, Serum 140  LDL 55  Triglycerides 88    RADIOLOGY:  CT head:  MRI:

## 2018-10-17 NOTE — CONSULT NOTE ADULT - ASSESSMENT
Pt reported feeling unusually depressed in the hospital, citing disinterest in leisure activities like watching TV. Pt also distressed that  also admitted to the hospital and having not recently seen him. When asked about preferred activities at home, pt indicated enjoying crossword puzzles and was provided them by clinician. Pt also demonstrated isolated moments of confusion, such as stating that she had a son, only to later correct herself that she only had two daughters. Patient noted that this depression is transient in nature, and directly related to her current hospitalization.     While patient demonstrates some symptoms of depressed mood, but does not demonstrate symptoms of clinical depression at this time. Patient responded positively to encouragement and distraction.

## 2018-10-17 NOTE — CONSULT NOTE ADULT - PROBLEM SELECTOR RECOMMENDATION 2
BP improving however still elevated   started on lisinopril 10mg   IF remains elevated, increase lisinopril to 20mg QD  Monitor BP  Low salt diet

## 2018-10-17 NOTE — CONSULT NOTE ADULT - ASSESSMENT
89 y/o female with a PMHx of CAD S/P stent placement, HTN, HLD, DM and dementia admitted for syncope. CT head with ex vacuo changes, low suspicion for NPH as patient denies unsteadiness or incontinence. Events not characteristic of neurogenic etiology of loss of consciousness.     Recs:  TSH, B12, Folate  Continue PT for foot drop, ambulation 89 y/o female with a PMHx of CAD S/P stent placement, HTN, HLD, DM and dementia admitted for syncope. CT head with ex vacuo changes, low suspicion for NPH as patient denies unsteadiness or incontinence. Events not characteristic of neurogenic etiology of loss of consciousness.     Recs:  TSH, B12, Folate  Continue PT for foot drop, ambulation  No further neurologic workup 89 y/o female with a PMHx of CAD S/P stent placement, HTN, HLD, DM and dementia admitted for syncope. CT head with ex vacuo changes, low suspicion for NPH as patient denies unsteadiness or incontinence. Events not characteristic of neurogenic etiology of loss of consciousness.     Recs:    TSH, B12, Folate  Continue PT for foot drop, ambulation  No further neurologic workup and continue medical management and supportive care.

## 2018-10-17 NOTE — CONSULT NOTE ADULT - ATTENDING COMMENTS
Patient seen by ASHLEY Hatfield, Psychology Extern under my supervision
Patient seen and examined with neurology team and above note reviewed and I agree with assessment and plan as outlined. Patient admitted for fall and patient states she felt her legs buckle. No reported dizziness or lightheaded feeling.  She denies any urine incontinence or shuffling gait.  Exam shows right chronic foot drop and slow and cautious gait but no other focal cranial nerve or motor dysfunction.   CT head reviewed and suggestive of enlarged ventricles however clinical picture does not correlate.   Continue PT and medical mgt and send for blood work as outlined above.  Rule out any potential underlying infection, UTI etc..  All questions answered and she understood plan.
EKG -     Assessmrnt/Rec     - Syncope - Orthoststic  Hypotension-on IV fluids, ,CHECK echo,orthostatic BP,Neurology /Cardiology consult appreciated.  - CAD s/p PCI - cont asa plavix   - Bradycardia - Agree with stopping metoprolol  -HLD-Atorvostatin  -DM-FS with coverage  -Hyponatremia-likely vol.depleted-IV fluids.Renal consult appreciated  -abn u/a-f/u cultures  --DVT Prophylaxis  -PT eval  -Thanks,will f/u

## 2018-10-17 NOTE — CONSULT NOTE ADULT - SUBJECTIVE AND OBJECTIVE BOX
DRE CHRISTINA  89y  Female      Patient is a 89y old  Female who presents with a chief complaint of Syncope (17 Oct 2018 16:27)        HPI:  89 y/o female with a PMHx of CAD S/P stent placement, HTN, HLD, DM and dementia presents to ED with generalized weakness for the past couple of days. Pt lives in a one story home with her  and 24 hour home health aide; she ambulates with a walker with assistance. According to patient, her  has been in the hospital for a couple of days and she has been very concerned about him. She has not been able to come to the hospital to visit him and it has been making her somewhat depressed. For the past couple of days, pt admits to not eating or drinking very often because she is so concerned about her . Pt now admits to feeling extreme fatigue, malaise and generalized weakness. Pt has had several syncopal episodes over the past couple of days; pt says she gets so weak that she falls to the floor but does not lose consciousness. Her last episode was this morning when she was walking to the bathroom. Pt does say that she hit her lower back when she fell and has mild pain in her lower back, but she denies any head trauma. Pt denies fever, chills, recent travel, headache, dizziness, visual deficits, chest pain, shortness of breath, orthopnea, palpitations, abdominal pain, N/V/D/C, hematochezia, melena, dysuria, hematuria, LOC, urinary or fecal incontinence. Upon arrival to ED, EKG: Sinus bradycardia at 55 bpm with RBBB, TWI III, AVF, Q waves III, V1. CE x1: Trop 9. Na: 130. Glucose: 194. RVP: Negative. UA: Moderate LE. CXR: Clear lungs. No pleural effusions or pneumothorax. Left coronary stent and mitral annular calcifications noted. Stable heart size and remaining mediastinal and hilar contours. Trachea midline. Generalized osteopenia. No acute or focally aggressive appearing osseous abnormalities. Pt was given 1/2L NS in ED and is now admitted to telemetry. (16 Oct 2018 16:59)  -stable,comfortable,nad  INTERVAL HPI/OVERNIGHT EVENTS:  Medications:MEDICATIONS  (STANDING):  aspirin enteric coated 81 milliGRAM(s) Oral daily  atorvastatin 10 milliGRAM(s) Oral at bedtime  clopidogrel Tablet 75 milliGRAM(s) Oral daily  dextrose 5%. 1000 milliLiter(s) (50 mL/Hr) IV Continuous <Continuous>  dextrose 50% Injectable 12.5 Gram(s) IV Push once  dextrose 50% Injectable 25 Gram(s) IV Push once  dextrose 50% Injectable 25 Gram(s) IV Push once  enoxaparin Injectable 40 milliGRAM(s) SubCutaneous every 24 hours  influenza   Vaccine 0.5 milliLiter(s) IntraMuscular once  insulin lispro (HumaLOG) corrective regimen sliding scale   SubCutaneous three times a day before meals  insulin lispro (HumaLOG) corrective regimen sliding scale   SubCutaneous at bedtime  memantine 10 milliGRAM(s) Oral two times a day  sodium chloride 0.9%. 1000 milliLiter(s) (75 mL/Hr) IV Continuous <Continuous>  timolol 0.5% Solution 1 Drop(s) Both EYES every 12 hours    MEDICATIONS  (PRN):  acetaminophen   Tablet .. 650 milliGRAM(s) Oral every 6 hours PRN Mild Pain (1 - 3), Moderate Pain (4 - 6)  dextrose 40% Gel 15 Gram(s) Oral once PRN Blood Glucose LESS THAN 70 milliGRAM(s)/deciliter  glucagon  Injectable 1 milliGRAM(s) IntraMuscular once PRN Glucose LESS THAN 70 milligrams/deciliter      Allergies: Allergies    No Known Allergies    Intolerances        REVIEW OF SYSTEMS:  no fever,no cough,no sob,no vomiing,no diarrhoea,no reported pain    T(C): 36.4 (10-17-18 @ 19:24), Max: 36.7 (10-17-18 @ 14:53)  HR: 65 (10-17-18 @ 19:24) (55 - 65)  BP: 129/62 (10-17-18 @ 19:24) (129/62 - 175/90)  RR: 18 (10-17-18 @ 19:24) (18 - 18)  SpO2: 100% (10-17-18 @ 19:24) (98% - 100%)  Wt(kg): --Vital Signs Last 24 Hrs  T(C): 36.4 (17 Oct 2018 19:24), Max: 36.7 (17 Oct 2018 14:53)  T(F): 97.6 (17 Oct 2018 19:24), Max: 98 (17 Oct 2018 14:53)  HR: 65 (17 Oct 2018 19:24) (55 - 65)  BP: 129/62 (17 Oct 2018 19:24) (129/62 - 175/90)  BP(mean): --  RR: 18 (17 Oct 2018 19:24) (18 - 18)  SpO2: 100% (17 Oct 2018 19:24) (98% - 100%)  I&O's Summary      PHYSICAL EXAM:  GENERAL: NAD, , well-developed  HEAD:  Atraumatic, Normocephalic  EYES: EOMI, PERRLA, conjunctiva and sclera clear  ENMT: No tonsillar erythema, exudates, or enlargement; Moist mucous membranes, Good dentition, No lesions  NECK: Supple, No JVD, Normal thyroid  NERVOUS SYSTEM:  Alert & Oriented X1, Good concentration; Motor Strength 5/5 B/L upper and lower extremities; DTRs 2+ intact and symmetric  CHEST/LUNG: Clear to percussion bilaterally; No rales, rhonchi, wheezing, or rubs  HEART: Regular rate and rhythm; No murmurs, rubs, or gallops  ABDOMEN: Soft, Nontender, Nondistended; Bowel sounds present  EXTREMITIES:  2+ Peripheral Pulses, No clubbing, cyanosis, or edema  LYMPH: No lymphadenopathy noted  SKIN: No rashes or lesions    Consultant(s) Notes Reviewed:  [x ] YES  [ ] NO  Care Discussed with Consultants/Other Providers [ x] YES  [ ] NO    LABS:                        13.9   9.99  )-----------( 223      ( 17 Oct 2018 05:40 )             42.4     10-17    134<L>  |  95<L>  |  17  ----------------------------<  147<H>  4.0   |  24  |  0.66    Ca    8.8      17 Oct 2018 05:40  Mg     1.8     10-17    TPro  6.8  /  Alb  3.6  /  TBili  0.5  /  DBili  x   /  AST  18  /  ALT  16  /  AlkPhos  91  10-16      Urinalysis Basic - ( 16 Oct 2018 16:00 )    Color: YELLOW / Appearance: Lt TURBID / S.020 / pH: 6.0  Gluc: 150 / Ketone: SMALL  / Bili: NEGATIVE / Urobili: NORMAL   Blood: NEGATIVE / Protein: 10 / Nitrite: NEGATIVE   Leuk Esterase: MODERATE / RBC: 6-10 / WBC 11-25   Sq Epi: MODERATE / Non Sq Epi: x / Bacteria: NEGATIVE      CAPILLARY BLOOD GLUCOSE      POCT Blood Glucose.: 143 mg/dL (17 Oct 2018 21:03)  POCT Blood Glucose.: 201 mg/dL (17 Oct 2018 16:36)  POCT Blood Glucose.: 182 mg/dL (17 Oct 2018 11:37)  POCT Blood Glucose.: 131 mg/dL (17 Oct 2018 07:42)        Urinalysis Basic - ( 16 Oct 2018 16:00 )    Color: YELLOW / Appearance: Lt TURBID / S.020 / pH: 6.0  Gluc: 150 / Ketone: SMALL  / Bili: NEGATIVE / Urobili: NORMAL   Blood: NEGATIVE / Protein: 10 / Nitrite: NEGATIVE   Leuk Esterase: MODERATE / RBC: 6-10 / WBC 11-25   Sq Epi: MODERATE / Non Sq Epi: x / Bacteria: NEGATIVE        RADIOLOGY & ADDITIONAL TESTS:    Imaging Personally Reviewed:  [ ] YES  [ ] NO

## 2018-10-17 NOTE — CONSULT NOTE ADULT - PROBLEM SELECTOR RECOMMENDATION 9
Pt with hyponatremia likely sec to hypovolumic hyponatremia in setting of decreased PO intake  Pt given IVF with improvement in serum sodium  Continue IVF (currently on NS at 75cc/hr )  Check Urine Sodium, Urine Osmo, TSH, Cortisol  Monitor serum sodium

## 2018-10-17 NOTE — CONSULT NOTE ADULT - SUBJECTIVE AND OBJECTIVE BOX
[facility]   MD DENVER DUBOSE MD RUORU WONG, PA    TEL:  OFFICE: [phone]  DR CRISTINA CELL: [phone]  OLGA RUBIN CELL: [phone]  DR. MAYES CELL: [phone]    -- INITIAL RENAL CONSULT NOTE  --------------------------------------------------------------------------------  HPI:  89 y/o female with a PMHx of CAD S/P stent placement, HTN, HLD, DM and dementia admitted for generalized weakness and near syncope in the setting of dehydration and FTT, found to be in hypertensive urgency.   Nephrology consulted for hyponatremia. Pt denies any prior history of hyponatremia. Pt reports decreased appetitie for few weeks. Denies nausea, vomitting, diarrhea. Reports leg pain sec to neuropathy. Reports DM ~> 10yrs.       PAST HISTORY  --------------------------------------------------------------------------------  PAST MEDICAL & SURGICAL HISTORY:  Dementia  CAD (coronary artery disease)  DM (diabetes mellitus)  HLD (hyperlipidemia)  HTN (hypertension)  S/P coronary artery stent placement    FAMILY HISTORY:  No pertinent family history in first degree relatives    PAST SOCIAL HISTORY:    ALLERGIES & MEDICATIONS  --------------------------------------------------------------------------------  Allergies    No Known Allergies    Intolerances      Standing Inpatient Medications  aspirin enteric coated 81 milliGRAM(s) Oral daily  atorvastatin 10 milliGRAM(s) Oral at bedtime  clopidogrel Tablet 75 milliGRAM(s) Oral daily  dextrose 5%. 1000 milliLiter(s) IV Continuous <Continuous>  dextrose 50% Injectable 12.5 Gram(s) IV Push once  dextrose 50% Injectable 25 Gram(s) IV Push once  dextrose 50% Injectable 25 Gram(s) IV Push once  enoxaparin Injectable 40 milliGRAM(s) SubCutaneous every 24 hours  influenza   Vaccine 0.5 milliLiter(s) IntraMuscular once  insulin lispro (HumaLOG) corrective regimen sliding scale   SubCutaneous three times a day before meals  insulin lispro (HumaLOG) corrective regimen sliding scale   SubCutaneous at bedtime  memantine 10 milliGRAM(s) Oral two times a day  metoprolol succinate ER 50 milliGRAM(s) Oral daily  sodium chloride 0.9%. 1000 milliLiter(s) IV Continuous <Continuous>  timolol 0.5% Solution 1 Drop(s) Both EYES every 12 hours    PRN Inpatient Medications  acetaminophen   Tablet .. 650 milliGRAM(s) Oral every 6 hours PRN  dextrose 40% Gel 15 Gram(s) Oral once PRN  glucagon  Injectable 1 milliGRAM(s) IntraMuscular once PRN      REVIEW OF SYSTEMS  --------------------------------------------------------------------------------  Gen: No fevers/chills  Skin: No rashes  Head/Eyes/Ears: Normal hearing,  Normal vision   Respiratory: No dyspnea, cough  CV: No chest pain  GI: No abdominal pain,   : No dysuria, hematuria  MSK: No  edema  Heme: No easy bruising or bleeding  Psych: No significant depression    All other systems were reviewed and are negative, except as noted.    VITALS/PHYSICAL EXAM  --------------------------------------------------------------------------------  T(C): 36.4 (10-17-18 @ 08:46), Max: 36.8 (10-16-18 @ 20:42)  HR: 55 (10-17-18 @ 08:46) (55 - 65)  BP: 173/84 (10-17-18 @ 08:46) (143/73 - 204/83)  RR: 18 (10-17-18 @ 08:46) (17 - 18)  SpO2: 99% (10-17-18 @ 08:46) (98% - 100%)  Wt(kg): --  Height (cm): 157.48 (10-16-18 @ 16:59)  Weight (kg): 45.4 (10-16-18 @ 16:59)  BMI (kg/m2): 18.3 (10-16-18 @ 16:59)  BSA (m2): 1.42 (10-16-18 @ 16:59)      Physical Exam:  	Gen: NAD  	HEENT: MMM  	Pulm: CTA B/L  	CV: S1S2  	Abd: Soft, +BS   	Ext: No LE edema B/L  	Neuro: Awake  	Skin: Warm and dry      LABS/STUDIES  --------------------------------------------------------------------------------              13.9   9.99  >-----------<  223      [10-17-18 @ 05:40]              42.4     134  |  95  |  17  ----------------------------<  147      [10-17-18 @ 05:40]  4.0   |  24  |  0.66        Ca     8.8     [10-17-18 @ 05:40]      Mg     1.8     [10-17-18 @ 05:40]    TPro  6.8  /  Alb  3.6  /  TBili  0.5  /  DBili  x   /  AST  18  /  ALT  16  /  AlkPhos  91  [10-16-18 @ 11:43]          Creatinine Trend:  SCr 0.66 [10-17 @ 05:40]  SCr 0.73 [10-16 @ 11:43]    Urinalysis - [10-16-18 @ 16:00]      Color YELLOW / Appearance Lt TURBID / SG 1.020 / pH 6.0      Gluc 150 / Ketone SMALL  / Bili NEGATIVE / Urobili NORMAL       Blood NEGATIVE / Protein 10 / Leuk Est MODERATE / Nitrite NEGATIVE      RBC 6-10 / WBC 11-25 / Hyaline NEGATIVE / Gran  / Sq Epi MODERATE / Non Sq Epi  / Bacteria NEGATIVE      HbA1c 7.8      [10-17-18 @ 05:40]  TSH 1.93      [10-17-18 @ 05:40]  Lipid: chol 140, TG 88, HDL 75, LDL 55      [10-17-18 @ 05:40]

## 2018-10-17 NOTE — CONSULT NOTE ADULT - SUBJECTIVE AND OBJECTIVE BOX
Initial consult requested by primary care team for coping with hospitalization. Pt A&Ox3, unable to recall date, believing it was the year 2021. Pt denies current S/HI and A/VH. Speech tone, volume, and rate all WNL. Pt made appropriate eye contact during consult and appeared somewhat dejected about being in hospital. Patient looking forward to feeling better and discharge. Demonstrated hopefulness in this regard. Pt generally demonstrated logical thought process.

## 2018-10-18 LAB
BACTERIA UR CULT: SIGNIFICANT CHANGE UP
BASOPHILS # BLD AUTO: 0.03 K/UL — SIGNIFICANT CHANGE UP (ref 0–0.2)
BASOPHILS NFR BLD AUTO: 0.3 % — SIGNIFICANT CHANGE UP (ref 0–2)
BUN SERPL-MCNC: 16 MG/DL — SIGNIFICANT CHANGE UP (ref 7–23)
BUN SERPL-MCNC: 16 MG/DL — SIGNIFICANT CHANGE UP (ref 7–23)
CALCIUM SERPL-MCNC: 9.1 MG/DL — SIGNIFICANT CHANGE UP (ref 8.4–10.5)
CALCIUM SERPL-MCNC: 9.1 MG/DL — SIGNIFICANT CHANGE UP (ref 8.4–10.5)
CHLORIDE SERPL-SCNC: 97 MMOL/L — LOW (ref 98–107)
CHLORIDE SERPL-SCNC: 97 MMOL/L — LOW (ref 98–107)
CO2 SERPL-SCNC: 25 MMOL/L — SIGNIFICANT CHANGE UP (ref 22–31)
CO2 SERPL-SCNC: 25 MMOL/L — SIGNIFICANT CHANGE UP (ref 22–31)
CORTIS SERPL-MCNC: 16 UG/DL — SIGNIFICANT CHANGE UP (ref 2.7–18.4)
CREAT SERPL-MCNC: 0.6 MG/DL — SIGNIFICANT CHANGE UP (ref 0.5–1.3)
CREAT SERPL-MCNC: 0.6 MG/DL — SIGNIFICANT CHANGE UP (ref 0.5–1.3)
EOSINOPHIL # BLD AUTO: 0.17 K/UL — SIGNIFICANT CHANGE UP (ref 0–0.5)
EOSINOPHIL NFR BLD AUTO: 1.9 % — SIGNIFICANT CHANGE UP (ref 0–6)
FOLATE SERPL-MCNC: 12.5 NG/ML — SIGNIFICANT CHANGE UP (ref 4.7–20)
GLUCOSE SERPL-MCNC: 149 MG/DL — HIGH (ref 70–99)
GLUCOSE SERPL-MCNC: 149 MG/DL — HIGH (ref 70–99)
HCT VFR BLD CALC: 41.5 % — SIGNIFICANT CHANGE UP (ref 34.5–45)
HGB BLD-MCNC: 13.5 G/DL — SIGNIFICANT CHANGE UP (ref 11.5–15.5)
IMM GRANULOCYTES # BLD AUTO: 0.04 # — SIGNIFICANT CHANGE UP
IMM GRANULOCYTES NFR BLD AUTO: 0.4 % — SIGNIFICANT CHANGE UP (ref 0–1.5)
LYMPHOCYTES # BLD AUTO: 1.45 K/UL — SIGNIFICANT CHANGE UP (ref 1–3.3)
LYMPHOCYTES # BLD AUTO: 16 % — SIGNIFICANT CHANGE UP (ref 13–44)
MAGNESIUM SERPL-MCNC: 1.8 MG/DL — SIGNIFICANT CHANGE UP (ref 1.6–2.6)
MCHC RBC-ENTMCNC: 28.3 PG — SIGNIFICANT CHANGE UP (ref 27–34)
MCHC RBC-ENTMCNC: 32.5 % — SIGNIFICANT CHANGE UP (ref 32–36)
MCV RBC AUTO: 87 FL — SIGNIFICANT CHANGE UP (ref 80–100)
MONOCYTES # BLD AUTO: 0.68 K/UL — SIGNIFICANT CHANGE UP (ref 0–0.9)
MONOCYTES NFR BLD AUTO: 7.5 % — SIGNIFICANT CHANGE UP (ref 2–14)
NEUTROPHILS # BLD AUTO: 6.67 K/UL — SIGNIFICANT CHANGE UP (ref 1.8–7.4)
NEUTROPHILS NFR BLD AUTO: 73.9 % — SIGNIFICANT CHANGE UP (ref 43–77)
NRBC # FLD: 0 — SIGNIFICANT CHANGE UP
PHOSPHATE SERPL-MCNC: 3.4 MG/DL — SIGNIFICANT CHANGE UP (ref 2.5–4.5)
PLATELET # BLD AUTO: 198 K/UL — SIGNIFICANT CHANGE UP (ref 150–400)
PMV BLD: 10.6 FL — SIGNIFICANT CHANGE UP (ref 7–13)
POTASSIUM SERPL-MCNC: 4 MMOL/L — SIGNIFICANT CHANGE UP (ref 3.5–5.3)
POTASSIUM SERPL-MCNC: 4 MMOL/L — SIGNIFICANT CHANGE UP (ref 3.5–5.3)
POTASSIUM SERPL-SCNC: 4 MMOL/L — SIGNIFICANT CHANGE UP (ref 3.5–5.3)
POTASSIUM SERPL-SCNC: 4 MMOL/L — SIGNIFICANT CHANGE UP (ref 3.5–5.3)
RBC # BLD: 4.77 M/UL — SIGNIFICANT CHANGE UP (ref 3.8–5.2)
RBC # FLD: 15.1 % — HIGH (ref 10.3–14.5)
SODIUM SERPL-SCNC: 136 MMOL/L — SIGNIFICANT CHANGE UP (ref 135–145)
SODIUM SERPL-SCNC: 136 MMOL/L — SIGNIFICANT CHANGE UP (ref 135–145)
SPECIMEN SOURCE: SIGNIFICANT CHANGE UP
TSH SERPL-MCNC: 2.03 UIU/ML — SIGNIFICANT CHANGE UP (ref 0.27–4.2)
VIT B12 SERPL-MCNC: > 2000 PG/ML — HIGH (ref 200–900)
WBC # BLD: 9.04 K/UL — SIGNIFICANT CHANGE UP (ref 3.8–10.5)
WBC # FLD AUTO: 9.04 K/UL — SIGNIFICANT CHANGE UP (ref 3.8–10.5)

## 2018-10-18 RX ORDER — SODIUM CHLORIDE 9 MG/ML
1000 INJECTION INTRAMUSCULAR; INTRAVENOUS; SUBCUTANEOUS
Qty: 0 | Refills: 0 | Status: DISCONTINUED | OUTPATIENT
Start: 2018-10-18 | End: 2018-10-22

## 2018-10-18 RX ORDER — LISINOPRIL 2.5 MG/1
20 TABLET ORAL DAILY
Qty: 0 | Refills: 0 | Status: DISCONTINUED | OUTPATIENT
Start: 2018-10-18 | End: 2018-10-25

## 2018-10-18 RX ORDER — HYDRALAZINE HCL 50 MG
25 TABLET ORAL ONCE
Qty: 0 | Refills: 0 | Status: COMPLETED | OUTPATIENT
Start: 2018-10-18 | End: 2018-10-18

## 2018-10-18 RX ADMIN — Medication 1: at 17:01

## 2018-10-18 RX ADMIN — LISINOPRIL 10 MILLIGRAM(S): 2.5 TABLET ORAL at 06:09

## 2018-10-18 RX ADMIN — ATORVASTATIN CALCIUM 10 MILLIGRAM(S): 80 TABLET, FILM COATED ORAL at 22:28

## 2018-10-18 RX ADMIN — MEMANTINE HYDROCHLORIDE 10 MILLIGRAM(S): 10 TABLET ORAL at 17:03

## 2018-10-18 RX ADMIN — Medication 1: at 11:45

## 2018-10-18 RX ADMIN — CLOPIDOGREL BISULFATE 75 MILLIGRAM(S): 75 TABLET, FILM COATED ORAL at 11:06

## 2018-10-18 RX ADMIN — Medication 1 DROP(S): at 17:03

## 2018-10-18 RX ADMIN — MEMANTINE HYDROCHLORIDE 10 MILLIGRAM(S): 10 TABLET ORAL at 06:09

## 2018-10-18 RX ADMIN — ENOXAPARIN SODIUM 40 MILLIGRAM(S): 100 INJECTION SUBCUTANEOUS at 11:06

## 2018-10-18 RX ADMIN — Medication 81 MILLIGRAM(S): at 11:06

## 2018-10-18 RX ADMIN — Medication 1 DROP(S): at 06:10

## 2018-10-18 RX ADMIN — Medication 25 MILLIGRAM(S): at 22:28

## 2018-10-18 NOTE — PROGRESS NOTE ADULT - ATTENDING COMMENTS
EKG -     Assessmrnt/Rec     - Syncope - Orthoststic  Hypotension-on IV fluids, ,CHECK echo,orthostatic BP,Neurology /Cardiology consult appreciated.  - CAD s/p PCI - cont asa plavix   - Bradycardia - Agree with stopping metoprolol  -HLD-Atorvostatin  -DM-FS with coverage  -Hyponatremia-likely vol.depleted-IV fluids.Renal f/u noted  -abn u/a-f/u cultures-likely contamination  --DVT Prophylaxis  -PT eval

## 2018-10-18 NOTE — CHART NOTE - NSCHARTNOTEFT_GEN_A_CORE
Follow-up visit with pt for coping with hospital stay, standing at pt bedside. Pt A&Ox3, unable to recall date. Pt denies current S/HI and A/VH. Speech tone, volume, and rate all WNL. Pt made appropriate eye contact during consult and exhibited restricted affect consistent with reported disappointment about remaining in hospital. Pt hopeful about returning to comfort of home. Pt demonstrated logical thought process.    Supportive psychotherapy provided in response to pt discussing her struggle with being away from the comforts and familiarity of her home environment. Patient struggling with increasing age-related ependence on others but recognizes her strong social supports.  Pt appreciative that  was able to visit with her yesterday. Pt optimistic about recovering soon and returning home. Follow-up visit with pt for coping with hospital stay, standing at pt bedside. Pt A&Ox3, unable to recall date. Pt denies current S/HI and A/VH. Speech tone, volume, and rate all WNL. Pt made appropriate eye contact during consult and exhibited restricted affect consistent with reported disappointment about remaining in hospital. Pt hopeful about returning to comfort of home. Pt demonstrated logical thought process.    Supportive psychotherapy provided in response to pt discussing her struggle with being away from the comforts and familiarity of her home environment. Patient struggling with increasing age-related dependence on others but recognizes her strong social supports.  Pt appreciative that  was able to visit with her yesterday. Pt optimistic about recovering soon and returning home. Follow-up visit with pt for coping with hospital stay, standing at pt bedside. Pt A&Ox3, unable to recall date. Pt denies current S/HI and A/VH. Speech tone, volume, and rate all WNL. Pt made appropriate eye contact during consult and exhibited restricted affect consistent with reported disappointment about remaining in hospital. Pt hopeful about returning to comfort of home. Pt demonstrated logical thought process.    Supportive psychotherapy provided in response to pt discussing her struggle with being away from the comforts and familiarity of her home environment. Patient struggling with increasing age-related dependence on others but recognizes her strong social supports.  Pt appreciative that  was able to visit with her yesterday. Pt optimistic about recovering soon and returning home.    *This patient was seen by ASHLEY Hatfield, Psychology Extern, under my supervision.

## 2018-10-18 NOTE — PROGRESS NOTE ADULT - PROBLEM SELECTOR PLAN 1
Pt with hyponatremia likely sec to hypovolumic hyponatremia in setting of decreased PO intake  Pt given IVF with improvement in serum sodium  Continue IVF (currently on NS at 75cc/hr )  Urine studies suggestive of  ? SIADH   TSH WNL, Cortisol now low   Monitor serum sodium.

## 2018-10-19 ENCOUNTER — TRANSCRIPTION ENCOUNTER (OUTPATIENT)
Age: 83
End: 2018-10-19

## 2018-10-19 LAB
BASOPHILS # BLD AUTO: 0.04 K/UL — SIGNIFICANT CHANGE UP (ref 0–0.2)
BASOPHILS NFR BLD AUTO: 0.3 % — SIGNIFICANT CHANGE UP (ref 0–2)
BUN SERPL-MCNC: 16 MG/DL — SIGNIFICANT CHANGE UP (ref 7–23)
CALCIUM SERPL-MCNC: 9.4 MG/DL — SIGNIFICANT CHANGE UP (ref 8.4–10.5)
CHLORIDE SERPL-SCNC: 98 MMOL/L — SIGNIFICANT CHANGE UP (ref 98–107)
CO2 SERPL-SCNC: 26 MMOL/L — SIGNIFICANT CHANGE UP (ref 22–31)
CREAT SERPL-MCNC: 0.64 MG/DL — SIGNIFICANT CHANGE UP (ref 0.5–1.3)
EOSINOPHIL # BLD AUTO: 0.2 K/UL — SIGNIFICANT CHANGE UP (ref 0–0.5)
EOSINOPHIL NFR BLD AUTO: 1.7 % — SIGNIFICANT CHANGE UP (ref 0–6)
GLUCOSE SERPL-MCNC: 159 MG/DL — HIGH (ref 70–99)
HCT VFR BLD CALC: 43.5 % — SIGNIFICANT CHANGE UP (ref 34.5–45)
HGB BLD-MCNC: 14.1 G/DL — SIGNIFICANT CHANGE UP (ref 11.5–15.5)
IMM GRANULOCYTES # BLD AUTO: 0.05 # — SIGNIFICANT CHANGE UP
IMM GRANULOCYTES NFR BLD AUTO: 0.4 % — SIGNIFICANT CHANGE UP (ref 0–1.5)
LYMPHOCYTES # BLD AUTO: 18.9 % — SIGNIFICANT CHANGE UP (ref 13–44)
LYMPHOCYTES # BLD AUTO: 2.18 K/UL — SIGNIFICANT CHANGE UP (ref 1–3.3)
MAGNESIUM SERPL-MCNC: 1.7 MG/DL — SIGNIFICANT CHANGE UP (ref 1.6–2.6)
MCHC RBC-ENTMCNC: 27.9 PG — SIGNIFICANT CHANGE UP (ref 27–34)
MCHC RBC-ENTMCNC: 32.4 % — SIGNIFICANT CHANGE UP (ref 32–36)
MCV RBC AUTO: 86.1 FL — SIGNIFICANT CHANGE UP (ref 80–100)
MONOCYTES # BLD AUTO: 0.8 K/UL — SIGNIFICANT CHANGE UP (ref 0–0.9)
MONOCYTES NFR BLD AUTO: 6.9 % — SIGNIFICANT CHANGE UP (ref 2–14)
NEUTROPHILS # BLD AUTO: 8.28 K/UL — HIGH (ref 1.8–7.4)
NEUTROPHILS NFR BLD AUTO: 71.8 % — SIGNIFICANT CHANGE UP (ref 43–77)
NRBC # FLD: 0 — SIGNIFICANT CHANGE UP
PHOSPHATE SERPL-MCNC: 3.2 MG/DL — SIGNIFICANT CHANGE UP (ref 2.5–4.5)
PLATELET # BLD AUTO: 240 K/UL — SIGNIFICANT CHANGE UP (ref 150–400)
PMV BLD: 10.2 FL — SIGNIFICANT CHANGE UP (ref 7–13)
POTASSIUM SERPL-MCNC: 4.2 MMOL/L — SIGNIFICANT CHANGE UP (ref 3.5–5.3)
POTASSIUM SERPL-SCNC: 4.2 MMOL/L — SIGNIFICANT CHANGE UP (ref 3.5–5.3)
RBC # BLD: 5.05 M/UL — SIGNIFICANT CHANGE UP (ref 3.8–5.2)
RBC # FLD: 15.5 % — HIGH (ref 10.3–14.5)
SODIUM SERPL-SCNC: 136 MMOL/L — SIGNIFICANT CHANGE UP (ref 135–145)
WBC # BLD: 11.55 K/UL — HIGH (ref 3.8–10.5)
WBC # FLD AUTO: 11.55 K/UL — HIGH (ref 3.8–10.5)

## 2018-10-19 RX ORDER — SODIUM CHLORIDE 9 MG/ML
1000 INJECTION INTRAMUSCULAR; INTRAVENOUS; SUBCUTANEOUS
Qty: 0 | Refills: 0 | Status: DISCONTINUED | OUTPATIENT
Start: 2018-10-19 | End: 2018-10-20

## 2018-10-19 RX ORDER — GABAPENTIN 400 MG/1
100 CAPSULE ORAL THREE TIMES A DAY
Qty: 0 | Refills: 0 | Status: DISCONTINUED | OUTPATIENT
Start: 2018-10-19 | End: 2018-10-22

## 2018-10-19 RX ADMIN — MEMANTINE HYDROCHLORIDE 10 MILLIGRAM(S): 10 TABLET ORAL at 06:27

## 2018-10-19 RX ADMIN — ENOXAPARIN SODIUM 40 MILLIGRAM(S): 100 INJECTION SUBCUTANEOUS at 11:06

## 2018-10-19 RX ADMIN — SODIUM CHLORIDE 75 MILLILITER(S): 9 INJECTION INTRAMUSCULAR; INTRAVENOUS; SUBCUTANEOUS at 12:22

## 2018-10-19 RX ADMIN — LISINOPRIL 20 MILLIGRAM(S): 2.5 TABLET ORAL at 06:27

## 2018-10-19 RX ADMIN — CLOPIDOGREL BISULFATE 75 MILLIGRAM(S): 75 TABLET, FILM COATED ORAL at 11:06

## 2018-10-19 RX ADMIN — Medication 1 DROP(S): at 06:27

## 2018-10-19 RX ADMIN — GABAPENTIN 100 MILLIGRAM(S): 400 CAPSULE ORAL at 22:27

## 2018-10-19 RX ADMIN — Medication 1: at 12:20

## 2018-10-19 RX ADMIN — ATORVASTATIN CALCIUM 10 MILLIGRAM(S): 80 TABLET, FILM COATED ORAL at 22:27

## 2018-10-19 RX ADMIN — MEMANTINE HYDROCHLORIDE 10 MILLIGRAM(S): 10 TABLET ORAL at 17:24

## 2018-10-19 RX ADMIN — Medication 1 DROP(S): at 17:24

## 2018-10-19 RX ADMIN — Medication 81 MILLIGRAM(S): at 11:06

## 2018-10-19 NOTE — DISCHARGE NOTE ADULT - CARE PROVIDER_API CALL
Dr. Fransisco goldsmith  or   Phone: (   )    -  Fax: (   )    - Dr. Fransisco goldsmith  or   Phone: (   )    -  Fax: (   )    -    jaya alvarez  Phone: (   )    -  Fax: (   )    -

## 2018-10-19 NOTE — DISCHARGE NOTE ADULT - PLAN OF CARE
to be free of any further syncopal episodes Follow up with Cradiology DR FRyler as out pt in 2-3 weeks continue taking medications as scheduled- Follow up with Cardiology as out pt Wear compression stockings - Get OOB bed slowly - Dangle feet slowly and get OOB slow continue taking medications as scheduled- Follow up with Cardiology as out pt  added Lisinopril and Norvasc to regimen

## 2018-10-19 NOTE — DISCHARGE NOTE ADULT - HOSPITAL COURSE
89 y/o female with a PMHx of CAD S/P stent placement, HTN, HLD, DM and dementia presents to ED with generalized weakness and near syncope in the setting of dehydration and FTT.    + Syncope- likely secondary to dehydration/FTT, S/P 1/2L NS in ED, on NS at 50 cc/hr  + Hypertensive urgency- Metoprolol resumed, started Lisinopril 5mg daily  + Hyponatremia- S/P 1/2L NS in ED, now on NS at 50 cc/hr        Near syncope.    Near syncope in the setting of dehydration and hyponatremia 2/2 volume depletion from decreased PO intake  Admit to telemetry, serial EKGs and cardiac enzymes WNL  UCx containated, no s+s of infection, no need for ABx  HgA1C 7.8, FLP WNL and TSH 1.96  Orthostatics positive  Echocardiogram ordered---  CT head ordered for fall on ASA and Plavix - No acute intracranial pathology. Microvascular ischemic change. Disproportionate prominence of the ventricular system with areas of focal sulcal prominence may represent changes of normal pressure hydrocephalus   Neuro consulted - B12, TSH, cortisol and folate WNL, no further work up or intervention  X-ray pelvis - Generalized osteopenia otherwise no discrete lytic or blastic lesions.  PT eval - home with home PT    Hypertensive urgency.    Patient hypertensive to 204/83 after 500 cc bolus  Toprol/ Lisinopril;  Metoprolol stopped for bradycardia, Lisinopril increased to 10mg PO daily  Will uptitrate/add antihypertensives as needed  Monitor BP serially  Sodium restriction.     Hyponatremia.   Hyponatremia in the setting of decreased PO intake and volume depletion  resolved s/p IVF  Monitor BMP closely.     CAD (coronary artery disease).    Monitor on telemetry  Cardiac enzyme x 2 negative  Continue ASA, Plavix, statin; toprol D/C'd for bradycardia  Echocardiogram ordered----  DASH diet.     HLD (hyperlipidemia).    Continue statin  FLP WNL  DASH diet.     DM (diabetes mellitus).  Start insulin sliding scale for coverage  Hold oral hypoglycemics (Glipizide)  HgA1C 7.8  Monitor finger sticks  Diabetic diet.    Dementia.    Continue Namenda.     Prophylactic measure. 89 y/o female with a PMHx of CAD S/P stent placement, HTN, HLD, DM and dementia presents to ED with generalized weakness and near syncope in the setting of dehydration and FTT.    CAD (coronary artery disease).    Monitor on telemetry  Cardiac enzyme x 2 negative  Continue ASA, Plavix, statin;   toprol D/C'd for bradycardia      Near syncope.     2/2 volume depletion from decreased PO intake   serial EKGs and cardiac enzymes WNL  Orthostatics positive  Cardiology Dr Jack on board   JESSICA-Mitral annular calcification, otherwise normal mitral  valve. Mild-moderate mitral regurgitation.  2. Calcified trileaflet aortic valve with decreased  opening. Peak transaortic valve gradient equals 43 mm Hg,  mean transaortic valve gradient equals 23 mm Hg, estimated  aortic valve area equals 0.5 sqcm (by continuity equation  and by planimetry), consistent with severe aortic stenosis.  Minimal aortic regurgitation.  3. Normal aortic root.  Mild non-mobile atherosclerotic  plaque in the aortic arch and descending thoracic aorta.  4. Normal left atrium.  No left atrial or left atrial  appendage thrombus.  5. Normal left ventricular systolic function. No segmental  wall motion abnormalities.  6. Normal right ventricular size and function.  7. Contrast injection demonstrates no evidence of a patent  foramen ovale.      CT head - No acute intracranial pathology. Microvascular ischemic change. s   Neuro consulted - B12, TSH, cortisol and folate WNL, no further work up or intervention  X-ray pelvis - Generalized osteopenia otherwise no discrete lytic or blastic lesions.      Hypertensive urgency.    Resolving   Metoprolol stopped for bradycardia, Lisinopril increased to 20mg PO daily      Hyponatremia.   Hyponatremia in the setting of decreased PO intake and volume depletion  resolved s/p IVF      CAD (coronary artery disease).    Monitor on telemetry  Cardiac enzyme x 2 negative    Dispo Home with home care

## 2018-10-19 NOTE — DISCHARGE NOTE ADULT - PROVIDER TOKENS
FREE:[LAST:[agus],PHONE:[(   )    -],FAX:[(   )    -],ADDRESS:[Dr. Fransisco Sanford  or ]] FREE:[LAST:[agus],PHONE:[(   )    -],FAX:[(   )    -],ADDRESS:[Dr. Fransisco Sanford  or ]],FREE:[LAST:[good],FIRST:[jaya],PHONE:[(   )    -],FAX:[(   )    -]]

## 2018-10-19 NOTE — DISCHARGE NOTE ADULT - HOME CARE AGENCY
[facility] [phone], [phone].  Nurse to visit on the day after discharge.  Other appropriate services to be arranged thereafter.

## 2018-10-19 NOTE — DISCHARGE NOTE ADULT - SECONDARY DIAGNOSIS.
HLD (hyperlipidemia) Dementia CAD (coronary artery disease) Hypertensive urgency Hyponatremia Orthostatic hypotension

## 2018-10-19 NOTE — DISCHARGE NOTE ADULT - MEDICATION SUMMARY - MEDICATIONS TO TAKE
I will START or STAY ON the medications listed below when I get home from the hospital:    aspirin 81 mg oral tablet  -- 1 tab(s) by mouth once a day  -- Indication: For CAD (coronary artery disease)    acetaminophen 325 mg oral tablet  -- 2 tab(s) by mouth every 6 hours, As needed, Mild Pain (1 - 3), Moderate Pain (4 - 6)  -- Indication: For Pain    lisinopril 20 mg oral tablet  -- 1 tab(s) by mouth once a day  -- Indication: For HTN (hypertension)    gabapentin 300 mg oral capsule  -- 1 cap(s) by mouth 2 times a day  -- Indication: For Neuropathy    glipiZIDE 5 mg oral tablet  -- 1 tab(s) by mouth 2 times a day  -- Indication: For DM (diabetes mellitus)    lovastatin 10 mg oral tablet  -- 1 tab(s) by mouth once a day  -- Indication: For HLD (hyperlipidemia)    Plavix 75 mg oral tablet  -- 1 tab(s) by mouth once a day  -- Indication: For CAD (coronary artery disease)    amLODIPine 5 mg oral tablet  -- 1 tab(s) by mouth once a day  -- Indication: For HTN (hypertension)    Namenda XR 28 mg oral capsule, extended release  -- 1 cap(s) by mouth once a day  -- Indication: For Dementia    timolol hemihydrate 0.5% ophthalmic solution  -- 1 drop(s) to each affected eye 2 times a day  -- Indication: For Need for prophylactic measure

## 2018-10-19 NOTE — DISCHARGE NOTE ADULT - PATIENT PORTAL LINK FT
You can access the [facility] Patient Portal, offered by [facility], by registering with the following website: [email or web address]

## 2018-10-19 NOTE — DISCHARGE NOTE ADULT - CARE PLAN
Principal Discharge DX:	Syncope  Secondary Diagnosis:	HLD (hyperlipidemia)  Secondary Diagnosis:	Dementia  Secondary Diagnosis:	CAD (coronary artery disease)  Secondary Diagnosis:	Hypertensive urgency  Secondary Diagnosis:	Hyponatremia Principal Discharge DX:	Syncope  Goal:	to be free of any further syncopal episodes  Assessment and plan of treatment:	Follow up with Cradiology DR FRyler as out pt in 2-3 weeks  Secondary Diagnosis:	Hypertensive urgency  Assessment and plan of treatment:	continue taking medications as scheduled- Follow up with Cardiology as out pt  Secondary Diagnosis:	Orthostatic hypotension  Assessment and plan of treatment:	Wear compression stockings - Get OOB bed slowly - Dangle feet slowly and get OOB slow Principal Discharge DX:	Syncope  Goal:	to be free of any further syncopal episodes  Assessment and plan of treatment:	Follow up with Cradiology DR FRyler as out pt in 2-3 weeks  Secondary Diagnosis:	Hypertensive urgency  Assessment and plan of treatment:	continue taking medications as scheduled- Follow up with Cardiology as out pt  added Lisinopril and Norvasc to regimen  Secondary Diagnosis:	Orthostatic hypotension  Assessment and plan of treatment:	Wear compression stockings - Get OOB bed slowly - Dangle feet slowly and get OOB slow

## 2018-10-19 NOTE — PROGRESS NOTE ADULT - ATTENDING COMMENTS
EKG -     Assessmrnt/Rec     - Syncope - Orthoststic  Hypotension- improving,on IV fluids, ,CHECK echo,  - CAD s/p PCI - cont asa plavix   - Bradycardia - Agree with stopping metoprolol  -HLD-Atorvostatin  -DM-FS with coverage  -Neuropathy, likely sec.to DM,WILL START NEURONTIN  100 MG three times per day,increase as tolerated and needed.  -Hyponatremia-likely vol.depleted-IV fluids.Renal f/u noted  -abn u/a-f/u cultures-likely contamination  --DVT Prophylaxis  -PT eval

## 2018-10-20 LAB
BUN SERPL-MCNC: 14 MG/DL — SIGNIFICANT CHANGE UP (ref 7–23)
CALCIUM SERPL-MCNC: 9.1 MG/DL — SIGNIFICANT CHANGE UP (ref 8.4–10.5)
CHLORIDE SERPL-SCNC: 97 MMOL/L — LOW (ref 98–107)
CO2 SERPL-SCNC: 23 MMOL/L — SIGNIFICANT CHANGE UP (ref 22–31)
CREAT SERPL-MCNC: 0.61 MG/DL — SIGNIFICANT CHANGE UP (ref 0.5–1.3)
GLUCOSE SERPL-MCNC: 169 MG/DL — HIGH (ref 70–99)
HCT VFR BLD CALC: 40.6 % — SIGNIFICANT CHANGE UP (ref 34.5–45)
HGB BLD-MCNC: 13.2 G/DL — SIGNIFICANT CHANGE UP (ref 11.5–15.5)
MCHC RBC-ENTMCNC: 28 PG — SIGNIFICANT CHANGE UP (ref 27–34)
MCHC RBC-ENTMCNC: 32.5 % — SIGNIFICANT CHANGE UP (ref 32–36)
MCV RBC AUTO: 86 FL — SIGNIFICANT CHANGE UP (ref 80–100)
NRBC # FLD: 0 — SIGNIFICANT CHANGE UP
PLATELET # BLD AUTO: 227 K/UL — SIGNIFICANT CHANGE UP (ref 150–400)
PMV BLD: 10.7 FL — SIGNIFICANT CHANGE UP (ref 7–13)
POTASSIUM SERPL-MCNC: 3.9 MMOL/L — SIGNIFICANT CHANGE UP (ref 3.5–5.3)
POTASSIUM SERPL-SCNC: 3.9 MMOL/L — SIGNIFICANT CHANGE UP (ref 3.5–5.3)
RBC # BLD: 4.72 M/UL — SIGNIFICANT CHANGE UP (ref 3.8–5.2)
RBC # FLD: 15.4 % — HIGH (ref 10.3–14.5)
SODIUM SERPL-SCNC: 136 MMOL/L — SIGNIFICANT CHANGE UP (ref 135–145)
WBC # BLD: 8.83 K/UL — SIGNIFICANT CHANGE UP (ref 3.8–10.5)
WBC # FLD AUTO: 8.83 K/UL — SIGNIFICANT CHANGE UP (ref 3.8–10.5)

## 2018-10-20 PROCEDURE — 93306 TTE W/DOPPLER COMPLETE: CPT | Mod: 26

## 2018-10-20 RX ORDER — HYDRALAZINE HCL 50 MG
10 TABLET ORAL ONCE
Qty: 0 | Refills: 0 | Status: DISCONTINUED | OUTPATIENT
Start: 2018-10-20 | End: 2018-10-20

## 2018-10-20 RX ORDER — SODIUM CHLORIDE 9 MG/ML
1000 INJECTION INTRAMUSCULAR; INTRAVENOUS; SUBCUTANEOUS
Qty: 0 | Refills: 0 | Status: DISCONTINUED | OUTPATIENT
Start: 2018-10-20 | End: 2018-10-22

## 2018-10-20 RX ORDER — INFLUENZA VIRUS VACCINE 15; 15; 15; 15 UG/.5ML; UG/.5ML; UG/.5ML; UG/.5ML
0.5 SUSPENSION INTRAMUSCULAR ONCE
Qty: 0 | Refills: 0 | Status: COMPLETED | OUTPATIENT
Start: 2018-10-20 | End: 2018-10-25

## 2018-10-20 RX ORDER — HYDRALAZINE HCL 50 MG
25 TABLET ORAL
Qty: 0 | Refills: 0 | Status: DISCONTINUED | OUTPATIENT
Start: 2018-10-20 | End: 2018-10-22

## 2018-10-20 RX ADMIN — Medication 25 MILLIGRAM(S): at 18:14

## 2018-10-20 RX ADMIN — Medication 1: at 12:16

## 2018-10-20 RX ADMIN — MEMANTINE HYDROCHLORIDE 10 MILLIGRAM(S): 10 TABLET ORAL at 05:53

## 2018-10-20 RX ADMIN — ENOXAPARIN SODIUM 40 MILLIGRAM(S): 100 INJECTION SUBCUTANEOUS at 12:16

## 2018-10-20 RX ADMIN — Medication 1: at 08:33

## 2018-10-20 RX ADMIN — GABAPENTIN 100 MILLIGRAM(S): 400 CAPSULE ORAL at 21:35

## 2018-10-20 RX ADMIN — CLOPIDOGREL BISULFATE 75 MILLIGRAM(S): 75 TABLET, FILM COATED ORAL at 12:16

## 2018-10-20 RX ADMIN — LISINOPRIL 20 MILLIGRAM(S): 2.5 TABLET ORAL at 05:53

## 2018-10-20 RX ADMIN — MEMANTINE HYDROCHLORIDE 10 MILLIGRAM(S): 10 TABLET ORAL at 17:13

## 2018-10-20 RX ADMIN — GABAPENTIN 100 MILLIGRAM(S): 400 CAPSULE ORAL at 05:53

## 2018-10-20 RX ADMIN — Medication 1: at 21:36

## 2018-10-20 RX ADMIN — GABAPENTIN 100 MILLIGRAM(S): 400 CAPSULE ORAL at 13:09

## 2018-10-20 RX ADMIN — Medication 1 DROP(S): at 05:53

## 2018-10-20 RX ADMIN — Medication 1: at 17:12

## 2018-10-20 RX ADMIN — SODIUM CHLORIDE 50 MILLILITER(S): 9 INJECTION INTRAMUSCULAR; INTRAVENOUS; SUBCUTANEOUS at 21:34

## 2018-10-20 RX ADMIN — ATORVASTATIN CALCIUM 10 MILLIGRAM(S): 80 TABLET, FILM COATED ORAL at 21:35

## 2018-10-20 RX ADMIN — Medication 81 MILLIGRAM(S): at 12:16

## 2018-10-20 RX ADMIN — Medication 1 DROP(S): at 17:12

## 2018-10-20 RX ADMIN — SODIUM CHLORIDE 75 MILLILITER(S): 9 INJECTION INTRAMUSCULAR; INTRAVENOUS; SUBCUTANEOUS at 17:13

## 2018-10-20 NOTE — PROGRESS NOTE ADULT - ATTENDING COMMENTS
EKG -     Assessmrnt/Rec     - Syncope - Orthoststic  Hypotension- improving,on IV fluids, ,CHECK echo,  - CAD s/p PCI - cont asa plavix   - Bradycardia - Agree with stopping metoprolol.HTN-cont.lisinopril,decrease IVF,MONITOR  -HLD-Atorvostatin  -DM-FS with coverage  -Neuropathy, likely sec.to DM,WILL START NEURONTIN  100 MG three times per day,increase as tolerated and needed.  -Hyponatremia-likely vol.depleted-IV fluids.Renal f/u noted  -abn u/a-f/u cultures-likely contamination  --DVT Prophylaxis  -PT eval EKG -     Assessmrnt/Rec     - Syncope - Orthoststic  Hypotension- improving,on IV fluids, ,CHECK echo,  - CAD s/p PCI - cont asa plavix   - Bradycardia - Agree with stopping metoprolol.HTN-cont.lisinopril,decrease IVF,MONITOR.add.hydralazine 25 mg po twice per day  -HLD-Atorvostatin  -DM-FS with coverage  -Neuropathy, likely sec.to DM,WILL START NEURONTIN  100 MG three times per day,increase as tolerated and needed.  -Hyponatremia-likely vol.depleted-IV fluids.Renal f/u noted  -abn u/a-f/u cultures-likely contamination  --DVT Prophylaxis  -PT eval

## 2018-10-20 NOTE — PROGRESS NOTE ADULT - PROBLEM SELECTOR PLAN 2
very orthostatic, getting IVF  d/c hydralazine as it can worsen orthostatic   Monitor BP  Low salt diet.

## 2018-10-21 RX ADMIN — GABAPENTIN 100 MILLIGRAM(S): 400 CAPSULE ORAL at 21:40

## 2018-10-21 RX ADMIN — MEMANTINE HYDROCHLORIDE 10 MILLIGRAM(S): 10 TABLET ORAL at 18:02

## 2018-10-21 RX ADMIN — Medication 650 MILLIGRAM(S): at 21:38

## 2018-10-21 RX ADMIN — Medication 0: at 21:43

## 2018-10-21 RX ADMIN — ENOXAPARIN SODIUM 40 MILLIGRAM(S): 100 INJECTION SUBCUTANEOUS at 13:01

## 2018-10-21 RX ADMIN — Medication 1 DROP(S): at 18:02

## 2018-10-21 RX ADMIN — Medication 650 MILLIGRAM(S): at 20:38

## 2018-10-21 RX ADMIN — Medication 81 MILLIGRAM(S): at 13:02

## 2018-10-21 RX ADMIN — SODIUM CHLORIDE 50 MILLILITER(S): 9 INJECTION INTRAMUSCULAR; INTRAVENOUS; SUBCUTANEOUS at 05:53

## 2018-10-21 RX ADMIN — Medication 25 MILLIGRAM(S): at 06:00

## 2018-10-21 RX ADMIN — MEMANTINE HYDROCHLORIDE 10 MILLIGRAM(S): 10 TABLET ORAL at 06:05

## 2018-10-21 RX ADMIN — ATORVASTATIN CALCIUM 10 MILLIGRAM(S): 80 TABLET, FILM COATED ORAL at 21:40

## 2018-10-21 RX ADMIN — GABAPENTIN 100 MILLIGRAM(S): 400 CAPSULE ORAL at 13:03

## 2018-10-21 RX ADMIN — GABAPENTIN 100 MILLIGRAM(S): 400 CAPSULE ORAL at 05:54

## 2018-10-21 RX ADMIN — CLOPIDOGREL BISULFATE 75 MILLIGRAM(S): 75 TABLET, FILM COATED ORAL at 13:02

## 2018-10-21 RX ADMIN — Medication 25 MILLIGRAM(S): at 18:02

## 2018-10-21 RX ADMIN — Medication 1: at 08:24

## 2018-10-21 RX ADMIN — Medication 1: at 17:09

## 2018-10-21 NOTE — PROGRESS NOTE ADULT - PROBLEM SELECTOR PLAN 2
Optimal today  BP is very orthostatic. d/c hydralazine as it can worsen orthostatic   Monitor BP  Low salt diet.

## 2018-10-21 NOTE — PROGRESS NOTE ADULT - ATTENDING COMMENTS
EKG -     Assessmrnt/Rec     - Syncope - Orthoststic  Hypotension- improving,on IV fluids, ,CHECK echo,  - CAD s/p PCI - cont asa plavix   - Bradycardia - Agree with stopping metoprolol.HTN-cont.lisinopril,decrease IVF,MONITOR.add.hydralazine 25 mg po twice per day  -HLD-Atorvostatin  -DM-FS with coverage  -Neuropathy, likely sec.to DM,cont. NEURONTIN  100 MG three times per day,increase as tolerated and needed.  -Hyponatremia-likely vol.depleted-IV fluids.Renal f/u noted.IMPROVED.  -abn u/a-f/u cultures-likely contamination  --DVT Prophylaxis  -PT eval.d/c planning

## 2018-10-21 NOTE — CHART NOTE - NSCHARTNOTEFT_GEN_A_CORE
Called by RN 2/2 bilateral feet redness, R>L. b/l feet warm to touch, +distal pulses, -edema.  Bilateral elayne stockings on.       Vital Signs Last 24 Hrs  T(C): 36.8 (21 Oct 2018 20:46), Max: 36.9 (21 Oct 2018 05:50)  T(F): 98.2 (21 Oct 2018 20:46), Max: 98.4 (21 Oct 2018 05:50)  HR: 71 (21 Oct 2018 20:46) (71 - 93)  BP: 140/90 (21 Oct 2018 20:46) (110/60 - 160/82)  BP(mean): --  RR: 17 (21 Oct 2018 20:46) (17 - 18)  SpO2: 97% (21 Oct 2018 20:46) (97% - 100%)    -pt remains afebrile  -wbc wnl  -remove elayne stockings x 4 hours tonight

## 2018-10-21 NOTE — PROGRESS NOTE ADULT - PROBLEM SELECTOR PLAN 1
Pt with hyponatremia likely sec to hypovolemic hyponatremia in setting of decreased PO intake  Pt given IVF with improvement in serum sodium  Continue IVF (currently on NS at 50cc/hr )  Urine studies suggestive of  ? SIADH   TSH WNL, Cortisol now low   Monitor serum sodium.

## 2018-10-22 LAB
BUN SERPL-MCNC: 17 MG/DL — SIGNIFICANT CHANGE UP (ref 7–23)
CALCIUM SERPL-MCNC: 9.1 MG/DL — SIGNIFICANT CHANGE UP (ref 8.4–10.5)
CHLORIDE SERPL-SCNC: 102 MMOL/L — SIGNIFICANT CHANGE UP (ref 98–107)
CO2 SERPL-SCNC: 24 MMOL/L — SIGNIFICANT CHANGE UP (ref 22–31)
CREAT SERPL-MCNC: 0.71 MG/DL — SIGNIFICANT CHANGE UP (ref 0.5–1.3)
GLUCOSE SERPL-MCNC: 166 MG/DL — HIGH (ref 70–99)
HCT VFR BLD CALC: 39.3 % — SIGNIFICANT CHANGE UP (ref 34.5–45)
HGB BLD-MCNC: 12.7 G/DL — SIGNIFICANT CHANGE UP (ref 11.5–15.5)
MCHC RBC-ENTMCNC: 28.3 PG — SIGNIFICANT CHANGE UP (ref 27–34)
MCHC RBC-ENTMCNC: 32.3 % — SIGNIFICANT CHANGE UP (ref 32–36)
MCV RBC AUTO: 87.7 FL — SIGNIFICANT CHANGE UP (ref 80–100)
NRBC # FLD: 0 — SIGNIFICANT CHANGE UP
PLATELET # BLD AUTO: 235 K/UL — SIGNIFICANT CHANGE UP (ref 150–400)
PMV BLD: 10.9 FL — SIGNIFICANT CHANGE UP (ref 7–13)
POTASSIUM SERPL-MCNC: 4.1 MMOL/L — SIGNIFICANT CHANGE UP (ref 3.5–5.3)
POTASSIUM SERPL-SCNC: 4.1 MMOL/L — SIGNIFICANT CHANGE UP (ref 3.5–5.3)
RBC # BLD: 4.48 M/UL — SIGNIFICANT CHANGE UP (ref 3.8–5.2)
RBC # FLD: 15.4 % — HIGH (ref 10.3–14.5)
SODIUM SERPL-SCNC: 138 MMOL/L — SIGNIFICANT CHANGE UP (ref 135–145)
WBC # BLD: 8.57 K/UL — SIGNIFICANT CHANGE UP (ref 3.8–10.5)
WBC # FLD AUTO: 8.57 K/UL — SIGNIFICANT CHANGE UP (ref 3.8–10.5)

## 2018-10-22 RX ORDER — GABAPENTIN 400 MG/1
300 CAPSULE ORAL
Qty: 0 | Refills: 0 | Status: DISCONTINUED | OUTPATIENT
Start: 2018-10-22 | End: 2018-10-25

## 2018-10-22 RX ADMIN — ATORVASTATIN CALCIUM 10 MILLIGRAM(S): 80 TABLET, FILM COATED ORAL at 22:29

## 2018-10-22 RX ADMIN — GABAPENTIN 100 MILLIGRAM(S): 400 CAPSULE ORAL at 06:48

## 2018-10-22 RX ADMIN — Medication 650 MILLIGRAM(S): at 16:34

## 2018-10-22 RX ADMIN — MEMANTINE HYDROCHLORIDE 10 MILLIGRAM(S): 10 TABLET ORAL at 06:48

## 2018-10-22 RX ADMIN — MEMANTINE HYDROCHLORIDE 10 MILLIGRAM(S): 10 TABLET ORAL at 17:14

## 2018-10-22 RX ADMIN — CLOPIDOGREL BISULFATE 75 MILLIGRAM(S): 75 TABLET, FILM COATED ORAL at 12:25

## 2018-10-22 RX ADMIN — Medication 1: at 08:23

## 2018-10-22 RX ADMIN — ENOXAPARIN SODIUM 40 MILLIGRAM(S): 100 INJECTION SUBCUTANEOUS at 12:25

## 2018-10-22 RX ADMIN — SODIUM CHLORIDE 50 MILLILITER(S): 9 INJECTION INTRAMUSCULAR; INTRAVENOUS; SUBCUTANEOUS at 08:24

## 2018-10-22 RX ADMIN — GABAPENTIN 100 MILLIGRAM(S): 400 CAPSULE ORAL at 14:32

## 2018-10-22 RX ADMIN — Medication 81 MILLIGRAM(S): at 12:25

## 2018-10-22 RX ADMIN — LISINOPRIL 20 MILLIGRAM(S): 2.5 TABLET ORAL at 06:48

## 2018-10-22 RX ADMIN — Medication 1 DROP(S): at 17:15

## 2018-10-22 RX ADMIN — Medication 25 MILLIGRAM(S): at 06:48

## 2018-10-22 RX ADMIN — Medication 1 DROP(S): at 06:49

## 2018-10-22 RX ADMIN — Medication 1: at 17:15

## 2018-10-22 RX ADMIN — Medication 650 MILLIGRAM(S): at 17:30

## 2018-10-22 NOTE — PROGRESS NOTE ADULT - ATTENDING COMMENTS
EKG -     Assessmrnt/Rec     - Syncope - Orthoststic  Hypotension- improving,on IV fluids, ,CHECK echo,  - CAD s/p PCI - cont asa plavix   - Bradycardia - Agree with stopping metoprolol.HTN-cont.lisinopril,decrease IVF,MONITOR.add.hydralazine 25 mg po twice per day  -HLD-Atorvostatin  -DM-FS with coverage  -Neuropathy, likely sec.to DM,cont. NEURONTIN  -increase dose-200 MG three times per day,increase as tolerated and needed.  -Hyponatremia-likely vol.depleted-IV fluids.Renal f/u noted.IMPROVED.  -abn u/a-f/u cultures-likely contamination  --DVT Prophylaxis  -PT eval.d/c planning,ECHO-Pending

## 2018-10-22 NOTE — CHART NOTE - NSCHARTNOTEFT_GEN_A_CORE
JESSICA rescheduled for tomm- Informed pts daughter Maria De Jesus- Will feed pt- Will keep NPO after MN for JESSICA tomm

## 2018-10-22 NOTE — PROGRESS NOTE ADULT - PROBLEM SELECTOR PLAN 1
Pt with hyponatremia likely sec to hypovolemic hyponatremia in setting of decreased PO intake  Pt given IVF with improvement in serum sodium  Continue IVF (currently on NS at 50cc/hr )  Urine studies suggestive of  ? SIADH however Na improving with NS  TSH WNL, Cortisol wnl  Monitor serum sodium.

## 2018-10-23 LAB
BUN SERPL-MCNC: 17 MG/DL — SIGNIFICANT CHANGE UP (ref 7–23)
CALCIUM SERPL-MCNC: 8.8 MG/DL — SIGNIFICANT CHANGE UP (ref 8.4–10.5)
CHLORIDE SERPL-SCNC: 98 MMOL/L — SIGNIFICANT CHANGE UP (ref 98–107)
CO2 SERPL-SCNC: 25 MMOL/L — SIGNIFICANT CHANGE UP (ref 22–31)
CREAT SERPL-MCNC: 0.63 MG/DL — SIGNIFICANT CHANGE UP (ref 0.5–1.3)
GLUCOSE SERPL-MCNC: 143 MG/DL — HIGH (ref 70–99)
HCT VFR BLD CALC: 36.8 % — SIGNIFICANT CHANGE UP (ref 34.5–45)
HGB BLD-MCNC: 11.9 G/DL — SIGNIFICANT CHANGE UP (ref 11.5–15.5)
MCHC RBC-ENTMCNC: 27.9 PG — SIGNIFICANT CHANGE UP (ref 27–34)
MCHC RBC-ENTMCNC: 32.3 % — SIGNIFICANT CHANGE UP (ref 32–36)
MCV RBC AUTO: 86.4 FL — SIGNIFICANT CHANGE UP (ref 80–100)
NRBC # FLD: 0 — SIGNIFICANT CHANGE UP
PLATELET # BLD AUTO: 217 K/UL — SIGNIFICANT CHANGE UP (ref 150–400)
PMV BLD: 11.1 FL — SIGNIFICANT CHANGE UP (ref 7–13)
POTASSIUM SERPL-MCNC: 3.5 MMOL/L — SIGNIFICANT CHANGE UP (ref 3.5–5.3)
POTASSIUM SERPL-SCNC: 3.5 MMOL/L — SIGNIFICANT CHANGE UP (ref 3.5–5.3)
RBC # BLD: 4.26 M/UL — SIGNIFICANT CHANGE UP (ref 3.8–5.2)
RBC # FLD: 15.5 % — HIGH (ref 10.3–14.5)
SODIUM SERPL-SCNC: 137 MMOL/L — SIGNIFICANT CHANGE UP (ref 135–145)
WBC # BLD: 6.95 K/UL — SIGNIFICANT CHANGE UP (ref 3.8–10.5)
WBC # FLD AUTO: 6.95 K/UL — SIGNIFICANT CHANGE UP (ref 3.8–10.5)

## 2018-10-23 PROCEDURE — 93320 DOPPLER ECHO COMPLETE: CPT | Mod: 26,GC

## 2018-10-23 PROCEDURE — 93325 DOPPLER ECHO COLOR FLOW MAPG: CPT | Mod: 26,GC

## 2018-10-23 PROCEDURE — 93312 ECHO TRANSESOPHAGEAL: CPT | Mod: 26

## 2018-10-23 PROCEDURE — 76376 3D RENDER W/INTRP POSTPROCES: CPT | Mod: 26

## 2018-10-23 RX ORDER — BENZOCAINE AND MENTHOL 5; 1 G/100ML; G/100ML
1 LIQUID ORAL ONCE
Qty: 0 | Refills: 0 | Status: COMPLETED | OUTPATIENT
Start: 2018-10-23 | End: 2018-10-23

## 2018-10-23 RX ORDER — AMLODIPINE BESYLATE 2.5 MG/1
5 TABLET ORAL DAILY
Qty: 0 | Refills: 0 | Status: DISCONTINUED | OUTPATIENT
Start: 2018-10-23 | End: 2018-10-25

## 2018-10-23 RX ADMIN — GABAPENTIN 300 MILLIGRAM(S): 400 CAPSULE ORAL at 18:51

## 2018-10-23 RX ADMIN — GABAPENTIN 300 MILLIGRAM(S): 400 CAPSULE ORAL at 06:05

## 2018-10-23 RX ADMIN — Medication 1 DROP(S): at 18:52

## 2018-10-23 RX ADMIN — Medication 1: at 21:40

## 2018-10-23 RX ADMIN — Medication 1: at 08:51

## 2018-10-23 RX ADMIN — LISINOPRIL 20 MILLIGRAM(S): 2.5 TABLET ORAL at 06:05

## 2018-10-23 RX ADMIN — MEMANTINE HYDROCHLORIDE 10 MILLIGRAM(S): 10 TABLET ORAL at 18:52

## 2018-10-23 RX ADMIN — ENOXAPARIN SODIUM 40 MILLIGRAM(S): 100 INJECTION SUBCUTANEOUS at 18:52

## 2018-10-23 RX ADMIN — MEMANTINE HYDROCHLORIDE 10 MILLIGRAM(S): 10 TABLET ORAL at 06:05

## 2018-10-23 RX ADMIN — Medication 1 DROP(S): at 06:06

## 2018-10-23 RX ADMIN — BENZOCAINE AND MENTHOL 1 LOZENGE: 5; 1 LIQUID ORAL at 21:20

## 2018-10-23 RX ADMIN — ATORVASTATIN CALCIUM 10 MILLIGRAM(S): 80 TABLET, FILM COATED ORAL at 21:19

## 2018-10-23 RX ADMIN — CLOPIDOGREL BISULFATE 75 MILLIGRAM(S): 75 TABLET, FILM COATED ORAL at 18:53

## 2018-10-23 RX ADMIN — Medication 81 MILLIGRAM(S): at 18:52

## 2018-10-23 NOTE — PROGRESS NOTE ADULT - PROBLEM SELECTOR PLAN 2
elevated BP, pending orthostatic vitals  hydralazine d/c, off BB sec to elda  on lisinopril 20mg daily, start norvasc 5mg daily   Monitor BP  Low salt diet.

## 2018-10-23 NOTE — PROGRESS NOTE ADULT - ATTENDING COMMENTS
EKG -     Assessmrnt/Rec     - Syncope - Orthoststic  Hypotension- improving,on IV fluids, ,CHECK echo-JESSICA-nl systolic function,severe AS,mild AI,Mild-mod Mitral regurgitation.Cardiology f/u   - CAD s/p PCI - cont asa plavix   - Bradycardia - Agree with stopping metoprolol.HTN-cont.lisinopril,decrease IVF,MONITOR.add.hydralazine 25 mg po twice per day  -HLD-Atorvostatin  -DM-FS with coverage  -Neuropathy, likely sec.to DM,cont. NEURONTIN  -increase dose-200 MG three times per day,increase as tolerated and needed.  -Hyponatremia-likely vol.depleted-IV fluids.Renal f/u noted.IMPROVED.  -abn u/a-f/u cultures-likely contamination  --DVT Prophylaxis  -PT eval.OOB

## 2018-10-24 RX ADMIN — GABAPENTIN 300 MILLIGRAM(S): 400 CAPSULE ORAL at 05:40

## 2018-10-24 RX ADMIN — CLOPIDOGREL BISULFATE 75 MILLIGRAM(S): 75 TABLET, FILM COATED ORAL at 11:38

## 2018-10-24 RX ADMIN — MEMANTINE HYDROCHLORIDE 10 MILLIGRAM(S): 10 TABLET ORAL at 17:00

## 2018-10-24 RX ADMIN — Medication 81 MILLIGRAM(S): at 11:38

## 2018-10-24 RX ADMIN — AMLODIPINE BESYLATE 5 MILLIGRAM(S): 2.5 TABLET ORAL at 05:40

## 2018-10-24 RX ADMIN — ATORVASTATIN CALCIUM 10 MILLIGRAM(S): 80 TABLET, FILM COATED ORAL at 21:14

## 2018-10-24 RX ADMIN — GABAPENTIN 300 MILLIGRAM(S): 400 CAPSULE ORAL at 17:00

## 2018-10-24 RX ADMIN — MEMANTINE HYDROCHLORIDE 10 MILLIGRAM(S): 10 TABLET ORAL at 05:41

## 2018-10-24 RX ADMIN — Medication 1 DROP(S): at 05:40

## 2018-10-24 RX ADMIN — Medication 1 DROP(S): at 17:01

## 2018-10-24 RX ADMIN — ENOXAPARIN SODIUM 40 MILLIGRAM(S): 100 INJECTION SUBCUTANEOUS at 11:38

## 2018-10-24 RX ADMIN — Medication 3: at 11:39

## 2018-10-24 RX ADMIN — LISINOPRIL 20 MILLIGRAM(S): 2.5 TABLET ORAL at 05:40

## 2018-10-24 RX ADMIN — Medication 4: at 17:01

## 2018-10-24 NOTE — PROGRESS NOTE ADULT - PROBLEM SELECTOR PLAN 1
Pt with hyponatremia likely sec to hypovolemic hyponatremia in setting of decreased PO intake  Pt given IVF with improvement in serum sodium  Continue IVF (currently on NS at 50cc/hr )  Urine studies suggestive of  ? SIADH however Na improving with NS  TSH WNL, Cortisol wnl  Monitor serum sodium. Pt with hyponatremia likely sec to hypovolemic hyponatremia in setting of decreased PO intake  Pt given IVF with improvement in serum sodium  Urine studies suggestive of  ? SIADH however Na improved with NS  TSH WNL, Cortisol wnl  Monitor serum sodium.

## 2018-10-24 NOTE — PROGRESS NOTE ADULT - ATTENDING COMMENTS
EKG -     Assessmrnt/Rec     - Syncope - Orthoststic  Hypotension- improving,on IV fluids, ,CHECK echo-JESSICA-nl systolic function,severe AS,mild AI,Mild-mod Mitral regurgitation.Cardiology f/u noted.outpt f/u with cardiology re AS.  - CAD s/p PCI - cont asa plavix   - Bradycardia - Agree with stopping metoprolol.HTN-cont.lisinopril,decrease IVF,MONITOR.add.hydralazine 25 mg po twice per day  -HLD-Atorvostatin  -DM-FS with coverage  -Neuropathy, likely sec.to DM,cont. NEURONTIN  -increase dose-200 MG three times per day,increase as tolerated and needed.  -Hyponatremia-likely vol.depleted-IV fluids.Renal f/u noted.IMPROVED.  -abn u/a-f/u cultures-likely contamination  --DVT Prophylaxis  -PT eval.OOB.STABLE FOR d/c

## 2018-10-24 NOTE — PROGRESS NOTE ADULT - PROBLEM SELECTOR PLAN 2
BP better, still orthostatic   hydralazine d/c, off BB sec to elda  on lisinopril 20mg daily, on norvasc 5mg daily   Monitor BP  Low salt diet.  f/u cardio, consider midodrine BP better, still orthostatic   hydralazine d/c, off BB sec to elda  on lisinopril 20mg daily, on norvasc 5mg daily   Monitor BP  Low salt diet.  f/u cardio,

## 2018-10-24 NOTE — CHART NOTE - NSCHARTNOTEFT_GEN_A_CORE
Follow-up visit for coping with hospital stay, standing at pt bedside. Pt A&Ox4, denies S/HI and A/VH. Speech rate, tone, and volume all WNL, and appropriate eye contact maintained throughout session. Pt denies depressive symptoms, and appeared cheerful, exhibiting logical thought process. Motivational interviewing provided to help pt tolerate hospital stay despite eagerness to return home. Pt reported not having any concerns regarding her hospital stay and is pleasant and cooperative.    Patient was seen by ASHLEY Hatfield, Psychology Extern under my supervision

## 2018-10-25 VITALS
SYSTOLIC BLOOD PRESSURE: 107 MMHG | OXYGEN SATURATION: 88 % | DIASTOLIC BLOOD PRESSURE: 58 MMHG | RESPIRATION RATE: 17 BRPM | HEART RATE: 73 BPM | TEMPERATURE: 98 F

## 2018-10-25 RX ORDER — AMLODIPINE BESYLATE 2.5 MG/1
1 TABLET ORAL
Qty: 20 | Refills: 0
Start: 2018-10-25 | End: 2018-11-13

## 2018-10-25 RX ORDER — METOPROLOL TARTRATE 50 MG
1 TABLET ORAL
Qty: 0 | Refills: 0 | COMMUNITY

## 2018-10-25 RX ORDER — ACETAMINOPHEN 500 MG
2 TABLET ORAL
Qty: 0 | Refills: 0 | DISCHARGE
Start: 2018-10-25

## 2018-10-25 RX ADMIN — AMLODIPINE BESYLATE 5 MILLIGRAM(S): 2.5 TABLET ORAL at 05:41

## 2018-10-25 RX ADMIN — MEMANTINE HYDROCHLORIDE 10 MILLIGRAM(S): 10 TABLET ORAL at 05:41

## 2018-10-25 RX ADMIN — ENOXAPARIN SODIUM 40 MILLIGRAM(S): 100 INJECTION SUBCUTANEOUS at 11:51

## 2018-10-25 RX ADMIN — Medication 1 DROP(S): at 05:41

## 2018-10-25 RX ADMIN — Medication 3: at 11:51

## 2018-10-25 RX ADMIN — GABAPENTIN 300 MILLIGRAM(S): 400 CAPSULE ORAL at 05:41

## 2018-10-25 RX ADMIN — LISINOPRIL 20 MILLIGRAM(S): 2.5 TABLET ORAL at 05:41

## 2018-10-25 RX ADMIN — INFLUENZA VIRUS VACCINE 0.5 MILLILITER(S): 15; 15; 15; 15 SUSPENSION INTRAMUSCULAR at 14:52

## 2018-10-25 RX ADMIN — Medication 1: at 07:50

## 2018-10-25 NOTE — DIETITIAN INITIAL EVALUATION ADULT. - PERTINENT MEDS FT
MEDICATIONS  (STANDING):  amLODIPine   Tablet 5 milliGRAM(s) Oral daily  aspirin enteric coated 81 milliGRAM(s) Oral daily  atorvastatin 10 milliGRAM(s) Oral at bedtime  clopidogrel Tablet 75 milliGRAM(s) Oral daily  dextrose 5%. 1000 milliLiter(s) (50 mL/Hr) IV Continuous <Continuous>  dextrose 50% Injectable 12.5 Gram(s) IV Push once  dextrose 50% Injectable 25 Gram(s) IV Push once  dextrose 50% Injectable 25 Gram(s) IV Push once  enoxaparin Injectable 40 milliGRAM(s) SubCutaneous every 24 hours  gabapentin 300 milliGRAM(s) Oral two times a day  influenza  Vaccine (HIGH DOSE) 0.5 milliLiter(s) IntraMuscular once  insulin lispro (HumaLOG) corrective regimen sliding scale   SubCutaneous three times a day before meals  insulin lispro (HumaLOG) corrective regimen sliding scale   SubCutaneous at bedtime  lisinopril 20 milliGRAM(s) Oral daily  memantine 10 milliGRAM(s) Oral two times a day  timolol 0.5% Solution 1 Drop(s) Both EYES every 12 hours

## 2018-10-25 NOTE — PROGRESS NOTE ADULT - PROBLEM SELECTOR PLAN 2
BP better, still orthostatic   hydralazine d/c, off BB sec to elda  on lisinopril 20mg daily, on norvasc 5mg daily   Monitor BP  Low salt diet.  f/u cardio,

## 2018-10-25 NOTE — PROGRESS NOTE ADULT - PROBLEM SELECTOR PROBLEM 3
Proteinuria
Hyponatremia
Proteinuria
Hyponatremia

## 2018-10-25 NOTE — PROGRESS NOTE ADULT - PROVIDER SPECIALTY LIST ADULT
Cardiology
Internal Medicine
Nephrology
Cardiology
Nephrology
Internal Medicine

## 2018-10-25 NOTE — PROGRESS NOTE ADULT - PROBLEM SELECTOR PLAN 1
Pt with hyponatremia likely sec to hypovolemic hyponatremia in setting of decreased PO intake  Pt given IVF with improvement in serum sodium  Urine studies suggestive of  ? SIADH however Na improved with NS  TSH WNL, Cortisol wnl  Monitor serum sodium.

## 2018-10-25 NOTE — PROGRESS NOTE ADULT - REASON FOR ADMISSION
Syncope

## 2018-10-25 NOTE — PROGRESS NOTE ADULT - PROBLEM SELECTOR PROBLEM 1
Hyponatremia
Near syncope
Hyponatremia
Near syncope
Near syncope

## 2018-10-25 NOTE — DIETITIAN INITIAL EVALUATION ADULT. - OTHER INFO
Initial Dietitian Evaluation 2/2 to extended length of stay. Pt is a 89 year old female with a  past medical history inclusive of  CAD, HTN, HLD, DM, dementia, who presents with generalized weakness in the setting of dehydration and FTT.  Per staff, Pt with good PO intake and appetite at this time.  No GI distress (nausea/vomiting/diarrhea/constipation.) No reported difficulties chewing and swallowing foods and fluids but patient requires assistance with meals.

## 2018-10-25 NOTE — PROGRESS NOTE ADULT - PROBLEM SELECTOR PROBLEM 2
HTN (hypertension)
Hypertensive urgency
HTN (hypertension)
Hypertensive urgency

## 2018-10-25 NOTE — PROGRESS NOTE ADULT - ASSESSMENT
EKG - Sinus Olivier     a/p     1) Syncope - orthoststic + on IV fluids, neuro consult appreciated, has a systolic murmur, will get 2d echo to r/o AS,    2) CAD s/p PCI - cont asa plavix     3) Bradycardia - will d/c metoprolol .     4) HTN: started on lisinopril 20 mg po daily
89 y/o female with a PMHx of CAD S/P stent placement, HTN, HLD, DM and dementia admitted for generalized weakness and near syncope in the setting of dehydration and FTT, found to be in hypertensive urgency.
89 y/o female with a PMHx of CAD S/P stent placement, HTN, HLD, DM and dementia admitted for generalized weakness and near syncope in the setting of dehydration and FTT, found to be in hypertensive urgency.
89 y/o female with a PMHx of CAD S/P stent placement, HTN, HLD, DM and dementia admitted for generalized weakness and near syncope in the setting of dehydration and FTT, found to be in hypertensive urgency.
EKG - Sinus Olivier     a/p     1) Syncope - orthoststic + on IV fluids, neuro consult appreciated, has a systolic murmur, will get 2d echo to r/o AS,    2) CAD s/p PCI - cont asa plavix     3) Bradycardia - will d/c metoprolol .     4) HTN: started on lisinopril 20 mg po daily
EKG - Sinus Olivier   < from: Transthoracic Echocardiogram (10.20.18 @ 09:04) >  1. Calcified trileaflet aortic valve with decreased  opening. Peak transaortic valve gradient equals 26 mm Hg,  estimated aortic valve area equals 0.7 sqcm (by continuity  equation), consistent with severe aortic stenosis. Minimal  aortic regurgitation.  2. Moderate concentric left ventricular hypertrophy.  3. Endocardium not well visualized; grossly preserved left  ventricular systolic function.  4. Mild diastolic dysfunction (Stage I).  5. Normal right ventricular size and function.  6. Estimated pulmonary artery systolic pressure equals 17  mm Hg, assuming right atrial pressure equals 10  mm Hg,  consistent with normal pulmonary pressures.    < end of copied text >    JESSICA:   < from: JESSICA w/o TTE (w/3D Echo) (10.23.18 @ 14:58) >  1. Mitral annular calcification, otherwise normal mitral  valve. Mild-moderate mitral regurgitation.  2. Calcified trileaflet aortic valve with decreased  opening. Peak transaortic valve gradient equals 43 mm Hg,  mean transaortic valve gradient equals 23 mm Hg, estimated  aortic valve area equals 0.5 sqcm (by continuity equation  and by planimetry), consistent with severe aortic stenosis.  Minimal aortic regurgitation.  3. Normal aortic root.  Mild non-mobile atherosclerotic  plaque in the aortic arch and descending thoracic aorta.  4. Normal left atrium.  No left atrial or left atrial  appendage thrombus.  5. Normal left ventricular systolic function. No segmental  wall motion abnormalities.  6. Normal right ventricular size and function.  7. Contrast injection demonstrates no evidence of a patent  foramen ovale.    < end of copied text >    a/p     1) Syncope - continues to be  orthoststic + on IV fluids, TTE shows Paradoxical Low flow low gradient sever AS, JESSICA Likely paradoxical low flow low gradient sever AS. discussed with daughter/son jovi (rosie/jaya) and Dr. Balderas (aware of JESSICA results) . Regarding no aortic valve work up now , she will f/u with Dr. Balderas as op, compression stockings at d/c , instructed regrading manoeuvres to avoid symptoms of orthostasis       2) CAD s/p PCI - cont asa plavix     3) Bradycardia - c/t hold metoprolol .     4) HTN: lisinopril 20 mg po daily
EKG - Sinus Olivier   < from: Transthoracic Echocardiogram (10.20.18 @ 09:04) >  1. Calcified trileaflet aortic valve with decreased  opening. Peak transaortic valve gradient equals 26 mm Hg,  estimated aortic valve area equals 0.7 sqcm (by continuity  equation), consistent with severe aortic stenosis. Minimal  aortic regurgitation.  2. Moderate concentric left ventricular hypertrophy.  3. Endocardium not well visualized; grossly preserved left  ventricular systolic function.  4. Mild diastolic dysfunction (Stage I).  5. Normal right ventricular size and function.  6. Estimated pulmonary artery systolic pressure equals 17  mm Hg, assuming right atrial pressure equals 10  mm Hg,  consistent with normal pulmonary pressures.    < end of copied text >    JESSICA:   < from: JESSICA w/o TTE (w/3D Echo) (10.23.18 @ 14:58) >  1. Mitral annular calcification, otherwise normal mitral  valve. Mild-moderate mitral regurgitation.  2. Calcified trileaflet aortic valve with decreased  opening. Peak transaortic valve gradient equals 43 mm Hg,  mean transaortic valve gradient equals 23 mm Hg, estimated  aortic valve area equals 0.5 sqcm (by continuity equation  and by planimetry), consistent with severe aortic stenosis.  Minimal aortic regurgitation.  3. Normal aortic root.  Mild non-mobile atherosclerotic  plaque in the aortic arch and descending thoracic aorta.  4. Normal left atrium.  No left atrial or left atrial  appendage thrombus.  5. Normal left ventricular systolic function. No segmental  wall motion abnormalities.  6. Normal right ventricular size and function.  7. Contrast injection demonstrates no evidence of a patent  foramen ovale.    < end of copied text >    a/p     1) Syncope - continues to be  orthoststic + on IV fluids, TTE shows Paradoxical Low flow low gradient sever AS, JESSICA Likely paradoxical low flow low gradient sever AS. discussed with daughter/son jovi (rosie/jaya) and Dr. Balderas (aware of JESSICA results) . Regarding no aortic valve work up now , she will f/u with Dr. Balderas as op, compression stockings at d/c , instructed regrading manoeuvres to avoid symptoms of orthostasis       2) CAD s/p PCI - cont asa plavix     3) Bradycardia - c/t hold metoprolol .     4) HTN: lisinopril 20 mg po daily
EKG - Sinus Olivier   < from: Transthoracic Echocardiogram (10.20.18 @ 09:04) >  1. Calcified trileaflet aortic valve with decreased  opening. Peak transaortic valve gradient equals 26 mm Hg,  estimated aortic valve area equals 0.7 sqcm (by continuity  equation), consistent with severe aortic stenosis. Minimal  aortic regurgitation.  2. Moderate concentric left ventricular hypertrophy.  3. Endocardium not well visualized; grossly preserved left  ventricular systolic function.  4. Mild diastolic dysfunction (Stage I).  5. Normal right ventricular size and function.  6. Estimated pulmonary artery systolic pressure equals 17  mm Hg, assuming right atrial pressure equals 10  mm Hg,  consistent with normal pulmonary pressures.    < end of copied text >    JESSICA:   < from: JESSICA w/o TTE (w/3D Echo) (10.23.18 @ 14:58) >  1. Mitral annular calcification, otherwise normal mitral  valve. Mild-moderate mitral regurgitation.  2. Calcified trileaflet aortic valve with decreased  opening. Peak transaortic valve gradient equals 43 mm Hg,  mean transaortic valve gradient equals 23 mm Hg, estimated  aortic valve area equals 0.5 sqcm (by continuity equation  and by planimetry), consistent with severe aortic stenosis.  Minimal aortic regurgitation.  3. Normal aortic root.  Mild non-mobile atherosclerotic  plaque in the aortic arch and descending thoracic aorta.  4. Normal left atrium.  No left atrial or left atrial  appendage thrombus.  5. Normal left ventricular systolic function. No segmental  wall motion abnormalities.  6. Normal right ventricular size and function.  7. Contrast injection demonstrates no evidence of a patent  foramen ovale.    < end of copied text >    a/p     1) Syncope - continues to be  orthoststic + on IV fluids, TTE shows Paradoxical Low flow low gradient sever AS, JESSICA confirms sever AS will discussed with daughter and Dr. Balderas. Regarding f/u     2) CAD s/p PCI - cont asa plavix     3) Bradycardia - c/t hold metoprolol .     4) HTN: lisinopril 20 mg po daily
EKG - Sinus Olivier   < from: Transthoracic Echocardiogram (10.20.18 @ 09:04) >  1. Calcified trileaflet aortic valve with decreased  opening. Peak transaortic valve gradient equals 26 mm Hg,  estimated aortic valve area equals 0.7 sqcm (by continuity  equation), consistent with severe aortic stenosis. Minimal  aortic regurgitation.  2. Moderate concentric left ventricular hypertrophy.  3. Endocardium not well visualized; grossly preserved left  ventricular systolic function.  4. Mild diastolic dysfunction (Stage I).  5. Normal right ventricular size and function.  6. Estimated pulmonary artery systolic pressure equals 17  mm Hg, assuming right atrial pressure equals 10  mm Hg,  consistent with normal pulmonary pressures.    < end of copied text >    a/p     1) Syncope - orthoststic + on IV fluids, echo shows Paradoxical Low flow low gradient sever AS , discussed with daughter and Dr. Balderas and agree with proceeding with JESSICA     2) CAD s/p PCI - cont asa plavix     3) Bradycardia - will d/c metoprolol .     4) HTN: lisinopril 20 mg po daily
EKG - Sinus Olivier   < from: Transthoracic Echocardiogram (10.20.18 @ 09:04) >  1. Calcified trileaflet aortic valve with decreased  opening. Peak transaortic valve gradient equals 26 mm Hg,  estimated aortic valve area equals 0.7 sqcm (by continuity  equation), consistent with severe aortic stenosis. Minimal  aortic regurgitation.  2. Moderate concentric left ventricular hypertrophy.  3. Endocardium not well visualized; grossly preserved left  ventricular systolic function.  4. Mild diastolic dysfunction (Stage I).  5. Normal right ventricular size and function.  6. Estimated pulmonary artery systolic pressure equals 17  mm Hg, assuming right atrial pressure equals 10  mm Hg,  consistent with normal pulmonary pressures.    < end of copied text >    a/p     1) Syncope - orthoststic + on IV fluids, echo shows Paradoxical Low flow low gradient sever AS , discussed with daughter and proceed with JESSICA on monday    2) CAD s/p PCI - cont asa plavix     3) Bradycardia - will d/c metoprolol .     4) HTN: lisinopril 20 mg po daily but continues to be orthostatic
EKG - Sinus Olivier   < from: Transthoracic Echocardiogram (10.20.18 @ 09:04) >  1. Calcified trileaflet aortic valve with decreased  opening. Peak transaortic valve gradient equals 26 mm Hg,  estimated aortic valve area equals 0.7 sqcm (by continuity  equation), consistent with severe aortic stenosis. Minimal  aortic regurgitation.  2. Moderate concentric left ventricular hypertrophy.  3. Endocardium not well visualized; grossly preserved left  ventricular systolic function.  4. Mild diastolic dysfunction (Stage I).  5. Normal right ventricular size and function.  6. Estimated pulmonary artery systolic pressure equals 17  mm Hg, assuming right atrial pressure equals 10  mm Hg,  consistent with normal pulmonary pressures.    < end of copied text >    a/p     1) Syncope - orthoststic + on IV fluids, echo shows Paradoxical Low flow low gradient sever AS , discussed with daughter and proceed with JESSICA on monday, npo at midnight    2) CAD s/p PCI - cont asa plavix     3) Bradycardia - will d/c metoprolol .     4) HTN: lisinopril 20 mg po daily
Patient seen and examined at bedside. No chest pain/sob    VITALS:  T(F): 98.4 (10-21-18 @ 05:50), Max: 98.4 (10-20-18 @ 19:23)  HR: 73 (10-21-18 @ 05:50)  BP: 110/60 (10-21-18 @ 05:50)  RR: 17 (10-21-18 @ 05:50)  SpO2: 98% (10-21-18 @ 05:50)  Wt(kg): --        PHYSICAL EXAM:  Constitutional: NAD  Neck: No JVD  Respiratory: CTAB, no wheezes, rales or rhonchi  Cardiovascular: S1, S2, RRR  Gastrointestinal: BS+, soft, NT/ND  Extremities: No peripheral edema    Hospital Medications:   MEDICATIONS  (STANDING):  aspirin enteric coated 81 milliGRAM(s) Oral daily  atorvastatin 10 milliGRAM(s) Oral at bedtime  clopidogrel Tablet 75 milliGRAM(s) Oral daily  dextrose 5%. 1000 milliLiter(s) (50 mL/Hr) IV Continuous <Continuous>  dextrose 50% Injectable 12.5 Gram(s) IV Push once  dextrose 50% Injectable 25 Gram(s) IV Push once  dextrose 50% Injectable 25 Gram(s) IV Push once  enoxaparin Injectable 40 milliGRAM(s) SubCutaneous every 24 hours  gabapentin 100 milliGRAM(s) Oral three times a day  hydrALAZINE 25 milliGRAM(s) Oral two times a day  influenza  Vaccine (HIGH DOSE) 0.5 milliLiter(s) IntraMuscular once  insulin lispro (HumaLOG) corrective regimen sliding scale   SubCutaneous three times a day before meals  insulin lispro (HumaLOG) corrective regimen sliding scale   SubCutaneous at bedtime  lisinopril 20 milliGRAM(s) Oral daily  memantine 10 milliGRAM(s) Oral two times a day  sodium chloride 0.9%. 1000 milliLiter(s) (75 mL/Hr) IV Continuous <Continuous>  sodium chloride 0.9%. 1000 milliLiter(s) (50 mL/Hr) IV Continuous <Continuous>  timolol 0.5% Solution 1 Drop(s) Both EYES every 12 hours      LABS:  10-20    136  |  97<L>  |  14  ----------------------------<  169<H>  3.9   |  23  |  0.61    Ca    9.1      20 Oct 2018 06:01      Creatinine Trend: 0.61 <--, 0.64 <--, 0.60 <--, 0.66 <--, 0.73 <--                              13.2   8.83  )-----------( 227      ( 20 Oct 2018 06:01 )             40.6     Urine Studies:  Sodium, Random Urine: 76 mmol/L (10-17 @ 17:10)  Osmolality, Random Urine: 491 mosmo/kg (10-17 @ 17:10)  Creatinine, Random Urine: 51.60 mg/dL (10-17 @ 17:10)  Protein, Random Urine: 14.1 mg/dL (10-17 @ 17:10)    Sodium, Random Urine: 76 mmol/L (10-17 @ 17:10)  Osmolality, Random Urine: 491 mosmo/kg (10-17 @ 17:10)  Creatinine, Random Urine: 51.60 mg/dL (10-17 @ 17:10)  Protein, Random Urine: 14.1 mg/dL (10-17 @ 17:10)    Urinalysis - [10-16-18 @ 16:00]      Color YELLOW / Appearance Lt TURBID / SG 1.020 / pH 6.0      Gluc 150 / Ketone SMALL  / Bili NEGATIVE / Urobili NORMAL       Blood NEGATIVE / Protein 10 / Leuk Est MODERATE / Nitrite NEGATIVE      RBC 6-10 / WBC 11-25 / Hyaline NEGATIVE / Gran  / Sq Epi MODERATE / Non Sq Epi  / Bacteria NEGATIVE    Urine Creatinine 51.60      [10-17-18 @ 17:10]  Urine Protein 14.1      [10-17-18 @ 17:10]  Urine Sodium 76      [10-17-18 @ 17:10]  Urine Osmolality 491      [10-17-18 @ 17:10]    HbA1c 7.8      [10-17-18 @ 05:40]  TSH 2.03      [10-18-18 @ 06:45]  Lipid: chol 140, TG 88, HDL 75, LDL 55      [10-17-18 @ 05:40]        RADIOLOGY & ADDITIONAL STUDIES:
89 y/o female with a PMHx of CAD S/P stent placement, HTN, HLD, DM and dementia presents to ED with generalized weakness and near syncope in the setting of dehydration and FTT, found to be in hypertensive urgency.
89 y/o female with a PMHx of CAD S/P stent placement, HTN, HLD, DM and dementia presents to ED with generalized weakness and near syncope in the setting of dehydration and FTT, found to be in hypertensive urgency.
89 y/o female with a PMHx of CAD S/P stent placement, HTN, HLD, DM and dementia presents to ED with generalized weakness and near syncope in the setting of dehydration and FTT, found to be in hypertensive urgency.
Patient seen and examined at bedside. No chest pain/sob    VITALS:  T(F): 98.4 (10-21-18 @ 05:50), Max: 98.4 (10-20-18 @ 19:23)  HR: 73 (10-21-18 @ 05:50)  BP: 110/60 (10-21-18 @ 05:50)  RR: 17 (10-21-18 @ 05:50)  SpO2: 98% (10-21-18 @ 05:50)  Wt(kg): --        PHYSICAL EXAM:  Constitutional: NAD  Neck: No JVD  Respiratory: CTAB, no wheezes, rales or rhonchi  Cardiovascular: S1, S2, RRR  Gastrointestinal: BS+, soft, NT/ND  Extremities: No peripheral edema    Hospital Medications:   MEDICATIONS  (STANDING):  aspirin enteric coated 81 milliGRAM(s) Oral daily  atorvastatin 10 milliGRAM(s) Oral at bedtime  clopidogrel Tablet 75 milliGRAM(s) Oral daily  dextrose 5%. 1000 milliLiter(s) (50 mL/Hr) IV Continuous <Continuous>  dextrose 50% Injectable 12.5 Gram(s) IV Push once  dextrose 50% Injectable 25 Gram(s) IV Push once  dextrose 50% Injectable 25 Gram(s) IV Push once  enoxaparin Injectable 40 milliGRAM(s) SubCutaneous every 24 hours  gabapentin 100 milliGRAM(s) Oral three times a day  hydrALAZINE 25 milliGRAM(s) Oral two times a day  influenza  Vaccine (HIGH DOSE) 0.5 milliLiter(s) IntraMuscular once  insulin lispro (HumaLOG) corrective regimen sliding scale   SubCutaneous three times a day before meals  insulin lispro (HumaLOG) corrective regimen sliding scale   SubCutaneous at bedtime  lisinopril 20 milliGRAM(s) Oral daily  memantine 10 milliGRAM(s) Oral two times a day  sodium chloride 0.9%. 1000 milliLiter(s) (75 mL/Hr) IV Continuous <Continuous>  sodium chloride 0.9%. 1000 milliLiter(s) (50 mL/Hr) IV Continuous <Continuous>  timolol 0.5% Solution 1 Drop(s) Both EYES every 12 hours      LABS:  10-20    136  |  97<L>  |  14  ----------------------------<  169<H>  3.9   |  23  |  0.61    Ca    9.1      20 Oct 2018 06:01      Creatinine Trend: 0.61 <--, 0.64 <--, 0.60 <--, 0.66 <--, 0.73 <--                              13.2   8.83  )-----------( 227      ( 20 Oct 2018 06:01 )             40.6     Urine Studies:  Sodium, Random Urine: 76 mmol/L (10-17 @ 17:10)  Osmolality, Random Urine: 491 mosmo/kg (10-17 @ 17:10)  Creatinine, Random Urine: 51.60 mg/dL (10-17 @ 17:10)  Protein, Random Urine: 14.1 mg/dL (10-17 @ 17:10)    Sodium, Random Urine: 76 mmol/L (10-17 @ 17:10)  Osmolality, Random Urine: 491 mosmo/kg (10-17 @ 17:10)  Creatinine, Random Urine: 51.60 mg/dL (10-17 @ 17:10)  Protein, Random Urine: 14.1 mg/dL (10-17 @ 17:10)    Urinalysis - [10-16-18 @ 16:00]      Color YELLOW / Appearance Lt TURBID / SG 1.020 / pH 6.0      Gluc 150 / Ketone SMALL  / Bili NEGATIVE / Urobili NORMAL       Blood NEGATIVE / Protein 10 / Leuk Est MODERATE / Nitrite NEGATIVE      RBC 6-10 / WBC 11-25 / Hyaline NEGATIVE / Gran  / Sq Epi MODERATE / Non Sq Epi  / Bacteria NEGATIVE    Urine Creatinine 51.60      [10-17-18 @ 17:10]  Urine Protein 14.1      [10-17-18 @ 17:10]  Urine Sodium 76      [10-17-18 @ 17:10]  Urine Osmolality 491      [10-17-18 @ 17:10]    HbA1c 7.8      [10-17-18 @ 05:40]  TSH 2.03      [10-18-18 @ 06:45]  Lipid: chol 140, TG 88, HDL 75, LDL 55      [10-17-18 @ 05:40]        RADIOLOGY & ADDITIONAL STUDIES:
89 y/o female with a PMHx of CAD S/P stent placement, HTN, HLD, DM and dementia presents to ED with generalized weakness and near syncope in the setting of dehydration and FTT, found to be in hypertensive urgency.
89 y/o female with a PMHx of CAD S/P stent placement, HTN, HLD, DM and dementia presents to ED with generalized weakness and near syncope in the setting of dehydration and FTT, found to be in hypertensive urgency.

## 2018-10-25 NOTE — PROGRESS NOTE ADULT - PROBLEM SELECTOR PLAN 3
Pt with proteinuria on UA  UA suggestive, of UTI however pt asymptomatic  Urine Tp/cr 0.2- not significant

## 2018-10-25 NOTE — DIETITIAN INITIAL EVALUATION ADULT. - PERTINENT LABORATORY DATA
10-23 Na137 mmol/L Glu 143 mg/dL<H> K+ 3.5 mmol/L Cr  0.63 mg/dL BUN 17 mg/dL 10-19 Phos 3.2 mg/dL 10-17 [id number] 7.8 %<H> 10-17 Chol 140 mg/dL LDL 55 mg/dL HDL 75 mg/dL<H> Trig 88 mg/dL

## 2018-10-25 NOTE — PROGRESS NOTE ADULT - SUBJECTIVE AND OBJECTIVE BOX
CARRI DRE  89y  Female      Patient is a 89y old  Female who presents with a chief complaint of Syncope (22 Oct 2018 12:31)  -comfortable,c/o burning sensation on feet b/l.no sob,no cp,no fever,no cough        INTERVAL HPI/OVERNIGHT EVENTS:  T(C): 36.3 (10-22-18 @ 20:47), Max: 36.8 (10-22-18 @ 06:43)  HR: 77 (10-22-18 @ 20:47) (77 - 80)  BP: 134/76 (10-22-18 @ 20:47) (133/69 - 173/84)  RR: 18 (10-22-18 @ 20:47) (18 - 18)  SpO2: 97% (10-22-18 @ 20:47) (97% - 100%)  Wt(kg): --  I&O's Summary    T(C): 36.3 (10-22-18 @ 20:47), Max: 36.8 (10-22-18 @ 06:43)  HR: 77 (10-22-18 @ 20:47) (77 - 80)  BP: 134/76 (10-22-18 @ 20:47) (133/69 - 173/84)  RR: 18 (10-22-18 @ 20:47) (18 - 18)  SpO2: 97% (10-22-18 @ 20:47) (97% - 100%)  Wt(kg): --Vital Signs Last 24 Hrs  T(C): 36.3 (22 Oct 2018 20:47), Max: 36.8 (22 Oct 2018 06:43)  T(F): 97.3 (22 Oct 2018 20:47), Max: 98.2 (22 Oct 2018 06:43)  HR: 77 (22 Oct 2018 20:47) (77 - 80)  BP: 134/76 (22 Oct 2018 20:47) (133/69 - 173/84)  BP(mean): --  RR: 18 (22 Oct 2018 20:47) (18 - 18)  SpO2: 97% (22 Oct 2018 20:47) (97% - 100%)    LABS:                        12.7   8.57  )-----------( 235      ( 22 Oct 2018 06:40 )             39.3     10-22    138  |  102  |  17  ----------------------------<  166<H>  4.1   |  24  |  0.71    Ca    9.1      22 Oct 2018 06:40          CAPILLARY BLOOD GLUCOSE      POCT Blood Glucose.: 185 mg/dL (22 Oct 2018 16:41)  POCT Blood Glucose.: 136 mg/dL (22 Oct 2018 11:53)  POCT Blood Glucose.: 155 mg/dL (22 Oct 2018 08:01)            PAST MEDICAL & SURGICAL HISTORY:  Dementia  CAD (coronary artery disease)  DM (diabetes mellitus)  HLD (hyperlipidemia)  HTN (hypertension)  S/P coronary artery stent placement      MEDICATIONS  (STANDING):  aspirin enteric coated 81 milliGRAM(s) Oral daily  atorvastatin 10 milliGRAM(s) Oral at bedtime  clopidogrel Tablet 75 milliGRAM(s) Oral daily  dextrose 5%. 1000 milliLiter(s) (50 mL/Hr) IV Continuous <Continuous>  dextrose 50% Injectable 12.5 Gram(s) IV Push once  dextrose 50% Injectable 25 Gram(s) IV Push once  dextrose 50% Injectable 25 Gram(s) IV Push once  enoxaparin Injectable 40 milliGRAM(s) SubCutaneous every 24 hours  gabapentin 100 milliGRAM(s) Oral three times a day  influenza  Vaccine (HIGH DOSE) 0.5 milliLiter(s) IntraMuscular once  insulin lispro (HumaLOG) corrective regimen sliding scale   SubCutaneous three times a day before meals  insulin lispro (HumaLOG) corrective regimen sliding scale   SubCutaneous at bedtime  lisinopril 20 milliGRAM(s) Oral daily  memantine 10 milliGRAM(s) Oral two times a day  timolol 0.5% Solution 1 Drop(s) Both EYES every 12 hours    MEDICATIONS  (PRN):  acetaminophen   Tablet .. 650 milliGRAM(s) Oral every 6 hours PRN Mild Pain (1 - 3), Moderate Pain (4 - 6)  dextrose 40% Gel 15 Gram(s) Oral once PRN Blood Glucose LESS THAN 70 milliGRAM(s)/deciliter  glucagon  Injectable 1 milliGRAM(s) IntraMuscular once PRN Glucose LESS THAN 70 milligrams/deciliter        RADIOLOGY & ADDITIONAL TESTS:    Imaging Personally Reviewed:  [ ] YES  [ ] NO    Consultant(s) Notes Reviewed:  [ ] YES  [ ] NO    PHYSICAL EXAM:  GENERAL: NAD, well-groomed, well-developed  HEAD:  Atraumatic, Normocephalic  EYES: EOMI, PERRLA, conjunctiva and sclera clear  ENMT: No tonsillar erythema, exudates, or enlargement; Moist mucous membranes, Good dentition, No lesions  NECK: Supple, No JVD, Normal thyroid  NERVOUS SYSTEM:  Alert & Oriented X3, Good concentration; Motor Strength 5/5 B/L upper and lower extremities; DTRs 2+ intact and symmetric  CHEST/LUNG: Clear to percussion bilaterally; No rales, rhonchi, wheezing, or rubs  HEART: Regular rate and rhythm; No murmurs, rubs, or gallops  ABDOMEN: Soft, Nontender, Nondistended; Bowel sounds present  EXTREMITIES:  2+ Peripheral Pulses, No clubbing, cyanosis, or edema  LYMPH: No lymphadenopathy noted  SKIN: No rashes or lesions    Care Discussed with Consultants/Other Providers [ ] YES  [ ] NO      Code Status: [] Full Code [] DNR [] DNI [] Goals of Care:   Disposition: [] ICU [] Stroke Unit [] RCU []PCU []Floor [] Discharge Home         AMANDA VanFACP
CARRI DRE  89y  Female      Patient is a 89y old  Female who presents with a chief complaint of Syncope (23 Oct 2018 11:36)  Patient is comfortable,no cp,no sob,no fever,no cough,leg pains better    HR: 79 (10-23-18 @ 19:42) (74 - 79)  BP: 165/80 (10-23-18 @ 19:42) (142/88 - 177/87)  RR: 18 (10-23-18 @ 19:42) (18 - 18)  SpO2: 98% (10-23-18 @ 19:42) (97% - 100%)  Wt(kg): --  I&O's Summary    T(C): 36.9 (10-23-18 @ 19:42), Max: 36.9 (10-23-18 @ 19:42)  HR: 79 (10-23-18 @ 19:42) (74 - 79)  BP: 165/80 (10-23-18 @ 19:42) (142/88 - 177/87)  RR: 18 (10-23-18 @ 19:42) (18 - 18)  SpO2: 98% (10-23-18 @ 19:42) (97% - 100%)  Wt(kg): --Vital Signs Last 24 Hrs  T(C): 36.9 (23 Oct 2018 19:42), Max: 36.9 (23 Oct 2018 19:42)  T(F): 98.4 (23 Oct 2018 19:42), Max: 98.4 (23 Oct 2018 19:42)  HR: 79 (23 Oct 2018 19:42) (74 - 79)  BP: 165/80 (23 Oct 2018 19:42) (142/88 - 177/87)  BP(mean): --  RR: 18 (23 Oct 2018 19:42) (18 - 18)  SpO2: 98% (23 Oct 2018 19:42) (97% - 100%)    LABS:                        11.9   6.95  )-----------( 217      ( 23 Oct 2018 05:20 )             36.8     10-23    137  |  98  |  17  ----------------------------<  143<H>  3.5   |  25  |  0.63    Ca    8.8      23 Oct 2018 05:20          CAPILLARY BLOOD GLUCOSE      POCT Blood Glucose.: 253 mg/dL (23 Oct 2018 21:35)  POCT Blood Glucose.: 134 mg/dL (23 Oct 2018 19:35)  POCT Blood Glucose.: 139 mg/dL (23 Oct 2018 11:51)  POCT Blood Glucose.: 182 mg/dL (23 Oct 2018 08:42)  POCT Blood Glucose.: 232 mg/dL (22 Oct 2018 21:52)            PAST MEDICAL & SURGICAL HISTORY:  Dementia  CAD (coronary artery disease)  DM (diabetes mellitus)  HLD (hyperlipidemia)  HTN (hypertension)  S/P coronary artery stent placement      MEDICATIONS  (STANDING):  amLODIPine   Tablet 5 milliGRAM(s) Oral daily  aspirin enteric coated 81 milliGRAM(s) Oral daily  atorvastatin 10 milliGRAM(s) Oral at bedtime  clopidogrel Tablet 75 milliGRAM(s) Oral daily  dextrose 5%. 1000 milliLiter(s) (50 mL/Hr) IV Continuous <Continuous>  dextrose 50% Injectable 12.5 Gram(s) IV Push once  dextrose 50% Injectable 25 Gram(s) IV Push once  dextrose 50% Injectable 25 Gram(s) IV Push once  enoxaparin Injectable 40 milliGRAM(s) SubCutaneous every 24 hours  gabapentin 300 milliGRAM(s) Oral two times a day  influenza  Vaccine (HIGH DOSE) 0.5 milliLiter(s) IntraMuscular once  insulin lispro (HumaLOG) corrective regimen sliding scale   SubCutaneous three times a day before meals  insulin lispro (HumaLOG) corrective regimen sliding scale   SubCutaneous at bedtime  lisinopril 20 milliGRAM(s) Oral daily  memantine 10 milliGRAM(s) Oral two times a day  timolol 0.5% Solution 1 Drop(s) Both EYES every 12 hours    MEDICATIONS  (PRN):  acetaminophen   Tablet .. 650 milliGRAM(s) Oral every 6 hours PRN Mild Pain (1 - 3), Moderate Pain (4 - 6)  dextrose 40% Gel 15 Gram(s) Oral once PRN Blood Glucose LESS THAN 70 milliGRAM(s)/deciliter  glucagon  Injectable 1 milliGRAM(s) IntraMuscular once PRN Glucose LESS THAN 70 milligrams/deciliter        RADIOLOGY & ADDITIONAL TESTS:    Imaging Personally Reviewed:  [ ] YES  [ ] NO    Consultant(s) Notes Reviewed:  [x ] YES  [ ] NO    PHYSICAL EXAM:  GENERAL: NAD, well-groomed, well-developed  HEAD:  Atraumatic, Normocephalic  EYES: EOMI, PERRLA, conjunctiva and sclera clear  ENMT: No tonsillar erythema, exudates, or enlargement; Moist mucous membranes, Good dentition, No lesions  NECK: Supple, No JVD, Normal thyroid  NERVOUS SYSTEM:  Alert & Oriented X3, Good concentration; Motor Strength 5/5 B/L upper and lower extremities; DTRs 2+ intact and symmetric  CHEST/LUNG: Clear to percussion bilaterally; No rales, rhonchi, wheezing, or rubs  HEART: Regular rate and rhythm; positive murmur murmurs, rubs, or gallops  ABDOMEN: Soft, Nontender, Nondistended; Bowel sounds present  EXTREMITIES:  2+ Peripheral Pulses, No clubbing, cyanosis, or edema  LYMPH: No lymphadenopathy noted  SKIN: No rashes or lesions    Care Discussed with Consultants/Other Providers [x ] YES  [ ] NO      Code Status: [] Full Code [] DNR [] DNI [] Goals of Care:   Disposition: [] ICU [] Stroke Unit [] RCU []PCU []Floor [] Discharge Home         AMANDA VanFACP
Jimbo Trevino MD  Interventional Cardiology  [place] Office : [address] [place] [address]  Tel:   [place] Office : [address] [facility] [place] [address]  Tel: [phone]  Cell : [phone]    Subjective : Pt lying in bed comfortable, not in distress, denies any chest pain or SOB  	  MEDICATIONS:  aspirin enteric coated 81 milliGRAM(s) Oral daily  clopidogrel Tablet 75 milliGRAM(s) Oral daily  enoxaparin Injectable 40 milliGRAM(s) SubCutaneous every 24 hours  lisinopril 20 milliGRAM(s) Oral daily        acetaminophen   Tablet .. 650 milliGRAM(s) Oral every 6 hours PRN  gabapentin 100 milliGRAM(s) Oral three times a day  memantine 10 milliGRAM(s) Oral two times a day      atorvastatin 10 milliGRAM(s) Oral at bedtime  dextrose 40% Gel 15 Gram(s) Oral once PRN  dextrose 50% Injectable 12.5 Gram(s) IV Push once  dextrose 50% Injectable 25 Gram(s) IV Push once  dextrose 50% Injectable 25 Gram(s) IV Push once  glucagon  Injectable 1 milliGRAM(s) IntraMuscular once PRN  insulin lispro (HumaLOG) corrective regimen sliding scale   SubCutaneous three times a day before meals  insulin lispro (HumaLOG) corrective regimen sliding scale   SubCutaneous at bedtime    dextrose 5%. 1000 milliLiter(s) IV Continuous <Continuous>  influenza   Vaccine 0.5 milliLiter(s) IntraMuscular once  sodium chloride 0.9%. 1000 milliLiter(s) IV Continuous <Continuous>  sodium chloride 0.9%. 1000 milliLiter(s) IV Continuous <Continuous>  timolol 0.5% Solution 1 Drop(s) Both EYES every 12 hours      PHYSICAL EXAM:  T(C): 36.2 (10-19-18 @ 19:06), Max: 36.6 (10-18-18 @ 21:55)  HR: 67 (10-19-18 @ 19:06) (58 - 71)  BP: 151/89 (10-19-18 @ 14:51) (137/87 - 182/92)  RR: 18 (10-19-18 @ 19:06) (17 - 18)  SpO2: 99% (10-19-18 @ 19:06) (98% - 100%)  Wt(kg): --  I&O's Summary        Appearance: Normal	  HEENT:   Normal oral mucosa, PERRL, EOMI	  Cardiovascular: Normal S1 S2, No JVD, No murmurs, No edema  Respiratory: Lungs clear to auscultation	  Gastrointestinal:  Soft, Non-tender, + BS	  Extremities: Normal range of motion, No clubbing, cyanosis or edema                                    14.1   11.55 )-----------( 240      ( 19 Oct 2018 06:50 )             43.5     10-19    136  |  98  |  16  ----------------------------<  159<H>  4.2   |  26  |  0.64    Ca    9.4      19 Oct 2018 06:50  Phos  3.2     10-19  Mg     1.7     10-19      proBNP:   Lipid Profile:   HgA1c:   TSH:
COOPERABDIFATAH DRE  89y  Female      Patient is a 89y old  Female who presents with a chief complaint of Syncope (20 Oct 2018 13:37)  Patient is comfortable,nad,no sob,no cp,no fever,no cough,eating          INTERVAL HPI/OVERNIGHT EVENTS:  T(C): 36.8 (10-20-18 @ 17:02), Max: 36.8 (10-20-18 @ 17:02)  HR: 79 (10-20-18 @ 17:02) (63 - 79)  BP: 180/89 (10-20-18 @ 17:02) (146/79 - 192/99)  RR: 18 (10-20-18 @ 17:02) (17 - 18)  SpO2: 98% (10-20-18 @ 17:02) (98% - 99%)  Wt(kg): --  I&O's Summary    T(C): 36.8 (10-20-18 @ 17:02), Max: 36.8 (10-20-18 @ 17:02)  HR: 79 (10-20-18 @ 17:02) (63 - 79)  BP: 180/89 (10-20-18 @ 17:02) (146/79 - 192/99)  RR: 18 (10-20-18 @ 17:02) (17 - 18)  SpO2: 98% (10-20-18 @ 17:02) (98% - 99%)  Wt(kg): --Vital Signs Last 24 Hrs  T(C): 36.8 (20 Oct 2018 17:02), Max: 36.8 (20 Oct 2018 17:02)  T(F): 98.2 (20 Oct 2018 17:02), Max: 98.2 (20 Oct 2018 17:02)  HR: 79 (20 Oct 2018 17:02) (63 - 79)  BP: 180/89 (20 Oct 2018 17:02) (146/79 - 192/99)  BP(mean): --  RR: 18 (20 Oct 2018 17:02) (17 - 18)  SpO2: 98% (20 Oct 2018 17:02) (98% - 99%)    LABS:                        13.2   8.83  )-----------( 227      ( 20 Oct 2018 06:01 )             40.6     10-20    136  |  97<L>  |  14  ----------------------------<  169<H>  3.9   |  23  |  0.61    Ca    9.1      20 Oct 2018 06:01  Phos  3.2     10-19  Mg     1.7     10-19          CAPILLARY BLOOD GLUCOSE      POCT Blood Glucose.: 195 mg/dL (20 Oct 2018 16:25)  POCT Blood Glucose.: 185 mg/dL (20 Oct 2018 11:35)  POCT Blood Glucose.: 156 mg/dL (20 Oct 2018 07:45)  POCT Blood Glucose.: 146 mg/dL (19 Oct 2018 21:36)            PAST MEDICAL & SURGICAL HISTORY:  Dementia  CAD (coronary artery disease)  DM (diabetes mellitus)  HLD (hyperlipidemia)  HTN (hypertension)  S/P coronary artery stent placement      MEDICATIONS  (STANDING):  aspirin enteric coated 81 milliGRAM(s) Oral daily  atorvastatin 10 milliGRAM(s) Oral at bedtime  clopidogrel Tablet 75 milliGRAM(s) Oral daily  dextrose 5%. 1000 milliLiter(s) (50 mL/Hr) IV Continuous <Continuous>  dextrose 50% Injectable 12.5 Gram(s) IV Push once  dextrose 50% Injectable 25 Gram(s) IV Push once  dextrose 50% Injectable 25 Gram(s) IV Push once  enoxaparin Injectable 40 milliGRAM(s) SubCutaneous every 24 hours  gabapentin 100 milliGRAM(s) Oral three times a day  influenza  Vaccine (HIGH DOSE) 0.5 milliLiter(s) IntraMuscular once  insulin lispro (HumaLOG) corrective regimen sliding scale   SubCutaneous three times a day before meals  insulin lispro (HumaLOG) corrective regimen sliding scale   SubCutaneous at bedtime  lisinopril 20 milliGRAM(s) Oral daily  memantine 10 milliGRAM(s) Oral two times a day  sodium chloride 0.9%. 1000 milliLiter(s) (75 mL/Hr) IV Continuous <Continuous>  sodium chloride 0.9%. 1000 milliLiter(s) (75 mL/Hr) IV Continuous <Continuous>  timolol 0.5% Solution 1 Drop(s) Both EYES every 12 hours    MEDICATIONS  (PRN):  acetaminophen   Tablet .. 650 milliGRAM(s) Oral every 6 hours PRN Mild Pain (1 - 3), Moderate Pain (4 - 6)  dextrose 40% Gel 15 Gram(s) Oral once PRN Blood Glucose LESS THAN 70 milliGRAM(s)/deciliter  glucagon  Injectable 1 milliGRAM(s) IntraMuscular once PRN Glucose LESS THAN 70 milligrams/deciliter        RADIOLOGY & ADDITIONAL TESTS:    Imaging Personally Reviewed:  [ ] YES  [ ] NO    Consultant(s) Notes Reviewed:  [ ] YES  [ ] NO    PHYSICAL EXAM:  GENERAL: NAD, well-groomed, well-developed  HEAD:  Atraumatic, Normocephalic  EYES: EOMI, PERRLA, conjunctiva and sclera clear  ENMT: No tonsillar erythema, exudates, or enlargement; Moist mucous membranes, Good dentition, No lesions  NECK: Supple, No JVD, Normal thyroid  NERVOUS SYSTEM:  Alert & Oriented X3, Good concentration; Motor Strength 5/5 B/L upper and lower extremities; DTRs 2+ intact and symmetric  CHEST/LUNG: Clear to percussion bilaterally; No rales, rhonchi, wheezing, or rubs  HEART: Regular rate and rhythm; No murmurs, rubs, or gallops  ABDOMEN: Soft, Nontender, Nondistended; Bowel sounds present  EXTREMITIES:  2+ Peripheral Pulses, No clubbing, cyanosis, or edema  LYMPH: No lymphadenopathy noted  SKIN: No rashes or lesions    Care Discussed with Consultants/Other Providers [ ] YES  [ ] NO      Code Status: [] Full Code [] DNR [] DNI [] Goals of Care:   Disposition: [] ICU [] Stroke Unit [] RCU []PCU []Floor [] Discharge Home         AMANDA Van[facility] [place] [facility]   MD DENVER DUBOSE MD ANGELA WONG, PA    TEL:  OFFICE: [phone]  DR CRISTINA CELL: [phone]  DR. MAYES CELL: [phone]  LORENA RUBIN CELL: [phone]        Patient is a 89y old  Female who presents with a chief complaint of Syncope (21 Oct 2018 21:11)      Patient seen and examined at bedside. No chest pain/sob    VITALS:  T(F): 98.2 (10-22-18 @ 06:43), Max: 98.2 (10-21-18 @ 17:51)  HR: 78 (10-22-18 @ 06:43)  BP: 173/84 (10-22-18 @ 06:43)  RR: 18 (10-22-18 @ 06:43)  SpO2: 99% (10-22-18 @ 06:43)  Wt(kg): --        PHYSICAL EXAM:  Constitutional: NAD  Neck: No JVD  Respiratory: CTAB, no wheezes, rales or rhonchi  Cardiovascular: S1, S2, RRR  Gastrointestinal: BS+, soft, NT/ND  Extremities: No peripheral edema    Hospital Medications:   MEDICATIONS  (STANDING):  aspirin enteric coated 81 milliGRAM(s) Oral daily  atorvastatin 10 milliGRAM(s) Oral at bedtime  clopidogrel Tablet 75 milliGRAM(s) Oral daily  dextrose 5%. 1000 milliLiter(s) (50 mL/Hr) IV Continuous <Continuous>  dextrose 50% Injectable 12.5 Gram(s) IV Push once  dextrose 50% Injectable 25 Gram(s) IV Push once  dextrose 50% Injectable 25 Gram(s) IV Push once  enoxaparin Injectable 40 milliGRAM(s) SubCutaneous every 24 hours  gabapentin 100 milliGRAM(s) Oral three times a day  hydrALAZINE 25 milliGRAM(s) Oral two times a day  influenza  Vaccine (HIGH DOSE) 0.5 milliLiter(s) IntraMuscular once  insulin lispro (HumaLOG) corrective regimen sliding scale   SubCutaneous three times a day before meals  insulin lispro (HumaLOG) corrective regimen sliding scale   SubCutaneous at bedtime  lisinopril 20 milliGRAM(s) Oral daily  memantine 10 milliGRAM(s) Oral two times a day  sodium chloride 0.9%. 1000 milliLiter(s) (50 mL/Hr) IV Continuous <Continuous>  timolol 0.5% Solution 1 Drop(s) Both EYES every 12 hours      LABS:  10-22    138  |  102  |  17  ----------------------------<  166<H>  4.1   |  24  |  0.71    Ca    9.1      22 Oct 2018 06:40      Creatinine Trend: 0.71 <--, 0.61 <--, 0.64 <--, 0.60 <--, 0.66 <--, 0.73 <--                                12.7   8.57  )-----------( 235      ( 22 Oct 2018 06:40 )             39.3     Urine Studies:  Urinalysis - [10-16-18 @ 16:00]      Color YELLOW / Appearance Lt TURBID / SG 1.020 / pH 6.0      Gluc 150 / Ketone SMALL  / Bili NEGATIVE / Urobili NORMAL       Blood NEGATIVE / Protein 10 / Leuk Est MODERATE / Nitrite NEGATIVE      RBC 6-10 / WBC 11-25 / Hyaline NEGATIVE / Gran  / Sq Epi MODERATE / Non Sq Epi  / Bacteria NEGATIVE    Urine Creatinine 51.60      [10-17-18 @ 17:10]  Urine Protein 14.1      [10-17-18 @ 17:10]  Urine Sodium 76      [10-17-18 @ 17:10]  Urine Osmolality 491      [10-17-18 @ 17:10]    HbA1c 7.8      [10-17-18 @ 05:40]  TSH 2.03      [10-18-18 @ 06:45]  Lipid: chol 140, TG 88, HDL 75, LDL 55      [10-17-18 @ 05:40]        RADIOLOGY & ADDITIONAL STUDIES:
[facility] [place] [facility] [place] [facility]   MD DENVER DUBOSE MD ANGELA WONG, PA    TEL:  OFFICE: [phone]  DR CRISTINA CELL: [phone]  DR. MAYES CELL: [phone]  LORENA RUBIN CELL: [phone]        Patient is a 89y old  Female who presents with a chief complaint of Syncope (22 Oct 2018 21:06)      Patient seen and examined at bedside. No chest pain/sob    VITALS:  T(F): 97.7 (10-23-18 @ 06:41), Max: 97.7 (10-23-18 @ 06:41)  HR: 74 (10-23-18 @ 06:41)  BP: 177/87 (10-23-18 @ 06:41)  RR: 18 (10-23-18 @ 06:41)  SpO2: 100% (10-23-18 @ 06:41)  Wt(kg): --        PHYSICAL EXAM:  Constitutional: NAD  Neck: No JVD  Respiratory: CTAB, no wheezes, rales or rhonchi  Cardiovascular: S1, S2, RRR  Gastrointestinal: BS+, soft, NT/ND  Extremities: No peripheral edema    Hospital Medications:   MEDICATIONS  (STANDING):  aspirin enteric coated 81 milliGRAM(s) Oral daily  atorvastatin 10 milliGRAM(s) Oral at bedtime  clopidogrel Tablet 75 milliGRAM(s) Oral daily  dextrose 5%. 1000 milliLiter(s) (50 mL/Hr) IV Continuous <Continuous>  dextrose 50% Injectable 12.5 Gram(s) IV Push once  dextrose 50% Injectable 25 Gram(s) IV Push once  dextrose 50% Injectable 25 Gram(s) IV Push once  enoxaparin Injectable 40 milliGRAM(s) SubCutaneous every 24 hours  gabapentin 300 milliGRAM(s) Oral two times a day  influenza  Vaccine (HIGH DOSE) 0.5 milliLiter(s) IntraMuscular once  insulin lispro (HumaLOG) corrective regimen sliding scale   SubCutaneous three times a day before meals  insulin lispro (HumaLOG) corrective regimen sliding scale   SubCutaneous at bedtime  lisinopril 20 milliGRAM(s) Oral daily  memantine 10 milliGRAM(s) Oral two times a day  timolol 0.5% Solution 1 Drop(s) Both EYES every 12 hours      LABS:  10-23    137  |  98  |  17  ----------------------------<  143<H>  3.5   |  25  |  0.63    Ca    8.8      23 Oct 2018 05:20      Creatinine Trend: 0.63 <--, 0.71 <--, 0.61 <--, 0.64 <--, 0.60 <--, 0.66 <--, 0.73 <--                                11.9   6.95  )-----------( 217      ( 23 Oct 2018 05:20 )             36.8     Urine Studies:  Urinalysis - [10-16-18 @ 16:00]      Color YELLOW / Appearance Lt TURBID / SG 1.020 / pH 6.0      Gluc 150 / Ketone SMALL  / Bili NEGATIVE / Urobili NORMAL       Blood NEGATIVE / Protein 10 / Leuk Est MODERATE / Nitrite NEGATIVE      RBC 6-10 / WBC 11-25 / Hyaline NEGATIVE / Gran  / Sq Epi MODERATE / Non Sq Epi  / Bacteria NEGATIVE    Urine Creatinine 51.60      [10-17-18 @ 17:10]  Urine Protein 14.1      [10-17-18 @ 17:10]  Urine Sodium 76      [10-17-18 @ 17:10]  Urine Osmolality 491      [10-17-18 @ 17:10]    HbA1c 7.8      [10-17-18 @ 05:40]  TSH 2.03      [10-18-18 @ 06:45]  Lipid: chol 140, TG 88, HDL 75, LDL 55      [10-17-18 @ 05:40]        RADIOLOGY & ADDITIONAL STUDIES:
DRE CHRISTINA  89y  Female      Patient is a 89y old  Female who presents with a chief complaint of Syncope (21 Oct 2018 17:33)  bp improved.leg pains better.no sob,no cp,eating          INTERVAL HPI/OVERNIGHT EVENTS:  T(C): 36.8 (10-21-18 @ 20:46), Max: 36.9 (10-21-18 @ 05:50)  HR: 71 (10-21-18 @ 20:46) (71 - 93)  BP: 140/90 (10-21-18 @ 20:46) (110/60 - 160/82)  RR: 17 (10-21-18 @ 20:46) (17 - 18)  SpO2: 97% (10-21-18 @ 20:46) (97% - 100%)  Wt(kg): --  I&O's Summary    20 Oct 2018 07:01  -  21 Oct 2018 07:00  --------------------------------------------------------  IN: 700 mL / OUT: 0 mL / NET: 700 mL      T(C): 36.8 (10-21-18 @ 20:46), Max: 36.9 (10-21-18 @ 05:50)  HR: 71 (10-21-18 @ 20:46) (71 - 93)  BP: 140/90 (10-21-18 @ 20:46) (110/60 - 160/82)  RR: 17 (10-21-18 @ 20:46) (17 - 18)  SpO2: 97% (10-21-18 @ 20:46) (97% - 100%)  Wt(kg): --Vital Signs Last 24 Hrs  T(C): 36.8 (21 Oct 2018 20:46), Max: 36.9 (21 Oct 2018 05:50)  T(F): 98.2 (21 Oct 2018 20:46), Max: 98.4 (21 Oct 2018 05:50)  HR: 71 (21 Oct 2018 20:46) (71 - 93)  BP: 140/90 (21 Oct 2018 20:46) (110/60 - 160/82)  BP(mean): --  RR: 17 (21 Oct 2018 20:46) (17 - 18)  SpO2: 97% (21 Oct 2018 20:46) (97% - 100%)    LABS:                        13.2   8.83  )-----------( 227      ( 20 Oct 2018 06:01 )             40.6     10-20    136  |  97<L>  |  14  ----------------------------<  169<H>  3.9   |  23  |  0.61    Ca    9.1      20 Oct 2018 06:01          CAPILLARY BLOOD GLUCOSE      POCT Blood Glucose.: 185 mg/dL (21 Oct 2018 21:04)  POCT Blood Glucose.: 156 mg/dL (21 Oct 2018 16:46)  POCT Blood Glucose.: 143 mg/dL (21 Oct 2018 11:43)  POCT Blood Glucose.: 179 mg/dL (21 Oct 2018 07:50)            PAST MEDICAL & SURGICAL HISTORY:  Dementia  CAD (coronary artery disease)  DM (diabetes mellitus)  HLD (hyperlipidemia)  HTN (hypertension)  S/P coronary artery stent placement      MEDICATIONS  (STANDING):  aspirin enteric coated 81 milliGRAM(s) Oral daily  atorvastatin 10 milliGRAM(s) Oral at bedtime  clopidogrel Tablet 75 milliGRAM(s) Oral daily  dextrose 5%. 1000 milliLiter(s) (50 mL/Hr) IV Continuous <Continuous>  dextrose 50% Injectable 12.5 Gram(s) IV Push once  dextrose 50% Injectable 25 Gram(s) IV Push once  dextrose 50% Injectable 25 Gram(s) IV Push once  enoxaparin Injectable 40 milliGRAM(s) SubCutaneous every 24 hours  gabapentin 100 milliGRAM(s) Oral three times a day  hydrALAZINE 25 milliGRAM(s) Oral two times a day  influenza  Vaccine (HIGH DOSE) 0.5 milliLiter(s) IntraMuscular once  insulin lispro (HumaLOG) corrective regimen sliding scale   SubCutaneous three times a day before meals  insulin lispro (HumaLOG) corrective regimen sliding scale   SubCutaneous at bedtime  lisinopril 20 milliGRAM(s) Oral daily  memantine 10 milliGRAM(s) Oral two times a day  sodium chloride 0.9%. 1000 milliLiter(s) (75 mL/Hr) IV Continuous <Continuous>  sodium chloride 0.9%. 1000 milliLiter(s) (50 mL/Hr) IV Continuous <Continuous>  timolol 0.5% Solution 1 Drop(s) Both EYES every 12 hours    MEDICATIONS  (PRN):  acetaminophen   Tablet .. 650 milliGRAM(s) Oral every 6 hours PRN Mild Pain (1 - 3), Moderate Pain (4 - 6)  dextrose 40% Gel 15 Gram(s) Oral once PRN Blood Glucose LESS THAN 70 milliGRAM(s)/deciliter  glucagon  Injectable 1 milliGRAM(s) IntraMuscular once PRN Glucose LESS THAN 70 milligrams/deciliter        RADIOLOGY & ADDITIONAL TESTS:    Imaging Personally Reviewed:  [ ] YES  [ ] NO    Consultant(s) Notes Reviewed:  [ ] YES  [ ] NO    PHYSICAL EXAM:  GENERAL: NAD, well-groomed, well-developed  HEAD:  Atraumatic, Normocephalic  EYES: EOMI, PERRLA, conjunctiva and sclera clear  ENMT: No tonsillar erythema, exudates, or enlargement; Moist mucous membranes, Good dentition, No lesions  NECK: Supple, No JVD, Normal thyroid  NERVOUS SYSTEM:  Alert & Oriented X3, Good concentration; Motor Strength 5/5 B/L upper and lower extremities; DTRs 2+ intact and symmetric  CHEST/LUNG: Clear to percussion bilaterally; No rales, rhonchi, wheezing, or rubs  HEART: Regular rate and rhythm; No murmurs, rubs, or gallops  ABDOMEN: Soft, Nontender, Nondistended; Bowel sounds present  EXTREMITIES:  2+ Peripheral Pulses, No clubbing, cyanosis, or edema  LYMPH: No lymphadenopathy noted  SKIN: No rashes or lesions    Care Discussed with Consultants/Other Providers [x ] YES  [ ] NO      Code Status: [] Full Code [] DNR [] DNI [] Goals of Care:   Disposition: [] ICU [] Stroke Unit [] RCU []PCU []Floor [] Discharge Home         MARCY Van
[facility]   MD DENVER DUBOSE MD ANGELA WONG, PA    TEL:  OFFICE: [phone]  DR CRISTINA CELL: [phone]  DR. MAYES CELL: [phone]  LORENA RUBIN CELL: [phone]        Patient is a 89y old  Female who presents with a chief complaint of Syncope (23 Oct 2018 21:36)      Patient seen and examined at bedside. No chest pain/sob    VITALS:  T(F): 97.3 (10-24-18 @ 11:31), Max: 98.4 (10-23-18 @ 19:42)  HR: 79 (10-24-18 @ 11:31)  BP: 146/72 (10-24-18 @ 11:31)  RR: 18 (10-24-18 @ 11:31)  SpO2: 100% (10-24-18 @ 11:31)  Wt(kg): --        PHYSICAL EXAM:  Constitutional: NAD  Neck: No JVD  Respiratory: CTAB, no wheezes, rales or rhonchi  Cardiovascular: S1, S2, RRR  Gastrointestinal: BS+, soft, NT/ND  Extremities: No peripheral edema    Hospital Medications:   MEDICATIONS  (STANDING):  amLODIPine   Tablet 5 milliGRAM(s) Oral daily  aspirin enteric coated 81 milliGRAM(s) Oral daily  atorvastatin 10 milliGRAM(s) Oral at bedtime  clopidogrel Tablet 75 milliGRAM(s) Oral daily  dextrose 5%. 1000 milliLiter(s) (50 mL/Hr) IV Continuous <Continuous>  dextrose 50% Injectable 12.5 Gram(s) IV Push once  dextrose 50% Injectable 25 Gram(s) IV Push once  dextrose 50% Injectable 25 Gram(s) IV Push once  enoxaparin Injectable 40 milliGRAM(s) SubCutaneous every 24 hours  gabapentin 300 milliGRAM(s) Oral two times a day  influenza  Vaccine (HIGH DOSE) 0.5 milliLiter(s) IntraMuscular once  insulin lispro (HumaLOG) corrective regimen sliding scale   SubCutaneous three times a day before meals  insulin lispro (HumaLOG) corrective regimen sliding scale   SubCutaneous at bedtime  lisinopril 20 milliGRAM(s) Oral daily  memantine 10 milliGRAM(s) Oral two times a day  timolol 0.5% Solution 1 Drop(s) Both EYES every 12 hours      LABS:  10-23    137  |  98  |  17  ----------------------------<  143<H>  3.5   |  25  |  0.63    Ca    8.8      23 Oct 2018 05:20      Creatinine Trend: 0.63 <--, 0.71 <--, 0.61 <--, 0.64 <--, 0.60 <--                                11.9   6.95  )-----------( 217      ( 23 Oct 2018 05:20 )             36.8     Urine Studies:  Urinalysis - [10-16-18 @ 16:00]      Color YELLOW / Appearance Lt TURBID / SG 1.020 / pH 6.0      Gluc 150 / Ketone SMALL  / Bili NEGATIVE / Urobili NORMAL       Blood NEGATIVE / Protein 10 / Leuk Est MODERATE / Nitrite NEGATIVE      RBC 6-10 / WBC 11-25 / Hyaline NEGATIVE / Gran  / Sq Epi MODERATE / Non Sq Epi  / Bacteria NEGATIVE    Urine Creatinine 51.60      [10-17-18 @ 17:10]  Urine Protein 14.1      [10-17-18 @ 17:10]  Urine Sodium 76      [10-17-18 @ 17:10]  Urine Osmolality 491      [10-17-18 @ 17:10]    HbA1c 7.8      [10-17-18 @ 05:40]  TSH 2.03      [10-18-18 @ 06:45]  Lipid: chol 140, TG 88, HDL 75, LDL 55      [10-17-18 @ 05:40]        RADIOLOGY & ADDITIONAL STUDIES:
Jimbo Trevino MD  Interventional Cardiology  [place] Office : [address] [place] [address] [address]. [address]  Tel:   [place] Office : [address] [facility] [place] [address]  Tel: [phone]  Cell : [phone]    Subjective : Pt lying in bed comfortable, not in distress, denies any chest pain or SOB  	  MEDICATIONS:  aspirin enteric coated 81 milliGRAM(s) Oral daily  clopidogrel Tablet 75 milliGRAM(s) Oral daily  enoxaparin Injectable 40 milliGRAM(s) SubCutaneous every 24 hours  lisinopril 20 milliGRAM(s) Oral daily        acetaminophen   Tablet .. 650 milliGRAM(s) Oral every 6 hours PRN  gabapentin 100 milliGRAM(s) Oral three times a day  memantine 10 milliGRAM(s) Oral two times a day      atorvastatin 10 milliGRAM(s) Oral at bedtime  dextrose 40% Gel 15 Gram(s) Oral once PRN  dextrose 50% Injectable 12.5 Gram(s) IV Push once  dextrose 50% Injectable 25 Gram(s) IV Push once  dextrose 50% Injectable 25 Gram(s) IV Push once  glucagon  Injectable 1 milliGRAM(s) IntraMuscular once PRN  insulin lispro (HumaLOG) corrective regimen sliding scale   SubCutaneous three times a day before meals  insulin lispro (HumaLOG) corrective regimen sliding scale   SubCutaneous at bedtime    dextrose 5%. 1000 milliLiter(s) IV Continuous <Continuous>  influenza   Vaccine 0.5 milliLiter(s) IntraMuscular once  sodium chloride 0.9%. 1000 milliLiter(s) IV Continuous <Continuous>  sodium chloride 0.9%. 1000 milliLiter(s) IV Continuous <Continuous>  timolol 0.5% Solution 1 Drop(s) Both EYES every 12 hours      PHYSICAL EXAM:  T(C): 36.3 (10-20-18 @ 05:41), Max: 36.4 (10-19-18 @ 14:51)  HR: 63 (10-20-18 @ 05:41) (63 - 71)  BP: 146/79 (10-20-18 @ 05:41) (146/79 - 192/99)  RR: 17 (10-20-18 @ 05:41) (17 - 18)  SpO2: 99% (10-20-18 @ 05:41) (99% - 100%)  Wt(kg): --  I&O's Summary        Appearance: Normal	  HEENT:   Normal oral mucosa, PERRL, EOMI	  Cardiovascular: Normal S1 S2, No JVD, systolic murmur+, No edema  Respiratory: Lungs clear to auscultation	  Gastrointestinal:  Soft, Non-tender, + BS	  Extremities: Normal range of motion, No clubbing, cyanosis or edema                                    13.2   8.83  )-----------( 227      ( 20 Oct 2018 06:01 )             40.6     10-20    136  |  97<L>  |  14  ----------------------------<  169<H>  3.9   |  23  |  0.61    Ca    9.1      20 Oct 2018 06:01  Phos  3.2     10-19  Mg     1.7     10-19      proBNP:   Lipid Profile:   HgA1c:   TSH:
Jimbo Trevino MD  Interventional Cardiology  [place] Office : [address] [place] [address]  Tel:   [place] Office : [address] [facility] [place] [address]  Tel: [phone]  Cell : [phone]    Subjective : Pt lying in bed comfortable, not in distress, denies any chest pain or SOB  	  MEDICATIONS:  amLODIPine   Tablet 5 milliGRAM(s) Oral daily  aspirin enteric coated 81 milliGRAM(s) Oral daily  clopidogrel Tablet 75 milliGRAM(s) Oral daily  enoxaparin Injectable 40 milliGRAM(s) SubCutaneous every 24 hours  lisinopril 20 milliGRAM(s) Oral daily        acetaminophen   Tablet .. 650 milliGRAM(s) Oral every 6 hours PRN  gabapentin 300 milliGRAM(s) Oral two times a day  memantine 10 milliGRAM(s) Oral two times a day      atorvastatin 10 milliGRAM(s) Oral at bedtime  dextrose 40% Gel 15 Gram(s) Oral once PRN  dextrose 50% Injectable 12.5 Gram(s) IV Push once  dextrose 50% Injectable 25 Gram(s) IV Push once  dextrose 50% Injectable 25 Gram(s) IV Push once  glucagon  Injectable 1 milliGRAM(s) IntraMuscular once PRN  insulin lispro (HumaLOG) corrective regimen sliding scale   SubCutaneous three times a day before meals  insulin lispro (HumaLOG) corrective regimen sliding scale   SubCutaneous at bedtime    dextrose 5%. 1000 milliLiter(s) IV Continuous <Continuous>  influenza  Vaccine (HIGH DOSE) 0.5 milliLiter(s) IntraMuscular once  timolol 0.5% Solution 1 Drop(s) Both EYES every 12 hours      PHYSICAL EXAM:  T(C): 36.3 (10-24-18 @ 11:31), Max: 36.9 (10-23-18 @ 19:42)  HR: 79 (10-24-18 @ 11:31) (78 - 79)  BP: 146/72 (10-24-18 @ 11:31) (145/76 - 165/80)  RR: 18 (10-24-18 @ 11:31) (18 - 18)  SpO2: 100% (10-24-18 @ 11:31) (98% - 100%)  Wt(kg): --  I&O's Summary        Appearance: Normal	  HEENT:   Normal oral mucosa, PERRL, EOMI	  Cardiovascular: Normal S1 S2 +, No JVD, systolic murmur + No edema  Respiratory: Lungs clear to auscultation	  Gastrointestinal:  Soft, Non-tender, + BS	  Extremities:  No clubbing, cyanosis or edema                                    11.9   6.95  )-----------( 217      ( 23 Oct 2018 05:20 )             36.8     10-23    137  |  98  |  17  ----------------------------<  143<H>  3.5   |  25  |  0.63    Ca    8.8      23 Oct 2018 05:20      proBNP:   Lipid Profile:   HgA1c:   TSH:
Jimbo Trevino MD  Interventional Cardiology  [place] Office : [address] [place] [address]  Tel:   [place] Office : [address] [facility] [place] [address]  Tel: [phone]  Cell : [phone]    Subjective : Pt lying in bed comfortable, not in distress, denies any chest pain or SOB  	  MEDICATIONS:  amLODIPine   Tablet 5 milliGRAM(s) Oral daily  aspirin enteric coated 81 milliGRAM(s) Oral daily  clopidogrel Tablet 75 milliGRAM(s) Oral daily  enoxaparin Injectable 40 milliGRAM(s) SubCutaneous every 24 hours  lisinopril 20 milliGRAM(s) Oral daily        acetaminophen   Tablet .. 650 milliGRAM(s) Oral every 6 hours PRN  gabapentin 300 milliGRAM(s) Oral two times a day  memantine 10 milliGRAM(s) Oral two times a day      atorvastatin 10 milliGRAM(s) Oral at bedtime  dextrose 40% Gel 15 Gram(s) Oral once PRN  dextrose 50% Injectable 12.5 Gram(s) IV Push once  dextrose 50% Injectable 25 Gram(s) IV Push once  dextrose 50% Injectable 25 Gram(s) IV Push once  glucagon  Injectable 1 milliGRAM(s) IntraMuscular once PRN  insulin lispro (HumaLOG) corrective regimen sliding scale   SubCutaneous three times a day before meals  insulin lispro (HumaLOG) corrective regimen sliding scale   SubCutaneous at bedtime    dextrose 5%. 1000 milliLiter(s) IV Continuous <Continuous>  timolol 0.5% Solution 1 Drop(s) Both EYES every 12 hours      PHYSICAL EXAM:  T(C): 36.4 (10-25-18 @ 14:07), Max: 36.7 (10-25-18 @ 05:57)  HR: 73 (10-25-18 @ 14:07) (73 - 76)  BP: 107/58 (10-25-18 @ 14:07) (107/58 - 178/107)  RR: 17 (10-25-18 @ 14:07) (17 - 18)  SpO2: 88% (10-25-18 @ 14:07) (88% - 100%)  Wt(kg): --  I&O's Summary        Appearance: Normal	  HEENT:   Normal oral mucosa, PERRL, EOMI	  Cardiovascular: Normal S1 S2, No JVD, No murmurs, No edema  Respiratory: Lungs clear to auscultation	  Gastrointestinal:  Soft, Non-tender, + BS	  Extremities: Normal range of motion, No clubbing, cyanosis or edema                      proBNP:   Lipid Profile:   HgA1c:   TSH:
Jimbo Trevino MD  Interventional Cardiology  [place] Office : [address] [place] [address] [place]. [address]  Tel:   [place] Office : [address] [facility] [place] [address]  Tel: [phone]  Cell : [phone]    Subjective : Pt lying in bed comfortable, not in distress, denies any chest pain or SOB  	  MEDICATIONS:  aspirin enteric coated 81 milliGRAM(s) Oral daily  clopidogrel Tablet 75 milliGRAM(s) Oral daily  enoxaparin Injectable 40 milliGRAM(s) SubCutaneous every 24 hours  hydrALAZINE 25 milliGRAM(s) Oral two times a day  lisinopril 20 milliGRAM(s) Oral daily        acetaminophen   Tablet .. 650 milliGRAM(s) Oral every 6 hours PRN  gabapentin 100 milliGRAM(s) Oral three times a day  memantine 10 milliGRAM(s) Oral two times a day      atorvastatin 10 milliGRAM(s) Oral at bedtime  dextrose 40% Gel 15 Gram(s) Oral once PRN  dextrose 50% Injectable 12.5 Gram(s) IV Push once  dextrose 50% Injectable 25 Gram(s) IV Push once  dextrose 50% Injectable 25 Gram(s) IV Push once  glucagon  Injectable 1 milliGRAM(s) IntraMuscular once PRN  insulin lispro (HumaLOG) corrective regimen sliding scale   SubCutaneous three times a day before meals  insulin lispro (HumaLOG) corrective regimen sliding scale   SubCutaneous at bedtime    dextrose 5%. 1000 milliLiter(s) IV Continuous <Continuous>  influenza  Vaccine (HIGH DOSE) 0.5 milliLiter(s) IntraMuscular once  sodium chloride 0.9%. 1000 milliLiter(s) IV Continuous <Continuous>  sodium chloride 0.9%. 1000 milliLiter(s) IV Continuous <Continuous>  timolol 0.5% Solution 1 Drop(s) Both EYES every 12 hours      PHYSICAL EXAM:  T(C): 36.9 (10-21-18 @ 05:50), Max: 36.9 (10-20-18 @ 19:23)  HR: 73 (10-21-18 @ 05:50) (73 - 84)  BP: 110/60 (10-21-18 @ 05:50) (110/60 - 183/90)  RR: 17 (10-21-18 @ 05:50) (17 - 18)  SpO2: 98% (10-21-18 @ 05:50) (98% - 98%)  Wt(kg): --  I&O's Summary      Appearance: Normal	  HEENT:   Normal oral mucosa, PERRL, EOMI	  Cardiovascular: Normal S1 S2, No JVD, systolic murmur+, No edema  Respiratory: Lungs clear to auscultation	  Gastrointestinal:  Soft, Non-tender, + BS	  Extremities: Normal range of motion, No clubbing, cyanosis or edema                                        13.2   8.83  )-----------( 227      ( 20 Oct 2018 06:01 )             40.6     10-20    136  |  97<L>  |  14  ----------------------------<  169<H>  3.9   |  23  |  0.61    Ca    9.1      20 Oct 2018 06:01      proBNP:   Lipid Profile:   HgA1c:   TSH:
Jimbo Trevino MD  Interventional Cardiology  [place] Office : [address] [place] [address]  Tel:   [place] Office : [address] [facility] [place] [address]  Tel: [phone]  Cell : [phone]    Subjective : Pt lying in bed comfortable, not in distress, denies any chest pain or SOB  	  MEDICATIONS:  aspirin enteric coated 81 milliGRAM(s) Oral daily  clopidogrel Tablet 75 milliGRAM(s) Oral daily  enoxaparin Injectable 40 milliGRAM(s) SubCutaneous every 24 hours  lisinopril 20 milliGRAM(s) Oral daily        acetaminophen   Tablet .. 650 milliGRAM(s) Oral every 6 hours PRN  memantine 10 milliGRAM(s) Oral two times a day      atorvastatin 10 milliGRAM(s) Oral at bedtime  dextrose 40% Gel 15 Gram(s) Oral once PRN  dextrose 50% Injectable 12.5 Gram(s) IV Push once  dextrose 50% Injectable 25 Gram(s) IV Push once  dextrose 50% Injectable 25 Gram(s) IV Push once  glucagon  Injectable 1 milliGRAM(s) IntraMuscular once PRN  insulin lispro (HumaLOG) corrective regimen sliding scale   SubCutaneous three times a day before meals  insulin lispro (HumaLOG) corrective regimen sliding scale   SubCutaneous at bedtime    dextrose 5%. 1000 milliLiter(s) IV Continuous <Continuous>  influenza   Vaccine 0.5 milliLiter(s) IntraMuscular once  sodium chloride 0.9%. 1000 milliLiter(s) IV Continuous <Continuous>  timolol 0.5% Solution 1 Drop(s) Both EYES every 12 hours      PHYSICAL EXAM:  T(C): 36.4 (10-18-18 @ 13:43), Max: 36.6 (10-18-18 @ 06:03)  HR: 71 (10-18-18 @ 13:43) (64 - 71)  BP: 155/81 (10-18-18 @ 13:43) (155/81 - 169/74)  RR: 18 (10-18-18 @ 13:43) (18 - 18)  SpO2: 100% (10-18-18 @ 13:43) (100% - 100%)  Wt(kg): --  I&O's Summary        Appearance: Normal	  HEENT:   Normal oral mucosa, PERRL, EOMI	  Cardiovascular: Normal S1 S2, No JVD, No murmurs, No edema  Respiratory: Lungs clear to auscultation	  Gastrointestinal:  Soft, Non-tender, + BS	  Extremities: Normal range of motion, No clubbing, cyanosis or edema                                    13.5   9.04  )-----------( 198      ( 18 Oct 2018 06:45 )             41.5     10-18    136  |  97<L>  |  16  ----------------------------<  149<H>  4.0   |  25  |  0.60    Ca    9.1      18 Oct 2018 06:45  Phos  3.4     10-18  Mg     1.8     10-18      proBNP:   Lipid Profile:   HgA1c:   TSH: Thyroid Stimulating Hormone, Serum: 2.03 uIU/mL (10-18 @ 06:45)
Jimbo Trevino MD  Interventional Cardiology  [place] Office : [address] [place] [address]  Tel:   [place] Office : [address] [facility] [place] [address]  Tel: [phone]  Cell : [phone]    Subjective: Pt lying in bed comfortable, not in distress, denies any chest pain or SOB  	  MEDICATIONS:  amLODIPine   Tablet 5 milliGRAM(s) Oral daily  aspirin enteric coated 81 milliGRAM(s) Oral daily  clopidogrel Tablet 75 milliGRAM(s) Oral daily  enoxaparin Injectable 40 milliGRAM(s) SubCutaneous every 24 hours  lisinopril 20 milliGRAM(s) Oral daily        acetaminophen   Tablet .. 650 milliGRAM(s) Oral every 6 hours PRN  gabapentin 300 milliGRAM(s) Oral two times a day  memantine 10 milliGRAM(s) Oral two times a day      atorvastatin 10 milliGRAM(s) Oral at bedtime  dextrose 40% Gel 15 Gram(s) Oral once PRN  dextrose 50% Injectable 12.5 Gram(s) IV Push once  dextrose 50% Injectable 25 Gram(s) IV Push once  dextrose 50% Injectable 25 Gram(s) IV Push once  glucagon  Injectable 1 milliGRAM(s) IntraMuscular once PRN  insulin lispro (HumaLOG) corrective regimen sliding scale   SubCutaneous three times a day before meals  insulin lispro (HumaLOG) corrective regimen sliding scale   SubCutaneous at bedtime    dextrose 5%. 1000 milliLiter(s) IV Continuous <Continuous>  influenza  Vaccine (HIGH DOSE) 0.5 milliLiter(s) IntraMuscular once  timolol 0.5% Solution 1 Drop(s) Both EYES every 12 hours      PHYSICAL EXAM:  T(C): 36.9 (10-23-18 @ 19:42), Max: 36.9 (10-23-18 @ 19:42)  HR: 79 (10-23-18 @ 19:42) (74 - 79)  BP: 165/80 (10-23-18 @ 19:42) (142/88 - 177/87)  RR: 18 (10-23-18 @ 19:42) (18 - 18)  SpO2: 98% (10-23-18 @ 19:42) (97% - 100%)  Wt(kg): --  I&O's Summary    Height (cm): 157.48 (10-23 @ 11:45)  Weight (kg): 45.3 (10-23 @ 11:45)  BMI (kg/m2): 18.3 (10-23 @ 11:45)  BSA (m2): 1.42 (10-23 @ 11:45)    Appearance: Normal	  HEENT:   Normal oral mucosa, PERRL, EOMI	  Cardiovascular: Normal S1 S2, No JVD, systolic murmur present, No edema  Respiratory: Lungs clear to auscultation	  Gastrointestinal:  Soft, Non-tender, + BS	  Extremities: Normal range of motion, No clubbing, cyanosis or edema                                    11.9   6.95  )-----------( 217      ( 23 Oct 2018 05:20 )             36.8     10-23    137  |  98  |  17  ----------------------------<  143<H>  3.5   |  25  |  0.63    Ca    8.8      23 Oct 2018 05:20      proBNP:   Lipid Profile:   HgA1c:   TSH:
Jimbo Trevino MD  Interventional Cardiology  [place] Office : [address] [place] [address]  Tel:   [place] Office : [address] [facility] [place] [address]  Tel: [phone]  Cell : [phone]    Subjective : Pt lying in bed comfortable, not in distress, denies any chest pain or SOB  	  MEDICATIONS:  aspirin enteric coated 81 milliGRAM(s) Oral daily  clopidogrel Tablet 75 milliGRAM(s) Oral daily  enoxaparin Injectable 40 milliGRAM(s) SubCutaneous every 24 hours  lisinopril 20 milliGRAM(s) Oral daily  acetaminophen   Tablet .. 650 milliGRAM(s) Oral every 6 hours PRN  gabapentin 100 milliGRAM(s) Oral three times a day  memantine 10 milliGRAM(s) Oral two times a day  atorvastatin 10 milliGRAM(s) Oral at bedtime  dextrose 40% Gel 15 Gram(s) Oral once PRN  dextrose 50% Injectable 12.5 Gram(s) IV Push once  dextrose 50% Injectable 25 Gram(s) IV Push once  dextrose 50% Injectable 25 Gram(s) IV Push once  glucagon  Injectable 1 milliGRAM(s) IntraMuscular once PRN  insulin lispro (HumaLOG) corrective regimen sliding scale   SubCutaneous three times a day before meals  insulin lispro (HumaLOG) corrective regimen sliding scale   SubCutaneous at bedtime  dextrose 5%. 1000 milliLiter(s) IV Continuous <Continuous>  influenza  Vaccine (HIGH DOSE) 0.5 milliLiter(s) IntraMuscular once  sodium chloride 0.9%. 1000 milliLiter(s) IV Continuous <Continuous>  timolol 0.5% Solution 1 Drop(s) Both EYES every 12 hours      PHYSICAL EXAM:  T(C): 36.8 (10-22-18 @ 06:43), Max: 36.8 (10-21-18 @ 17:51)  HR: 78 (10-22-18 @ 06:43) (71 - 93)  BP: 173/84 (10-22-18 @ 06:43) (140/90 - 173/84)  RR: 18 (10-22-18 @ 06:43) (17 - 18)  SpO2: 99% (10-22-18 @ 06:43) (97% - 100%)  Wt(kg): --  I&O's Summary        Appearance: Normal	  HEENT:   Normal oral mucosa, PERRL, EOMI	  Cardiovascular: Normal S1 S2, No JVD, systolic murmur +, No edema  Respiratory: Lungs clear to auscultation	  Gastrointestinal:  Soft, Non-tender, + BS	  Extremities: Normal range of motion, No clubbing, cyanosis or edema                                    12.7   8.57  )-----------( 235      ( 22 Oct 2018 06:40 )             39.3     10-22    138  |  102  |  17  ----------------------------<  166<H>  4.1   |  24  |  0.71    Ca    9.1      22 Oct 2018 06:40      proBNP:   Lipid Profile:   HgA1c:   TSH:
[facility]   MD DENVER DUBOSE MD ANGELA WONG, PA    TEL:  OFFICE: [phone]  DR CRISTINA CELL: [phone]  DR. MAYES CELL: [phone]  LORENA RUBIN CELL: [phone]        Patient is a 89y old  Female who presents with a chief complaint of Syncope (19 Oct 2018 04:53)      Patient seen and examined at bedside. No chest pain/sob    VITALS:  T(F): 97.4 (10-19-18 @ 06:19), Max: 97.8 (10-18-18 @ 21:55)  HR: 64 (10-19-18 @ 06:19)  BP: 137/87 (10-19-18 @ 06:19)  RR: 17 (10-19-18 @ 06:19)  SpO2: 100% (10-19-18 @ 06:19)  Wt(kg): --        PHYSICAL EXAM:  Constitutional: NAD  Neck: No JVD  Respiratory: CTAB, no wheezes, rales or rhonchi  Cardiovascular: S1, S2, RRR  Gastrointestinal: BS+, soft, NT/ND  Extremities: No peripheral edema    Hospital Medications:   MEDICATIONS  (STANDING):  aspirin enteric coated 81 milliGRAM(s) Oral daily  atorvastatin 10 milliGRAM(s) Oral at bedtime  clopidogrel Tablet 75 milliGRAM(s) Oral daily  dextrose 5%. 1000 milliLiter(s) (50 mL/Hr) IV Continuous <Continuous>  dextrose 50% Injectable 12.5 Gram(s) IV Push once  dextrose 50% Injectable 25 Gram(s) IV Push once  dextrose 50% Injectable 25 Gram(s) IV Push once  enoxaparin Injectable 40 milliGRAM(s) SubCutaneous every 24 hours  influenza   Vaccine 0.5 milliLiter(s) IntraMuscular once  insulin lispro (HumaLOG) corrective regimen sliding scale   SubCutaneous three times a day before meals  insulin lispro (HumaLOG) corrective regimen sliding scale   SubCutaneous at bedtime  lisinopril 20 milliGRAM(s) Oral daily  memantine 10 milliGRAM(s) Oral two times a day  sodium chloride 0.9%. 1000 milliLiter(s) (75 mL/Hr) IV Continuous <Continuous>  timolol 0.5% Solution 1 Drop(s) Both EYES every 12 hours      LABS:  10-19    136  |  98  |  16  ----------------------------<  159<H>  4.2   |  26  |  0.64    Ca    9.4      19 Oct 2018 06:50  Phos  3.2     10-19  Mg     1.7     10-19      Creatinine Trend: 0.64 <--, 0.60 <--, 0.66 <--, 0.73 <--    Phosphorus Level, Serum: 3.2 mg/dL (10-19 @ 06:50)                              14.1   11.55 )-----------( 240      ( 19 Oct 2018 06:50 )             43.5     Urine Studies:  Urinalysis - [10-16-18 @ 16:00]      Color YELLOW / Appearance Lt TURBID / SG 1.020 / pH 6.0      Gluc 150 / Ketone SMALL  / Bili NEGATIVE / Urobili NORMAL       Blood NEGATIVE / Protein 10 / Leuk Est MODERATE / Nitrite NEGATIVE      RBC 6-10 / WBC 11-25 / Hyaline NEGATIVE / Gran  / Sq Epi MODERATE / Non Sq Epi  / Bacteria NEGATIVE    Urine Creatinine 51.60      [10-17-18 @ 17:10]  Urine Protein 14.1      [10-17-18 @ 17:10]  Urine Sodium 76      [10-17-18 @ 17:10]  Urine Osmolality 491      [10-17-18 @ 17:10]    HbA1c 7.8      [10-17-18 @ 05:40]  TSH 2.03      [10-18-18 @ 06:45]  Lipid: chol 140, TG 88, HDL 75, LDL 55      [10-17-18 @ 05:40]        RADIOLOGY & ADDITIONAL STUDIES:
Patient seen and examined at bedside. No chest pain/sob    VITALS:  T(F): 98.4 (10-21-18 @ 05:50), Max: 98.4 (10-20-18 @ 19:23)  HR: 73 (10-21-18 @ 05:50)  BP: 110/60 (10-21-18 @ 05:50)  RR: 17 (10-21-18 @ 05:50)  SpO2: 98% (10-21-18 @ 05:50)  Wt(kg): --        PHYSICAL EXAM:  Constitutional: NAD  Neck: No JVD  Respiratory: CTAB, no wheezes, rales or rhonchi  Cardiovascular: S1, S2, RRR  Gastrointestinal: BS+, soft, NT/ND  Extremities: No peripheral edema    Hospital Medications:   MEDICATIONS  (STANDING):  aspirin enteric coated 81 milliGRAM(s) Oral daily  atorvastatin 10 milliGRAM(s) Oral at bedtime  clopidogrel Tablet 75 milliGRAM(s) Oral daily  dextrose 5%. 1000 milliLiter(s) (50 mL/Hr) IV Continuous <Continuous>  dextrose 50% Injectable 12.5 Gram(s) IV Push once  dextrose 50% Injectable 25 Gram(s) IV Push once  dextrose 50% Injectable 25 Gram(s) IV Push once  enoxaparin Injectable 40 milliGRAM(s) SubCutaneous every 24 hours  gabapentin 100 milliGRAM(s) Oral three times a day  hydrALAZINE 25 milliGRAM(s) Oral two times a day  influenza  Vaccine (HIGH DOSE) 0.5 milliLiter(s) IntraMuscular once  insulin lispro (HumaLOG) corrective regimen sliding scale   SubCutaneous three times a day before meals  insulin lispro (HumaLOG) corrective regimen sliding scale   SubCutaneous at bedtime  lisinopril 20 milliGRAM(s) Oral daily  memantine 10 milliGRAM(s) Oral two times a day  sodium chloride 0.9%. 1000 milliLiter(s) (75 mL/Hr) IV Continuous <Continuous>  sodium chloride 0.9%. 1000 milliLiter(s) (50 mL/Hr) IV Continuous <Continuous>  timolol 0.5% Solution 1 Drop(s) Both EYES every 12 hours      LABS:  10-20    136  |  97<L>  |  14  ----------------------------<  169<H>  3.9   |  23  |  0.61    Ca    9.1      20 Oct 2018 06:01      Creatinine Trend: 0.61 <--, 0.64 <--, 0.60 <--, 0.66 <--, 0.73 <--                              13.2   8.83  )-----------( 227      ( 20 Oct 2018 06:01 )             40.6     Urine Studies:  Sodium, Random Urine: 76 mmol/L (10-17 @ 17:10)  Osmolality, Random Urine: 491 mosmo/kg (10-17 @ 17:10)  Creatinine, Random Urine: 51.60 mg/dL (10-17 @ 17:10)  Protein, Random Urine: 14.1 mg/dL (10-17 @ 17:10)    Sodium, Random Urine: 76 mmol/L (10-17 @ 17:10)  Osmolality, Random Urine: 491 mosmo/kg (10-17 @ 17:10)  Creatinine, Random Urine: 51.60 mg/dL (10-17 @ 17:10)  Protein, Random Urine: 14.1 mg/dL (10-17 @ 17:10)    Urinalysis - [10-16-18 @ 16:00]      Color YELLOW / Appearance Lt TURBID / SG 1.020 / pH 6.0      Gluc 150 / Ketone SMALL  / Bili NEGATIVE / Urobili NORMAL       Blood NEGATIVE / Protein 10 / Leuk Est MODERATE / Nitrite NEGATIVE      RBC 6-10 / WBC 11-25 / Hyaline NEGATIVE / Gran  / Sq Epi MODERATE / Non Sq Epi  / Bacteria NEGATIVE    Urine Creatinine 51.60      [10-17-18 @ 17:10]  Urine Protein 14.1      [10-17-18 @ 17:10]  Urine Sodium 76      [10-17-18 @ 17:10]  Urine Osmolality 491      [10-17-18 @ 17:10]    HbA1c 7.8      [10-17-18 @ 05:40]  TSH 2.03      [10-18-18 @ 06:45]  Lipid: chol 140, TG 88, HDL 75, LDL 55      [10-17-18 @ 05:40]        RADIOLOGY & ADDITIONAL STUDIES:
[facility] [place] [facility]   MD DENVER DUBOSE MD RUORU WONG, PA    TEL:  OFFICE: [phone]  DR CRISTINA CELL: [phone]  OLGA RUBIN CELL: [phone]  DR. MAYES CELL: [phone]    RENAL FOLLOW UP NOTE  --------------------------------------------------------------------------------  HPI:      Pt seen and examined at bedside.   Melissa SOB, chest pain     PAST HISTORY  --------------------------------------------------------------------------------  No significant changes to PMH, PSH, FHx, SHx, unless otherwise noted    ALLERGIES & MEDICATIONS  --------------------------------------------------------------------------------  Allergies    No Known Allergies    Intolerances      Standing Inpatient Medications  aspirin enteric coated 81 milliGRAM(s) Oral daily  atorvastatin 10 milliGRAM(s) Oral at bedtime  clopidogrel Tablet 75 milliGRAM(s) Oral daily  dextrose 5%. 1000 milliLiter(s) IV Continuous <Continuous>  dextrose 50% Injectable 12.5 Gram(s) IV Push once  dextrose 50% Injectable 25 Gram(s) IV Push once  dextrose 50% Injectable 25 Gram(s) IV Push once  enoxaparin Injectable 40 milliGRAM(s) SubCutaneous every 24 hours  influenza   Vaccine 0.5 milliLiter(s) IntraMuscular once  insulin lispro (HumaLOG) corrective regimen sliding scale   SubCutaneous three times a day before meals  insulin lispro (HumaLOG) corrective regimen sliding scale   SubCutaneous at bedtime  lisinopril 10 milliGRAM(s) Oral daily  memantine 10 milliGRAM(s) Oral two times a day  sodium chloride 0.9%. 1000 milliLiter(s) IV Continuous <Continuous>  timolol 0.5% Solution 1 Drop(s) Both EYES every 12 hours    PRN Inpatient Medications  acetaminophen   Tablet .. 650 milliGRAM(s) Oral every 6 hours PRN  dextrose 40% Gel 15 Gram(s) Oral once PRN  glucagon  Injectable 1 milliGRAM(s) IntraMuscular once PRN      REVIEW OF SYSTEMS  --------------------------------------------------------------------------------  General: no fever  CVS: no chest pain  RESP: no sob, no cough  ABD: no abdominal pain  : no dysuria,  MSK: no edema     VITALS/PHYSICAL EXAM  --------------------------------------------------------------------------------  T(C): 36.4 (10-18-18 @ 13:43), Max: 36.6 (10-18-18 @ 06:03)  HR: 71 (10-18-18 @ 13:43) (64 - 71)  BP: 155/81 (10-18-18 @ 13:43) (129/62 - 169/74)  RR: 18 (10-18-18 @ 13:43) (18 - 18)  SpO2: 100% (10-18-18 @ 13:43) (100% - 100%)  Wt(kg): --  Height (cm): 157.48 (10-16-18 @ 16:59)  Weight (kg): 45.4 (10-16-18 @ 16:59)  BMI (kg/m2): 18.3 (10-16-18 @ 16:59)  BSA (m2): 1.42 (10-16-18 @ 16:59)      Physical Exam:  	Gen: NAD  	HEENT: MMM  	Pulm: CTA B/L  	CV: S1S2  	Abd: Soft, +BS  	Ext: No LE edema B/L                      Neuro: Awake   	Skin: Warm and Dry   	    LABS/STUDIES  --------------------------------------------------------------------------------              13.5   9.04  >-----------<  198      [10-18-18 @ 06:45]              41.5     136  |  97  |  16  ----------------------------<  149      [10-18-18 @ 06:45]  4.0   |  25  |  0.60        Ca     9.1     [10-18-18 @ 06:45]      Mg     1.8     [10-18-18 @ 06:45]      Phos  3.4     [10-18-18 @ 06:45]            Creatinine Trend:  SCr 0.60 [10-18 @ 06:45]  SCr 0.66 [10-17 @ 05:40]  SCr 0.73 [10-16 @ 11:43]    Urinalysis - [10-16-18 @ 16:00]      Color YELLOW / Appearance Lt TURBID / SG 1.020 / pH 6.0      Gluc 150 / Ketone SMALL  / Bili NEGATIVE / Urobili NORMAL       Blood NEGATIVE / Protein 10 / Leuk Est MODERATE / Nitrite NEGATIVE      RBC 6-10 / WBC 11-25 / Hyaline NEGATIVE / Gran  / Sq Epi MODERATE / Non Sq Epi  / Bacteria NEGATIVE    Urine Creatinine 51.60      [10-17-18 @ 17:10]  Urine Protein 14.1      [10-17-18 @ 17:10]  Urine Sodium 76      [10-17-18 @ 17:10]  Urine Osmolality 491      [10-17-18 @ 17:10]    HbA1c 7.8      [10-17-18 @ 05:40]  TSH 2.03      [10-18-18 @ 06:45]  Lipid: chol 140, TG 88, HDL 75, LDL 55      [10-17-18 @ 05:40]
[facility] [place] [facility]   MD DENVER DUBOSE MD ANGELA WONG, PA    TEL:  OFFICE: [phone]  DR CRISTINA CELL: [phone]  DR. MAYES CELL: [phone]  LORENA RUBIN CELL: [phone]        Patient is a 89y old  Female who presents with a chief complaint of Syncope (19 Oct 2018 20:42)      Patient seen and examined at bedside. No chest pain/sob    VITALS:  T(F): 97.3 (10-20-18 @ 05:41), Max: 97.5 (10-19-18 @ 14:51)  HR: 63 (10-20-18 @ 05:41)  BP: 146/79 (10-20-18 @ 05:41)  RR: 17 (10-20-18 @ 05:41)  SpO2: 99% (10-20-18 @ 05:41)  Wt(kg): --        PHYSICAL EXAM:  Constitutional: NAD  Neck: No JVD  Respiratory: CTAB, no wheezes, rales or rhonchi  Cardiovascular: S1, S2, RRR  Gastrointestinal: BS+, soft, NT/ND  Extremities: No peripheral edema    Hospital Medications:   MEDICATIONS  (STANDING):  aspirin enteric coated 81 milliGRAM(s) Oral daily  atorvastatin 10 milliGRAM(s) Oral at bedtime  clopidogrel Tablet 75 milliGRAM(s) Oral daily  dextrose 5%. 1000 milliLiter(s) (50 mL/Hr) IV Continuous <Continuous>  dextrose 50% Injectable 12.5 Gram(s) IV Push once  dextrose 50% Injectable 25 Gram(s) IV Push once  dextrose 50% Injectable 25 Gram(s) IV Push once  enoxaparin Injectable 40 milliGRAM(s) SubCutaneous every 24 hours  gabapentin 100 milliGRAM(s) Oral three times a day  hydrALAZINE Injectable 10 milliGRAM(s) IV Push once  influenza   Vaccine 0.5 milliLiter(s) IntraMuscular once  insulin lispro (HumaLOG) corrective regimen sliding scale   SubCutaneous three times a day before meals  insulin lispro (HumaLOG) corrective regimen sliding scale   SubCutaneous at bedtime  lisinopril 20 milliGRAM(s) Oral daily  memantine 10 milliGRAM(s) Oral two times a day  sodium chloride 0.9%. 1000 milliLiter(s) (75 mL/Hr) IV Continuous <Continuous>  sodium chloride 0.9%. 1000 milliLiter(s) (75 mL/Hr) IV Continuous <Continuous>  timolol 0.5% Solution 1 Drop(s) Both EYES every 12 hours      LABS:  10-20    136  |  97<L>  |  14  ----------------------------<  169<H>  3.9   |  23  |  0.61    Ca    9.1      20 Oct 2018 06:01  Phos  3.2     10-19  Mg     1.7     10-19      Creatinine Trend: 0.61 <--, 0.64 <--, 0.60 <--, 0.66 <--, 0.73 <--                                13.2   8.83  )-----------( 227      ( 20 Oct 2018 06:01 )             40.6     Urine Studies:  Urinalysis - [10-16-18 @ 16:00]      Color YELLOW / Appearance Lt TURBID / SG 1.020 / pH 6.0      Gluc 150 / Ketone SMALL  / Bili NEGATIVE / Urobili NORMAL       Blood NEGATIVE / Protein 10 / Leuk Est MODERATE / Nitrite NEGATIVE      RBC 6-10 / WBC 11-25 / Hyaline NEGATIVE / Gran  / Sq Epi MODERATE / Non Sq Epi  / Bacteria NEGATIVE    Urine Creatinine 51.60      [10-17-18 @ 17:10]  Urine Protein 14.1      [10-17-18 @ 17:10]  Urine Sodium 76      [10-17-18 @ 17:10]  Urine Osmolality 491      [10-17-18 @ 17:10]    HbA1c 7.8      [10-17-18 @ 05:40]  TSH 2.03      [10-18-18 @ 06:45]  Lipid: chol 140, TG 88, HDL 75, LDL 55      [10-17-18 @ 05:40]        RADIOLOGY & ADDITIONAL STUDIES:
CARRI DRE  89y  Female      Patient is a 89y old  Female who presents with a chief complaint of Syncope (19 Oct 2018 11:48)  Patient c/o burning pain in feet-chronic,used to be on neurontin,would like to try again.no sob,no cp,no fever,no cough      INTERVAL HPI/OVERNIGHT EVENTS:  T(C): 36.2 (10-19-18 @ 19:06), Max: 36.6 (10-18-18 @ 21:55)  HR: 67 (10-19-18 @ 19:06) (58 - 71)  BP: 151/89 (10-19-18 @ 14:51) (137/87 - 182/92)  RR: 18 (10-19-18 @ 19:06) (17 - 18)  SpO2: 99% (10-19-18 @ 19:06) (98% - 100%)  Wt(kg): --  I&O's Summary    T(C): 36.2 (10-19-18 @ 19:06), Max: 36.6 (10-18-18 @ 21:55)  HR: 67 (10-19-18 @ 19:06) (58 - 71)  BP: 151/89 (10-19-18 @ 14:51) (137/87 - 182/92)  RR: 18 (10-19-18 @ 19:06) (17 - 18)  SpO2: 99% (10-19-18 @ 19:06) (98% - 100%)  Wt(kg): --Vital Signs Last 24 Hrs  T(C): 36.2 (19 Oct 2018 19:06), Max: 36.6 (18 Oct 2018 21:55)  T(F): 97.2 (19 Oct 2018 19:06), Max: 97.8 (18 Oct 2018 21:55)  HR: 67 (19 Oct 2018 19:06) (58 - 71)  BP: 151/89 (19 Oct 2018 14:51) (137/87 - 182/92)  BP(mean): --  RR: 18 (19 Oct 2018 19:06) (17 - 18)  SpO2: 99% (19 Oct 2018 19:06) (98% - 100%)    LABS:                        14.1   11.55 )-----------( 240      ( 19 Oct 2018 06:50 )             43.5     10-19    136  |  98  |  16  ----------------------------<  159<H>  4.2   |  26  |  0.64    Ca    9.4      19 Oct 2018 06:50  Phos  3.2     10-19  Mg     1.7     10-19          CAPILLARY BLOOD GLUCOSE      POCT Blood Glucose.: 149 mg/dL (19 Oct 2018 16:55)  POCT Blood Glucose.: 174 mg/dL (19 Oct 2018 11:54)  POCT Blood Glucose.: 130 mg/dL (19 Oct 2018 07:45)  POCT Blood Glucose.: 130 mg/dL (18 Oct 2018 21:50)            PAST MEDICAL & SURGICAL HISTORY:  Dementia  CAD (coronary artery disease)  DM (diabetes mellitus)  HLD (hyperlipidemia)  HTN (hypertension)  S/P coronary artery stent placement      MEDICATIONS  (STANDING):  aspirin enteric coated 81 milliGRAM(s) Oral daily  atorvastatin 10 milliGRAM(s) Oral at bedtime  clopidogrel Tablet 75 milliGRAM(s) Oral daily  dextrose 5%. 1000 milliLiter(s) (50 mL/Hr) IV Continuous <Continuous>  dextrose 50% Injectable 12.5 Gram(s) IV Push once  dextrose 50% Injectable 25 Gram(s) IV Push once  dextrose 50% Injectable 25 Gram(s) IV Push once  enoxaparin Injectable 40 milliGRAM(s) SubCutaneous every 24 hours  influenza   Vaccine 0.5 milliLiter(s) IntraMuscular once  insulin lispro (HumaLOG) corrective regimen sliding scale   SubCutaneous three times a day before meals  insulin lispro (HumaLOG) corrective regimen sliding scale   SubCutaneous at bedtime  lisinopril 20 milliGRAM(s) Oral daily  memantine 10 milliGRAM(s) Oral two times a day  sodium chloride 0.9%. 1000 milliLiter(s) (75 mL/Hr) IV Continuous <Continuous>  sodium chloride 0.9%. 1000 milliLiter(s) (75 mL/Hr) IV Continuous <Continuous>  timolol 0.5% Solution 1 Drop(s) Both EYES every 12 hours    MEDICATIONS  (PRN):  acetaminophen   Tablet .. 650 milliGRAM(s) Oral every 6 hours PRN Mild Pain (1 - 3), Moderate Pain (4 - 6)  dextrose 40% Gel 15 Gram(s) Oral once PRN Blood Glucose LESS THAN 70 milliGRAM(s)/deciliter  glucagon  Injectable 1 milliGRAM(s) IntraMuscular once PRN Glucose LESS THAN 70 milligrams/deciliter        RADIOLOGY & ADDITIONAL TESTS:    Imaging Personally Reviewed:  [ ] YES  [ ] NO    Consultant(s) Notes Reviewed:  [ ] YES  [ ] NO    PHYSICAL EXAM:  GENERAL: NAD, well-groomed, well-developed  HEAD:  Atraumatic, Normocephalic  EYES: EOMI, PERRLA, conjunctiva and sclera clear  ENMT: No tonsillar erythema, exudates, or enlargement; Moist mucous membranes, Good dentition, No lesions  NECK: Supple, No JVD, Normal thyroid  NERVOUS SYSTEM:  Alert & Oriented X3, Good concentration; Motor Strength 5/5 B/L upper and lower extremities; DTRs 2+ intact and symmetric  CHEST/LUNG: Clear to percussion bilaterally; No rales, rhonchi, wheezing, or rubs  HEART: Regular rate and rhythm; No murmurs, rubs, or gallops  ABDOMEN: Soft, Nontender, Nondistended; Bowel sounds present  EXTREMITIES:  2+ Peripheral Pulses, No clubbing, cyanosis, or edema  LYMPH: No lymphadenopathy noted  SKIN: No rashes or lesions    Care Discussed with Consultants/Other Providers [x ] YES  [ ] NO      Code Status: [] Full Code [] DNR [] DNI [] Goals of Care:   Disposition: [] ICU [] Stroke Unit [] RCU []PCU []Floor [] Discharge Home         AMANDA VanFACP
COOPERABDIFATAH DRE  89y  Female      Patient is a 89y old  Female who presents with a chief complaint of Syncope (18 Oct 2018 14:56)  Patient feels fine,no sob,no cp,no diziness,no cough        INTERVAL HPI/OVERNIGHT EVENTS:  T(C): 36.4 (10-18-18 @ 13:43), Max: 36.6 (10-18-18 @ 06:03)  HR: 71 (10-18-18 @ 13:43) (64 - 71)  BP: 155/81 (10-18-18 @ 13:43) (155/81 - 169/74)  RR: 18 (10-18-18 @ 13:43) (18 - 18)  SpO2: 100% (10-18-18 @ 13:43) (100% - 100%)  Wt(kg): --  I&O's Summary    T(C): 36.4 (10-18-18 @ 13:43), Max: 36.6 (10-18-18 @ 06:03)  HR: 71 (10-18-18 @ 13:43) (64 - 71)  BP: 155/81 (10-18-18 @ 13:43) (155/81 - 169/74)  RR: 18 (10-18-18 @ 13:43) (18 - 18)  SpO2: 100% (10-18-18 @ 13:43) (100% - 100%)  Wt(kg): --Vital Signs Last 24 Hrs  T(C): 36.4 (18 Oct 2018 13:43), Max: 36.6 (18 Oct 2018 06:03)  T(F): 97.5 (18 Oct 2018 13:43), Max: 97.8 (18 Oct 2018 06:03)  HR: 71 (18 Oct 2018 13:43) (64 - 71)  BP: 155/81 (18 Oct 2018 13:43) (155/81 - 169/74)  BP(mean): --  RR: 18 (18 Oct 2018 13:43) (18 - 18)  SpO2: 100% (18 Oct 2018 13:43) (100% - 100%)    LABS:                        13.5   9.04  )-----------( 198      ( 18 Oct 2018 06:45 )             41.5     10-18    136  |  97<L>  |  16  ----------------------------<  149<H>  4.0   |  25  |  0.60    Ca    9.1      18 Oct 2018 06:45  Phos  3.4     10-18  Mg     1.8     10-18          CAPILLARY BLOOD GLUCOSE      POCT Blood Glucose.: 177 mg/dL (18 Oct 2018 16:51)  POCT Blood Glucose.: 160 mg/dL (18 Oct 2018 11:20)  POCT Blood Glucose.: 134 mg/dL (18 Oct 2018 07:34)  POCT Blood Glucose.: 143 mg/dL (17 Oct 2018 21:03)            PAST MEDICAL & SURGICAL HISTORY:  Dementia  CAD (coronary artery disease)  DM (diabetes mellitus)  HLD (hyperlipidemia)  HTN (hypertension)  S/P coronary artery stent placement      MEDICATIONS  (STANDING):  aspirin enteric coated 81 milliGRAM(s) Oral daily  atorvastatin 10 milliGRAM(s) Oral at bedtime  clopidogrel Tablet 75 milliGRAM(s) Oral daily  dextrose 5%. 1000 milliLiter(s) (50 mL/Hr) IV Continuous <Continuous>  dextrose 50% Injectable 12.5 Gram(s) IV Push once  dextrose 50% Injectable 25 Gram(s) IV Push once  dextrose 50% Injectable 25 Gram(s) IV Push once  enoxaparin Injectable 40 milliGRAM(s) SubCutaneous every 24 hours  influenza   Vaccine 0.5 milliLiter(s) IntraMuscular once  insulin lispro (HumaLOG) corrective regimen sliding scale   SubCutaneous three times a day before meals  insulin lispro (HumaLOG) corrective regimen sliding scale   SubCutaneous at bedtime  lisinopril 20 milliGRAM(s) Oral daily  memantine 10 milliGRAM(s) Oral two times a day  sodium chloride 0.9%. 1000 milliLiter(s) (75 mL/Hr) IV Continuous <Continuous>  timolol 0.5% Solution 1 Drop(s) Both EYES every 12 hours    MEDICATIONS  (PRN):  acetaminophen   Tablet .. 650 milliGRAM(s) Oral every 6 hours PRN Mild Pain (1 - 3), Moderate Pain (4 - 6)  dextrose 40% Gel 15 Gram(s) Oral once PRN Blood Glucose LESS THAN 70 milliGRAM(s)/deciliter  glucagon  Injectable 1 milliGRAM(s) IntraMuscular once PRN Glucose LESS THAN 70 milligrams/deciliter        RADIOLOGY & ADDITIONAL TESTS:    Imaging Personally Reviewed:  [ ] YES  [ ] NO    Consultant(s) Notes Reviewed:  [x ] YES  [ ] NO    PHYSICAL EXAM:  GENERAL: NAD, well-groomed, well-developed  HEAD:  Atraumatic, Normocephalic  EYES: EOMI, PERRLA, conjunctiva and sclera clear  ENMT: No tonsillar erythema, exudates, or enlargement; Moist mucous membranes, Good dentition, No lesions  NECK: Supple, No JVD, Normal thyroid  NERVOUS SYSTEM:  Alert & Oriented X2, Good concentration; Motor Strength 5/5 B/L upper and lower extremities; DTRs 2+ intact and symmetric  CHEST/LUNG: Clear to percussion bilaterally; No rales, rhonchi, wheezing, or rubs  HEART: Regular rate and rhythm; No murmurs, rubs, or gallops  ABDOMEN: Soft, Nontender, Nondistended; Bowel sounds present  EXTREMITIES:  2+ Peripheral Pulses, No clubbing, cyanosis, or edema  LYMPH: No lymphadenopathy noted  SKIN: No rashes or lesions    Care Discussed with Consultants/Other Providers [x ] YES  [ ] NO      Code Status: [] Full Code [] DNR [] DNI [] Goals of Care:   Disposition: [] ICU [] Stroke Unit [] RCU []PCU []Floor [] Discharge Home         JERRY VanP
[facility] [place] [facility]   MD DENVER DUBOSE MD ANGELA WONG, PA    TEL:  OFFICE: [phone]  DR CRISTINA CELL: [phone]  DR. MAYES CELL: [phone]  LORENA RUBIN CELL: [phone]        Patient is a 89y old  Female who presents with a chief complaint of Syncope (24 Oct 2018 19:48)      Patient seen and examined at bedside. No chest pain/sob    VITALS:  T(F): 97.5 (10-25-18 @ 14:07), Max: 98 (10-25-18 @ 05:57)  HR: 73 (10-25-18 @ 14:07)  BP: 107/58 (10-25-18 @ 14:07)  RR: 17 (10-25-18 @ 14:07)  SpO2: 88% (10-25-18 @ 14:07)  Wt(kg): --        PHYSICAL EXAM:  Constitutional: NAD  Neck: No JVD  Respiratory: CTAB, no wheezes, rales or rhonchi  Cardiovascular: S1, S2, RRR  Gastrointestinal: BS+, soft, NT/ND  Extremities: No peripheral edema    Hospital Medications:   MEDICATIONS  (STANDING):  amLODIPine   Tablet 5 milliGRAM(s) Oral daily  aspirin enteric coated 81 milliGRAM(s) Oral daily  atorvastatin 10 milliGRAM(s) Oral at bedtime  clopidogrel Tablet 75 milliGRAM(s) Oral daily  dextrose 5%. 1000 milliLiter(s) (50 mL/Hr) IV Continuous <Continuous>  dextrose 50% Injectable 12.5 Gram(s) IV Push once  dextrose 50% Injectable 25 Gram(s) IV Push once  dextrose 50% Injectable 25 Gram(s) IV Push once  enoxaparin Injectable 40 milliGRAM(s) SubCutaneous every 24 hours  gabapentin 300 milliGRAM(s) Oral two times a day  insulin lispro (HumaLOG) corrective regimen sliding scale   SubCutaneous three times a day before meals  insulin lispro (HumaLOG) corrective regimen sliding scale   SubCutaneous at bedtime  lisinopril 20 milliGRAM(s) Oral daily  memantine 10 milliGRAM(s) Oral two times a day  timolol 0.5% Solution 1 Drop(s) Both EYES every 12 hours      LABS:        Creatinine Trend: 0.63 <--, 0.71 <--, 0.61 <--, 0.64 <--            Urine Studies:  Urinalysis - [10-16-18 @ 16:00]      Color YELLOW / Appearance Lt TURBID / SG 1.020 / pH 6.0      Gluc 150 / Ketone SMALL  / Bili NEGATIVE / Urobili NORMAL       Blood NEGATIVE / Protein 10 / Leuk Est MODERATE / Nitrite NEGATIVE      RBC 6-10 / WBC 11-25 / Hyaline NEGATIVE / Gran  / Sq Epi MODERATE / Non Sq Epi  / Bacteria NEGATIVE      HbA1c 7.8      [10-17-18 @ 05:40]  TSH 2.03      [10-18-18 @ 06:45]  Lipid: chol 140, TG 88, HDL 75, LDL 55      [10-17-18 @ 05:40]        RADIOLOGY & ADDITIONAL STUDIES:
DRE CHRISTINA  89y  Female      Patient is a 89y old  Female who presents with a chief complaint of Syncope (24 Oct 2018 12:42)  Patient feels fine.no new c/o,no sob,no cp,no fever,no cough,eating    REVIEW OF SYSTEMS:  CONSTITUTIONAL: No fever, weight loss, or fatigue  EYES: No eye pain, visual disturbances, or discharge  ENMT:  No difficulty hearing, tinnitus, vertigo; No sinus or throat pain  NECK: No pain or stiffness  RESPIRATORY: No cough, wheezing, chills or hemoptysis; No shortness of breath  CARDIOVASCULAR: No chest pain, palpitations, dizziness, or leg swelling  GASTROINTESTINAL: No abdominal or epigastric pain. No nausea, vomiting, or hematemesis; No diarrhea or constipation. No melena or hematochezia.  GENITOURINARY: No dysuria, frequency, hematuria, or incontinence  NEUROLOGICAL: No headaches, memory loss, loss of strength, numbness, or tremors  SKIN: No itching, burning, rashes, or lesions   ENDOCRINE: No heat or cold intolerance; No hair loss  MUSCULOSKELETAL: No joint pain or swelling; No muscle, back, or extremity pain  PSYCHIATRIC: No depression, anxiety, mood swings, or difficulty sleeping  HEME/LYMPH: No easy bruising, or bleeding gums        INTERVAL HPI/OVERNIGHT EVENTS:  T(C): 36.3 (10-24-18 @ 11:31), Max: 36.8 (10-24-18 @ 05:58)  HR: 79 (10-24-18 @ 11:31) (78 - 79)  BP: 146/72 (10-24-18 @ 11:31) (145/76 - 146/72)  RR: 18 (10-24-18 @ 11:31) (18 - 18)  SpO2: 100% (10-24-18 @ 11:31) (99% - 100%)  Wt(kg): --  I&O's Summary    T(C): 36.3 (10-24-18 @ 11:31), Max: 36.8 (10-24-18 @ 05:58)  HR: 79 (10-24-18 @ 11:31) (78 - 79)  BP: 146/72 (10-24-18 @ 11:31) (145/76 - 146/72)  RR: 18 (10-24-18 @ 11:31) (18 - 18)  SpO2: 100% (10-24-18 @ 11:31) (99% - 100%)  Wt(kg): --Vital Signs Last 24 Hrs  T(C): 36.3 (24 Oct 2018 11:31), Max: 36.8 (24 Oct 2018 05:58)  T(F): 97.3 (24 Oct 2018 11:31), Max: 98.2 (24 Oct 2018 05:58)  HR: 79 (24 Oct 2018 11:31) (78 - 79)  BP: 146/72 (24 Oct 2018 11:31) (145/76 - 146/72)  BP(mean): --  RR: 18 (24 Oct 2018 11:31) (18 - 18)  SpO2: 100% (24 Oct 2018 11:31) (99% - 100%)    LABS:                        11.9   6.95  )-----------( 217      ( 23 Oct 2018 05:20 )             36.8     10-23    137  |  98  |  17  ----------------------------<  143<H>  3.5   |  25  |  0.63    Ca    8.8      23 Oct 2018 05:20          CAPILLARY BLOOD GLUCOSE      POCT Blood Glucose.: 329 mg/dL (24 Oct 2018 16:37)  POCT Blood Glucose.: 253 mg/dL (24 Oct 2018 11:21)  POCT Blood Glucose.: 149 mg/dL (24 Oct 2018 07:34)  POCT Blood Glucose.: 253 mg/dL (23 Oct 2018 21:35)            PAST MEDICAL & SURGICAL HISTORY:  Dementia  CAD (coronary artery disease)  DM (diabetes mellitus)  HLD (hyperlipidemia)  HTN (hypertension)  S/P coronary artery stent placement      MEDICATIONS  (STANDING):  amLODIPine   Tablet 5 milliGRAM(s) Oral daily  aspirin enteric coated 81 milliGRAM(s) Oral daily  atorvastatin 10 milliGRAM(s) Oral at bedtime  clopidogrel Tablet 75 milliGRAM(s) Oral daily  dextrose 5%. 1000 milliLiter(s) (50 mL/Hr) IV Continuous <Continuous>  dextrose 50% Injectable 12.5 Gram(s) IV Push once  dextrose 50% Injectable 25 Gram(s) IV Push once  dextrose 50% Injectable 25 Gram(s) IV Push once  enoxaparin Injectable 40 milliGRAM(s) SubCutaneous every 24 hours  gabapentin 300 milliGRAM(s) Oral two times a day  influenza  Vaccine (HIGH DOSE) 0.5 milliLiter(s) IntraMuscular once  insulin lispro (HumaLOG) corrective regimen sliding scale   SubCutaneous three times a day before meals  insulin lispro (HumaLOG) corrective regimen sliding scale   SubCutaneous at bedtime  lisinopril 20 milliGRAM(s) Oral daily  memantine 10 milliGRAM(s) Oral two times a day  timolol 0.5% Solution 1 Drop(s) Both EYES every 12 hours    MEDICATIONS  (PRN):  acetaminophen   Tablet .. 650 milliGRAM(s) Oral every 6 hours PRN Mild Pain (1 - 3), Moderate Pain (4 - 6)  dextrose 40% Gel 15 Gram(s) Oral once PRN Blood Glucose LESS THAN 70 milliGRAM(s)/deciliter  glucagon  Injectable 1 milliGRAM(s) IntraMuscular once PRN Glucose LESS THAN 70 milligrams/deciliter        RADIOLOGY & ADDITIONAL TESTS:    Imaging Personally Reviewed:  [ ] YES  [ ] NO    Consultant(s) Notes Reviewed:  [ ] YES  [ ] NO    PHYSICAL EXAM:  GENERAL: NAD, well-groomed, well-developed  HEAD:  Atraumatic, Normocephalic  EYES: EOMI, PERRLA, conjunctiva and sclera clear  ENMT: No tonsillar erythema, exudates, or enlargement; Moist mucous membranes, Good dentition, No lesions  NECK: Supple, No JVD, Normal thyroid  NERVOUS SYSTEM:  Alert & Oriented X3, Good concentration; Motor Strength 5/5 B/L upper and lower extremities; DTRs 2+ intact and symmetric  CHEST/LUNG: Clear to percussion bilaterally; No rales, rhonchi, wheezing, or rubs  HEART: Regular rate and rhythm; No murmurs, rubs, or gallops  ABDOMEN: Soft, Nontender, Nondistended; Bowel sounds present  EXTREMITIES:  2+ Peripheral Pulses, No clubbing, cyanosis, or edema  LYMPH: No lymphadenopathy noted  SKIN: No rashes or lesions    Care Discussed with Consultants/Other Providers [x ] YES  [ ] NO      Code Status: [] Full Code [] DNR [] DNI [] Goals of Care:   Disposition: [] ICU [] Stroke Unit [] RCU []PCU []Floor [] Discharge Home         JERRY VanP

## 2018-10-25 NOTE — DIETITIAN INITIAL EVALUATION ADULT. - NS AS NUTRI INTERV STRATEGIES
1- Continue current diet order, which remains appropriate at this time. 2- Monitor weights, labs, BM's, skin integrity, p.o. intake. 3- Please Encourage po intake, assist with meals and menu selections, provide alternatives PRN. 4- RD remains available, re-consult as needed. Magdalena Herndon RD Pager #[phone]

## 2018-12-13 NOTE — ED ADULT NURSE REASSESSMENT NOTE - NS ED NURSE REASSESS COMMENT FT1
.  History and physical  Patient: Nimo Chong Date: 12/12/2018   male, 65 year old  Admit Date: 12/12/2018   Attending: Hitesh Rivero MD    Date of service 12/12/2018    PRIMARY CARE PROVIDER:  Deidra Colbert MD   aTTENDING Physician    Hitesh Rivero MD  CHIEF COMPLAINT    Transferred from [facility] due to bilateral hydronephrosis and SUHAS.Pt is a poor historian  HPI    Nimo Chong is a 65 year old male  with a complicated urological history other past medical history significant for BPH following which had Rosendo Kirti resolution, given by a periurethral abscess and then fistula. This was completed by acute kidney injury and was discharged with a   Ervin catheter that was removed in August. He developed hematuria with dysuria in mid November and a CT urogram done showed a mid left ureteral stone with new bilateral hydronephrosis. He presented to [facility] today due to flank pain and dysuria.He was transferred to [facility] because the [facility] was full.On presentation here he does not answer my questions but complaints of flank pain.CT obtained showed bilateral hydronephrosis and a left mid ureteral stone.He is being taken to the OR for cystoscopy and stent placement    PAST MEDICAL & SURGICAL HISTORY    Past Medical History:   Diagnosis Date   • Diabetes mellitus (CMS/HCC)    • Pneumonia    • Thyroid condition    • Urinary incontinence    • Urinary tract infection      Past Surgical History:   Procedure Laterality Date   • Laser surgery of prostate  03/02/2018   • Testicle surgery  03/2018    periuretheral abscess and fistula I and D   • Thyroid surgery  01/2018       CURRENT MEDICATIONS    Medications Prior to Admission   Medication Sig Dispense Refill   • docusate sodium (COLACE) 100 MG capsule Take 100 mg by mouth daily.     • glipiZIDE (GLUCOTROL) 5 MG tablet Take 2.5 mg by mouth daily (before breakfast).      • cholecalciferol (VITAMIN D3) 1000 UNITS tablet Take 1,000 Units by mouth daily.      • cyanocobalamin (VITAMIN B-12) 500 MCG tablet Take 500 mcg by mouth daily.     • pregabalin (LYRICA) 100 MG capsule Take 100 mg by mouth 2 times daily.      • insulin detemir (LEVEMIR) 100 UNIT/ML injectable solution Inject 12 Units into the skin nightly.     • Ascorbic Acid (VITAMIN C) 500 MG tablet Take 500 mg by mouth daily.      • magnesium hydroxide ([employer] MILK OF MAGNESIA) 400 MG/5ML suspension Take by mouth daily as needed for Constipation.     • tamsulosin (FLOMAX) 0.4 MG Cap Take 0.8 mg by mouth daily after a meal.     • atorvastatin (LIPITOR) 10 MG tablet Take 10 mg by mouth daily.     • nystatin (MYCOSTATIN) 637864 UNIT/GM powder Apply topically 2 times daily.     • finasteride (PROSCAR) 5 MG tablet Take 5 mg by mouth daily.     • ferrous sulfate 325 (65 FE) MG tablet Take 325 mg by mouth daily (with breakfast).     • Multiple Vitamins-Minerals (VITAMIN - THERAPEUTIC MULTIVITAMINS W/MINERALS) Tab Take 1 tablet by mouth daily.     • mirabegron ER (MYRBETRIQ) 25 MG 24 hour tablet Take 25 mg by mouth daily.     • methenamine (HIPREX) 1 g tablet Take 1 g by mouth 2 times daily (with meals).     • oxybutynin (DITROPAN) 5 MG tablet Take 5 mg by mouth 2 times daily.     • morphine, IMM REL, (MSIR) 15 MG tablet Take 15 mg by mouth daily.     • triamcinolone (ARISTOCORT) 0.1 % cream Apply topically as needed.        ALLERGIES    ALLERGIES:   Allergen Reactions   • Gabapentin Other (See Comments)     Feels like legs are on fire   • Metformin Other (See Comments)     Felt like legs were on fire.      SOCIAL HISTORY    Social History     Socioeconomic History   • Marital status: Single     Spouse name: Not on file   • Number of children: Not on file   • Years of education: Not on file   • Highest education level: Not on file   Social Needs   • Financial resource strain: Not on file   • Food insecurity - worry: Not on file   • Food insecurity - inability: Not on file   • Transportation needs - medical: Not on file  seen by trauma residents, 1 staple placed to L posterior head, clean dressing placed, vitals stable, dry blood on neck and back cleaned up, neuro exam unchanged: intact, pvt aide present, repeat head CT at 2330, dispo pending   • Transportation needs - non-medical: Not on file   Occupational History   • Not on file   Tobacco Use   • Smoking status: Current Every Day Smoker     Packs/day: 1.00     Years: 45.00     Pack years: 45.00     Types: Cigarettes   • Smokeless tobacco: Never Used   Substance and Sexual Activity   • Alcohol use: No     Frequency: Never   • Drug use: No   • Sexual activity: Not on file   Other Topics Concern   • Not on file   Social History Narrative   • Not on file     FAMILY HISTORY    HTN  REVIEW OF SYSTEMS    A complete ROS was done.Pertinent positives and negatives as documented in HPI    PHYSICAL EXAMINATION    Vital 24 Hour Range Most Recent Value   Temperature Temp  Min: 99.2 °F (37.3 °C)  Max: 99.2 °F (37.3 °C) 99.2 °F (37.3 °C)   Pulse Pulse  Min: 86  Max: 86 86   Respiratory Resp  Min: 25  Max: 25 25   Blood Pressure BP  Min: 143/69  Max: 143/69 143/69   Pulse Oximetry SpO2  Min: 96 %  Max: 96 % 96 %   General Appearance:  Alert and oriented to person, place and time. In distress due to pain  Head:  Normocephalic, without obvious abnormality, atraumatic.  Eyes:  Pupils equal, round, reactive to light, conjunctivae/corneas clear, extraocular movements intact, fundi benign, both eyes.  Ears:  Normal tympanic membranes and external ear canals, both ears.  Nose:  Nares normal, septum midline, mucosa normal, no drainage or sinus tenderness.  Neck:  Supple, symmetrical, trachea midline, no adenopathy.  Thyroid:  No enlargement/tenderness/nodules.  No carotid bruit or jugular venous distention.  Back:  Symmetric, no curvature, range of motion normal, no costovertebral angle  Tenderness.  Lungs:  Clear to auscultation bilaterally, respirations unlabored.  Chest Wall:  No tenderness or deformity.  Heart:  Regular rate and rhythm, S1 and S2 normal, no murmur, rub or gallop.  Abdomen:  Soft, non-tender, bowel sounds active all four quadrants, no masses, no hepatasplenomegly.  Genitalia:  Hypospadias with opening  draining mucus in the middle of the penis  Extremities:  Normal range of motion, normal strength, tone, stability and palpation to all four extremities.  Atraumatic, no cyanosis or edema.  Pulses: Pedal pulses 2+ and symmetric all extremities.  Skin:  Color, texture, turgor normal.  No rashes or lesions.  Neurologic:  Cranial nerves II-XII intact, normal strength, sensation and reflexes throughout.    Labs    Recent Labs   Lab 12/12/18  1819   SODIUM 131*   POTASSIUM 5.1   CHLORIDE 98   CO2 22   BUN 62*   CREATININE 1.66*   GLUCOSE 251*   WBC 13.5*   HGB 10.0*   HCT 29.9*        No results found  No results found for this or any previous visit.   No results found for this or any previous visit.  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  · SUHAS- due to obstructive uropathy.Hopefully CR starts to trend down . Will hydrate with normal saline  · UTI- pt will be started on IV zosyn.Follow urine culture results  · HTN- Bp is controlled  · Urethrocutaneous Fistula- Will follow up with his urologists in [place]  · Hyperglycemia- will place on SSI        · DVT Prophylaxis-Lovenox  · Disposition- Inpatient    GOALS OF CARE :     Goals of care were discussed with the patient and family which included but not limited to anticipated treatment course during the current hospitalization, recovery from current event, discharge planning and transitions  of care responsibilities and expected outcomes. Code Status was addressed on admission as well.  End of life care discussion done.    Is the patient expected to require a two midnight stay in the hospital? Yes I certify that I expect inpatient services for greater than two midnights are medically necessary for this patient. Please see H&P and MD Progress Notes for additional information about the patient's course of treatment.    Hitesh Rivero MD  12/12/2018  6:46 PM

## 2020-01-20 ENCOUNTER — INPATIENT (INPATIENT)
Facility: HOSPITAL | Age: 85
LOS: 10 days | Discharge: NURSING, REHAB OR LONG-TERM CARE | End: 2020-01-31
Attending: HOSPITALIST | Admitting: HOSPITALIST
Payer: MEDICARE

## 2020-01-20 VITALS
OXYGEN SATURATION: 98 % | SYSTOLIC BLOOD PRESSURE: 169 MMHG | HEART RATE: 65 BPM | DIASTOLIC BLOOD PRESSURE: 76 MMHG | TEMPERATURE: 98 F | RESPIRATION RATE: 17 BRPM

## 2020-01-20 DIAGNOSIS — Z95.5 PRESENCE OF CORONARY ANGIOPLASTY IMPLANT AND GRAFT: Chronic | ICD-10-CM

## 2020-01-20 DIAGNOSIS — I77.71 DISSECTION OF CAROTID ARTERY: ICD-10-CM

## 2020-01-20 PROBLEM — F03.90 UNSPECIFIED DEMENTIA WITHOUT BEHAVIORAL DISTURBANCE: Chronic | Status: ACTIVE | Noted: 2018-10-16

## 2020-01-20 LAB
ALBUMIN SERPL ELPH-MCNC: 3.9 G/DL — SIGNIFICANT CHANGE UP (ref 3.3–5)
ALP SERPL-CCNC: 64 U/L — SIGNIFICANT CHANGE UP (ref 40–120)
ALT FLD-CCNC: 12 U/L — SIGNIFICANT CHANGE UP (ref 4–33)
ANION GAP SERPL CALC-SCNC: 17 MMO/L — HIGH (ref 7–14)
APPEARANCE UR: SIGNIFICANT CHANGE UP
AST SERPL-CCNC: 19 U/L — SIGNIFICANT CHANGE UP (ref 4–32)
BACTERIA # UR AUTO: SIGNIFICANT CHANGE UP
BASOPHILS # BLD AUTO: 0.03 K/UL — SIGNIFICANT CHANGE UP (ref 0–0.2)
BASOPHILS NFR BLD AUTO: 0.3 % — SIGNIFICANT CHANGE UP (ref 0–2)
BILIRUB SERPL-MCNC: 0.6 MG/DL — SIGNIFICANT CHANGE UP (ref 0.2–1.2)
BILIRUB UR-MCNC: NEGATIVE — SIGNIFICANT CHANGE UP
BLOOD UR QL VISUAL: SIGNIFICANT CHANGE UP
BUN SERPL-MCNC: 9 MG/DL — SIGNIFICANT CHANGE UP (ref 7–23)
CALCIUM SERPL-MCNC: 9.3 MG/DL — SIGNIFICANT CHANGE UP (ref 8.4–10.5)
CHLORIDE SERPL-SCNC: 96 MMOL/L — LOW (ref 98–107)
CO2 SERPL-SCNC: 26 MMOL/L — SIGNIFICANT CHANGE UP (ref 22–31)
COLOR SPEC: SIGNIFICANT CHANGE UP
CREAT SERPL-MCNC: 0.86 MG/DL — SIGNIFICANT CHANGE UP (ref 0.5–1.3)
EOSINOPHIL # BLD AUTO: 0.03 K/UL — SIGNIFICANT CHANGE UP (ref 0–0.5)
EOSINOPHIL NFR BLD AUTO: 0.3 % — SIGNIFICANT CHANGE UP (ref 0–6)
GLUCOSE SERPL-MCNC: 237 MG/DL — HIGH (ref 70–99)
GLUCOSE UR-MCNC: NEGATIVE — SIGNIFICANT CHANGE UP
HCT VFR BLD CALC: 40.9 % — SIGNIFICANT CHANGE UP (ref 34.5–45)
HGB BLD-MCNC: 13.3 G/DL — SIGNIFICANT CHANGE UP (ref 11.5–15.5)
HYALINE CASTS # UR AUTO: NEGATIVE — SIGNIFICANT CHANGE UP
IMM GRANULOCYTES NFR BLD AUTO: 0.3 % — SIGNIFICANT CHANGE UP (ref 0–1.5)
KETONES UR-MCNC: NEGATIVE — SIGNIFICANT CHANGE UP
LEUKOCYTE ESTERASE UR-ACNC: SIGNIFICANT CHANGE UP
LYMPHOCYTES # BLD AUTO: 0.99 K/UL — LOW (ref 1–3.3)
LYMPHOCYTES # BLD AUTO: 10.5 % — LOW (ref 13–44)
MCHC RBC-ENTMCNC: 30.3 PG — SIGNIFICANT CHANGE UP (ref 27–34)
MCHC RBC-ENTMCNC: 32.5 % — SIGNIFICANT CHANGE UP (ref 32–36)
MCV RBC AUTO: 93.2 FL — SIGNIFICANT CHANGE UP (ref 80–100)
MONOCYTES # BLD AUTO: 0.52 K/UL — SIGNIFICANT CHANGE UP (ref 0–0.9)
MONOCYTES NFR BLD AUTO: 5.5 % — SIGNIFICANT CHANGE UP (ref 2–14)
NEUTROPHILS # BLD AUTO: 7.85 K/UL — HIGH (ref 1.8–7.4)
NEUTROPHILS NFR BLD AUTO: 83.1 % — HIGH (ref 43–77)
NITRITE UR-MCNC: NEGATIVE — SIGNIFICANT CHANGE UP
NRBC # FLD: 0 K/UL — SIGNIFICANT CHANGE UP (ref 0–0)
PH UR: 7.5 — SIGNIFICANT CHANGE UP (ref 5–8)
PLATELET # BLD AUTO: 207 K/UL — SIGNIFICANT CHANGE UP (ref 150–400)
PMV BLD: 10 FL — SIGNIFICANT CHANGE UP (ref 7–13)
POTASSIUM SERPL-MCNC: 3.8 MMOL/L — SIGNIFICANT CHANGE UP (ref 3.5–5.3)
POTASSIUM SERPL-SCNC: 3.8 MMOL/L — SIGNIFICANT CHANGE UP (ref 3.5–5.3)
PROT SERPL-MCNC: 6.6 G/DL — SIGNIFICANT CHANGE UP (ref 6–8.3)
PROT UR-MCNC: 10 — SIGNIFICANT CHANGE UP
RBC # BLD: 4.39 M/UL — SIGNIFICANT CHANGE UP (ref 3.8–5.2)
RBC # FLD: 13.6 % — SIGNIFICANT CHANGE UP (ref 10.3–14.5)
RBC CASTS # UR COMP ASSIST: SIGNIFICANT CHANGE UP (ref 0–?)
SODIUM SERPL-SCNC: 139 MMOL/L — SIGNIFICANT CHANGE UP (ref 135–145)
SP GR SPEC: 1.04 — SIGNIFICANT CHANGE UP (ref 1–1.04)
SQUAMOUS # UR AUTO: SIGNIFICANT CHANGE UP
TROPONIN T, HIGH SENSITIVITY: 35 NG/L — SIGNIFICANT CHANGE UP (ref ?–14)
UROBILINOGEN FLD QL: NORMAL — SIGNIFICANT CHANGE UP
WBC # BLD: 9.45 K/UL — SIGNIFICANT CHANGE UP (ref 3.8–10.5)
WBC # FLD AUTO: 9.45 K/UL — SIGNIFICANT CHANGE UP (ref 3.8–10.5)
WBC UR QL: >50 — HIGH (ref 0–?)

## 2020-01-20 PROCEDURE — 70498 CT ANGIOGRAPHY NECK: CPT | Mod: 26

## 2020-01-20 PROCEDURE — 70496 CT ANGIOGRAPHY HEAD: CPT | Mod: 26

## 2020-01-20 RX ORDER — CLOPIDOGREL BISULFATE 75 MG/1
75 TABLET, FILM COATED ORAL ONCE
Refills: 0 | Status: COMPLETED | OUTPATIENT
Start: 2020-01-20 | End: 2020-01-20

## 2020-01-20 RX ORDER — SODIUM CHLORIDE 9 MG/ML
1000 INJECTION, SOLUTION INTRAVENOUS ONCE
Refills: 0 | Status: COMPLETED | OUTPATIENT
Start: 2020-01-20 | End: 2020-01-20

## 2020-01-20 RX ORDER — ASPIRIN/CALCIUM CARB/MAGNESIUM 324 MG
81 TABLET ORAL ONCE
Refills: 0 | Status: COMPLETED | OUTPATIENT
Start: 2020-01-20 | End: 2020-01-20

## 2020-01-20 RX ORDER — MECLIZINE HCL 12.5 MG
25 TABLET ORAL ONCE
Refills: 0 | Status: COMPLETED | OUTPATIENT
Start: 2020-01-20 | End: 2020-01-20

## 2020-01-20 RX ADMIN — Medication 25 MILLIGRAM(S): at 13:51

## 2020-01-20 RX ADMIN — Medication 81 MILLIGRAM(S): at 20:43

## 2020-01-20 RX ADMIN — SODIUM CHLORIDE 1000 MILLILITER(S): 9 INJECTION, SOLUTION INTRAVENOUS at 13:51

## 2020-01-20 RX ADMIN — CLOPIDOGREL BISULFATE 75 MILLIGRAM(S): 75 TABLET, FILM COATED ORAL at 20:43

## 2020-01-20 NOTE — CONSULT NOTE ADULT - ASSESSMENT
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 No neurosurgical intervention

## 2020-01-20 NOTE — CONSULT NOTE ADULT - ATTENDING COMMENTS
89-year-old right-handed lady first evaluated at  [facility] on 1/21/20 with "dizziness". History and exam as above with minor changes. ROS otherwise negative. Exam. Alert and attentive; speech fluent, prosodic, with  ? Occasional semantic paraphasias; said that her age was "89"; year "2021"; she was unable to name the president; 5-6 beats of gaze evoked horizontal nystagmus bilaterally; mild-moderate dysarthria; gait not tested; remainder of neurologic exam was nonfocal CT head (1/20/20) to my eye is unremarkable. CTA neck and head (1/20/20) to my eye showed a right CCA dissection with an intimal flap but no significant stenosis. Impression. On 1/20/20, while on the toilet, she developed "dizziness", but it was virtually impossible to ascertain the nature of this symptom, whether light headedness, near syncope, vertigo, or some other unusual symptom. She also has evidence of at least mild cognitive impairment. Workup showed right CCA dissection without significant stenosis and no clear evidence of infarction on CT, suggesting that the right CCA dissection might be an incidental finding, which should nonetheless be confirmed. Suggest. MRI brain/MRA neck and head/MRI neck T1-weighted fat sat; elective TTE as inpatient or outpatient; continue Plavix 75 mg daily for 3 weeks; aspirin 81 mg daily-indefinitely (unless she has some contraindication to aspirin); atorvastatin 80 mg daily-titrate dose depending on LDL; PT/OT/speech therapy; from neurovascular standpoint, suspect that she will benefit from early discharge.
Discussed and agreed with above.

## 2020-01-20 NOTE — ED PROVIDER NOTE - PHYSICAL EXAMINATION
Gen: AAOx3, non-toxic  Head: NCAT  HEENT: EOMI, oral mucosa moist, normal conjunctiva  Lung: CTAB, no respiratory distress, no wheezes/rhonchi/rales B/L, speaking in full sentences  CV: RRR, no murmurs, rubs or gallops  Abd: soft, NTND, no guarding, no CVA tenderness  MSK: no visible deformities  Neuro: No focal sensory or motor deficits, normal CN exam, normal pritesh to noise test. Ambulating with normal gait, but holding on to something. Horizontal nystagmus in both directions.   Skin: Warm, well perfused, no rash  Psych: normal affect.

## 2020-01-20 NOTE — CONSULT NOTE ADULT - SUBJECTIVE AND OBJECTIVE BOX
89 yo F with htn, hld, dm, cad sp stent presents with dizziness this morning. Reports urinating on the bathroom when she felt the room spinning, lasted less than a minute. Also reported sensation of passing out, which completely resolved. However she still reports room spinning sensation that gets worse with head movement. Denies any headache, ear pain or tinitus. Denies leg or arm weakness or paresthesias. Denies chest pain, SOB, vision changes, diaphoresis, or abdominal pain.    PAST MEDICAL & SURGICAL HISTORY:  Dementia  CAD (coronary artery disease)  DM (diabetes mellitus)  HLD (hyperlipidemia)  HTN (hypertension)  S/P coronary artery stent placement    Home Medications:  acetaminophen 325 mg oral tablet: 2 tab(s) orally every 6 hours, As needed, Mild Pain (1 - 3), Moderate Pain (4 - 6) (25 Oct 2018 12:43)  aspirin 81 mg oral tablet: 1 tab(s) orally once a day (16 Oct 2018 16:42)  glipiZIDE 5 mg oral tablet: 1 tab(s) orally 2 times a day (16 Oct 2018 16:43)  lovastatin 10 mg oral tablet: 1 tab(s) orally once a day (16 Oct 2018 16:43)  Namenda XR 28 mg oral capsule, extended release: 1 cap(s) orally once a day (16 Oct 2018 16:43)  Plavix 75 mg oral tablet: 1 tab(s) orally once a day (16 Oct 2018 16:43)  timolol hemihydrate 0.5% ophthalmic solution: 1 drop(s) to each affected eye 2 times a day (16 Oct 2018 16:44)    AAO X 3  PERRLA, EOMI  CN 2-12 grossly intact  HAY strength 5/5  No drift  SILT    < from: CT Angio Head w/ IV Cont (01.20.20 @ 17:27) >  CT BRAIN:    There is no CT evidence of acute intracranial hemorrhage, extra-axial collection, vasogenic edema, mass effect, midline shift, central herniation, hydrocephalus or acute territorial infarct.     No areas of abnormal enhancement are identified.    Moderate patchy hypodensity without mass effect is noted in the periventricular white matter which most likely represents chronic microvascular ischemic changes given the patient's age.    Cerebral volume loss is present with secondary proportional prominence of the sulci and ventricles.     Complete opacification of the right maxillary and sphenoid sinuses with bony wall thickening consistent with chronic sinusitis. The remaining visualized paranasal sinuses are clear. The mastoid air cells and middle ear cavities are clear.    The soft tissues of the scalp are unremarkable. The calvarium is intact.    CT ANGIOGRAPHY NECK:    Three-vessel aortic arch. Atherosclerotic calcifications of the aorta. The origins of the great vessels are unremarkable.     A dissection flap is noted involving the distal right common carotid artery, which extends into the proximal right internal carotid artery and proximal right external carotid artery. There is no associatedvascular narrowing. Calcified plaques are noted at the right carotid bifurcation without significant stenosis by NASCET criteria.    Calcified plaques involve the origin of the left internal carotid artery with an approximate 20% stenosis via NASCET criteria.  No significant common carotid artery stenosis is present.    The vertebral arteries are without focal stenosis. Mild calcification involves the origin of the left vertebral artery without associated stenosis.    Co-dominant vertebral arteries. The vertebral arteries are normal in caliber without significant stenosis.     The visualized neck and upper chest are unremarkable.    Visualized osseous structures are unremarkable.    CT ANGIOGRAPHY BRAIN:    There is no evidence for significant stenosis, major vessel occlusion, or aneurysm about the [place].    There is no evidence for significant stenosis, major vessel occlusion, or aneurysm involving the vertebrobasilar system.    No enlarged vascular lesions or clusters of abnormal vessels are noted to suggest an arterial venous malformation.    Visualized portions of the superficial and deep venous systems are unremarkable.    I discussed the exam findings with Dr. Herndon at 6:55 PM on 01/20/2020 with read back.    IMPRESSION:     CT brain: No acute intracranial abnormality is noted. If the patient remains symptomatic, consider short interval follow-up head CT or brain MRI follow-up. Chronic right maxillary and sphenoid sinusitis.    CT angiography neck: A short segment dissection involves the distal right common carotid artery with extension to the proximal internal and external carotid arteries. There is no associated significant vascular narrowing. Serial imaging follow-up over time is recommended.    CT angiography brain: No evidence for significant stenosis or major vessel occlusion about the [place].    < end of copied text >

## 2020-01-20 NOTE — ED PROVIDER NOTE - CLINICAL SUMMARY MEDICAL DECISION MAKING FREE TEXT BOX
89 yo F with htn, hld, dm, cad sp stent presents with dizziness this morning. Vitals WNL. No focal sensory or motor deficits, normal CN exam, normal pritesh to noise test. Ambulating with normal gait, but holding on to something. Horizontal nystagmus in both direction. Likely peripheral vertigo however ACS needs to be rule out given risk factors

## 2020-01-20 NOTE — ED ADULT NURSE NOTE - INTERVENTIONS DEFINITIONS
Provide visual clues: red socks/[place] to call system/Instruct patient to call for assistance/Stretcher in lowest position, wheels locked, appropriate side rails in place/Monitor gait and stability/Reinforce activity limits and safety measures with patient and family/Room bathroom lighting operational/Non-slip footwear when patient is off stretcher/Physically safe environment: no spills, clutter or unnecessary equipment/Call bell, personal items and telephone within reach/Monitor for mental status changes and reorient to person, place, and time/Review medications for side effects contributing to fall risk

## 2020-01-20 NOTE — ED PROVIDER NOTE - NS ED ROS FT
GENERAL: No fever or chills  EYES: no change in vision  HEENT: no trouble swallowing or speaking  CARDIAC: no chest pain or palpitations   PULMONARY: no cough or SOB  GI: no abdominal pain, nausea, vomiting, diarrhea, or constipation   : No changes in urination  SKIN: no rashes  NEURO: see hpi  MSK: No joint pain

## 2020-01-20 NOTE — ED PROVIDER NOTE - ATTENDING CONTRIBUTION TO CARE
Patient is a 89 yo F with history of DM, HTN, hyperlipidemia, CAD with 2 stents here for episode of dizziness and near syncope. She was on toilet and urinating and felt sudden onset of dizziness/ room spinning. She felt she was going to pass out. This resolved but she dizziness persisted. Denies chest pain, sob, diaphoresis. No abdominal pain, urinary symptoms. At baseline, walks with a walker. No history of vertigo.     VS noted  Gen. no acute distress, Non toxic   HEENT: EOMI, mmm, nystagmus b/l  Lungs: CTAB/L no C/ W /R   CVS: RRR   Abd; Soft non tender, non distended   Ext: no edema  Skin: no rash  Neuro AAOx3, CN 2-12 intact, finger to nose intact, non focal clear speech  a/p: dizziness/ near syncope - ekg/ labs including trop/ CTA H&N.   - Junior GÓMEZ Patient is a 89 yo F with history of DM, HTN, hyperlipidemia, CAD with 2 stents here for episode of dizziness and near syncope. She was on toilet and urinating and felt sudden onset of dizziness/ room spinning. She felt she was going to pass out. This resolved but she dizziness persisted. Denies chest pain, sob, diaphoresis. No abdominal pain, urinary symptoms. At baseline, walks with a walker. No history of vertigo.     VS noted  Gen. no acute distress, Non toxic   HEENT: EOMI, mmm, nystagmus b/l  Lungs: CTAB/L no C/ W /R   CVS: RRR   Abd; Soft non tender, non distended   Ext: no edema  Skin: no rash  Neuro AAOx3, CN 2-12 intact, finger to nose intact, non focal clear speech  a/p: dizziness/ near syncope - ekg/ labs including trop/ CTA H&N. Patient denies headache, neck pain, chest pain. plan to r/o ACS, CVA.   - Junior GÓMEZ

## 2020-01-20 NOTE — CONSULT NOTE ADULT - SUBJECTIVE AND OBJECTIVE BOX
HPI:    89 RHF w/ PMH of HTN, HLD, DM, CAD s/p stent placement presented w/ dizziness on the morning of 1/20.     Patient states she felt "dizzy" after getting up off the toilet this morning. States she felt "light-headaed", although she told ED staff that it was "room-spinning" that worse with head movements. Denies changes in speech, vision, hearing, swallowing, voice quality, numbness/tingling, weakness, balance/room-spinning sensations. No previous history of CVA/TIA, seizures, migraines. No history of arrythmia. No AC.     Patient states her dizziness has resolved and she wants to go home. Denies any fevers, chills, night sweats. Denies and sudden jerking neck movements or accidents. No trauma. Denies any neck pain, claudication.     (Stroke only)  NIHSS: 0    A 10-system ROS was performed and is negative except for those items noted above and/or in the HPI.    PAST MEDICAL & SURGICAL HISTORY:  Dementia  CAD (coronary artery disease)  DM (diabetes mellitus)  HLD (hyperlipidemia)  HTN (hypertension)  S/P coronary artery stent placement    FAMILY HISTORY:  No pertinent family history in first degree relatives    SOCIAL HISTORY:   T/E/D: No smoke, drink, drugs     MEDICATIONS (HOME):  Home Medications:  acetaminophen 325 mg oral tablet: 2 tab(s) orally every 6 hours, As needed, Mild Pain (1 - 3), Moderate Pain (4 - 6) (25 Oct 2018 12:43)  aspirin 81 mg oral tablet: 1 tab(s) orally once a day (16 Oct 2018 16:42)  glipiZIDE 5 mg oral tablet: 1 tab(s) orally 2 times a day (16 Oct 2018 16:43)  lovastatin 10 mg oral tablet: 1 tab(s) orally once a day (16 Oct 2018 16:43)  Namenda XR 28 mg oral capsule, extended release: 1 cap(s) orally once a day (16 Oct 2018 16:43)  Plavix 75 mg oral tablet: 1 tab(s) orally once a day (16 Oct 2018 16:43)  timolol hemihydrate 0.5% ophthalmic solution: 1 drop(s) to each affected eye 2 times a day (16 Oct 2018 16:44)    Exam:   MS: Alert and oriented to self, place and time. Attentive. Language fluent w/ intact comprehension and repetition. No extinction on double simultaneous stimulation   CN: VFF. SUSAN. Left beating nystagmus on left gaze, right beating nystagmus on right gaze (gaze evoked), no nystagmus on upward or downward gaze. V1-V3 intact. Face symmetric. T/u midline. Normal shrug.   Motor: Normal tone. No drift. Full strength throughout  Reflexes: 2+ throughout. Babinski absent bilaterally.   Sensation: Intact to LT throughout   Coordination: No dysmetria on FNF or H2S bilaterally   Gait: Deferred     STUDIES & IMAGING:  UA +WBCs, LE  CTH 1/20/2020: Negative.   CTA Head/Neck 1/20/2020: short segment dissection of distal R. CCA w/ extension to the proximal internal and external carotid arteries. No associated significant vascular narrowing.

## 2020-01-20 NOTE — ED PROVIDER NOTE - PROGRESS NOTE DETAILS
Spoke with neurosurgery. C/w ASA and Plavix. Per recs, consulted neurology. Neurology to follow up with patient. (Alexander Ortiz, PGY-2) patient seen by neurology and neurosurgery. recommending observation and non-con MRI and starting aspirin. - resident Giuliano Ortez

## 2020-01-20 NOTE — ED ADULT NURSE NOTE - OBJECTIVE STATEMENT
Pt presents to ED with lightheadedness and dizziness. pt states this morning she was using the restroom to urinate when she was sitting on the toilet and felt like the room was spinning. Pt states the episode lasted for a few minutes. pt states at this time she has some lightheadedness. pt denies chest pain, pt NSR on the monitor. pt denies shortness of breath, breathing even and unlabored. pt denies abd pain, n/v/d. pt denies dysuria and hematuria. Blanchable redness noted to bilateral buttocks. 20g IVL placed in the R arm. Will continue to monitor.

## 2020-01-20 NOTE — CONSULT NOTE ADULT - ASSESSMENT
89 RHF w/ PMH of HTN, HLD, DM, CAD s/p stent placement presented w/ dizziness on the morning of 1/20. Denies room-spinning sensation on my examination. Exam demonstrates gaze evoked nystagmus, otherwise non-focal exam. CTH negative.   CTA demonstrates short segment dissection of distal R. CCA w/ extension to the proximal internal and external carotid arteries. No associated significant vascular narrowing.     Impression: Lightheadedness and gaze evoked nystagmus of unclear etiology in patient w/ extracranial carotid dissection. The lightheadedness is likely orthostatic/cardiac w/ low suspicion for neurologic etiology. The gaze evoked nystagmus is not associated with any other signs of brainstem or cerebellar dysfunction, and is likely to be a chronic asymptomatic finding (possibly due to dementia?). Patient is not demonstrating any signs of ischemia from the common carotid dissection but should be monitored given the high risk of stroke in the hyperacute setting.     Plan:   [] Admit to [facility] for monitoring  [] q4 neurocheck  [] MRI head w/o contrast (if able)  [] MRA neck w/ contrast w/ fat sats  [] MRA head w/o contrast  [] Continue home Plavix for cardiac stents  [] Can start ASA in the hyperacute setting  [] No indication for anticoagulation given the extracranial location of the dissection   [] Please call neurology STAT for any change in neurological status 89 RHF w/ PMH of HTN, HLD, DM, CAD s/p stent placement presented w/ dizziness on the morning of 1/20. Denies room-spinning sensation on my examination. Exam demonstrates gaze evoked nystagmus, otherwise non-focal exam. CTH negative.   CTA demonstrates short segment dissection of distal R. CCA w/ extension to the proximal internal and external carotid arteries. No associated significant vascular narrowing.     Impression: Lightheadedness and gaze evoked nystagmus of unclear etiology in patient w/ extracranial carotid dissection. The lightheadedness is likely orthostatic/cardiac w/ low suspicion for neurologic etiology. The gaze evoked nystagmus is not associated with any other signs of brainstem or cerebellar dysfunction, and is likely to be a chronic asymptomatic finding (possibly due to dementia?). Patient is not demonstrating any signs of ischemia from the common carotid dissection but should be monitored given the high risk of stroke in the hyperacute setting.     Plan:   [] Admit to [facility] for monitoring  [] q4 neurocheck  [] MRI head w/o contrast (if able)  [] MRA neck w/ contrast w/ fat sats  [] MRA head w/o contrast  [] Continue home Plavix for cardiac stents. Can start ASA in the hyperacute setting of carotid dissection for primary stroke prevention. Decision to continue DAPT to be made by stroke team tomorrow.   [] No indication for anticoagulation given the extracranial location of the dissection   [] Please call neurology STAT for any change in neurological status

## 2020-01-20 NOTE — ED PROVIDER NOTE - OBJECTIVE STATEMENT
89 yo F with htn, hld, dm, cad sp stent presents with dizziness this morning. Reports urinating on the bathroom when she felt the room spinning, lasted less than a minute. Also reported sensation of passing out, which completely resolved. However she still reports room spinning sensation that gets worse with head movement. Denies any headache, ear pain or tinitus. Denies leg or arm weakness or paresthesias. Denies chest pain, SOB, vision changes, diaphoresis, or abdominal pain. 89 yo F with htn, hld, dm, cad sp stent presents with dizziness this morning. Reports urinating on the bathroom when she felt the room spinning, lasted less than a minute. Also reported sensation of passing out, which completely resolved. However she still reports room spinning sensation that gets worse with head movement. Denies any headache, ear pain or tinitus. Denies leg or arm weakness or paresthesias. Denies chest pain, SOB, vision changes, diaphoresis, or abdominal pain.  PCP Akhil Anthony  Cardiologist Dr Fransisco Sanford

## 2020-01-20 NOTE — ED ADULT NURSE REASSESSMENT NOTE - NS ED NURSE REASSESS COMMENT FT1
Received report from day shift RN Jay. Pt A&Ox1. Pt laying in stretcher comfortably. Pt taken in wheelchair to the bathroom, denies any pain, sob, n & v, diarrhea, fever, dizziness at this time. Vitals as noted. Pt noted to be hypertensive, MD Fair made aware. Respirations are equal and nonlabored, no respiratory distress noted. Pt medicated as per orders. stable, awaiting further plan. Will continue to monitor

## 2020-01-21 DIAGNOSIS — R94.31 ABNORMAL ELECTROCARDIOGRAM [ECG] [EKG]: ICD-10-CM

## 2020-01-21 DIAGNOSIS — Z96.643 PRESENCE OF ARTIFICIAL HIP JOINT, BILATERAL: Chronic | ICD-10-CM

## 2020-01-21 DIAGNOSIS — Z79.899 OTHER LONG TERM (CURRENT) DRUG THERAPY: ICD-10-CM

## 2020-01-21 DIAGNOSIS — E11.65 TYPE 2 DIABETES MELLITUS WITH HYPERGLYCEMIA: ICD-10-CM

## 2020-01-21 DIAGNOSIS — E86.0 DEHYDRATION: ICD-10-CM

## 2020-01-21 DIAGNOSIS — Z02.9 ENCOUNTER FOR ADMINISTRATIVE EXAMINATIONS, UNSPECIFIED: ICD-10-CM

## 2020-01-21 DIAGNOSIS — I10 ESSENTIAL (PRIMARY) HYPERTENSION: ICD-10-CM

## 2020-01-21 DIAGNOSIS — N30.00 ACUTE CYSTITIS WITHOUT HEMATURIA: ICD-10-CM

## 2020-01-21 LAB
24R-OH-CALCIDIOL SERPL-MCNC: 46.4 NG/ML — SIGNIFICANT CHANGE UP (ref 30–80)
ANION GAP SERPL CALC-SCNC: 15 MMO/L — HIGH (ref 7–14)
BASOPHILS # BLD AUTO: 0.03 K/UL — SIGNIFICANT CHANGE UP (ref 0–0.2)
BASOPHILS NFR BLD AUTO: 0.2 % — SIGNIFICANT CHANGE UP (ref 0–2)
BUN SERPL-MCNC: 6 MG/DL — LOW (ref 7–23)
CALCIUM SERPL-MCNC: 9.1 MG/DL — SIGNIFICANT CHANGE UP (ref 8.4–10.5)
CHLORIDE SERPL-SCNC: 97 MMOL/L — LOW (ref 98–107)
CO2 SERPL-SCNC: 27 MMOL/L — SIGNIFICANT CHANGE UP (ref 22–31)
CREAT SERPL-MCNC: 0.76 MG/DL — SIGNIFICANT CHANGE UP (ref 0.5–1.3)
EOSINOPHIL # BLD AUTO: 0.07 K/UL — SIGNIFICANT CHANGE UP (ref 0–0.5)
EOSINOPHIL NFR BLD AUTO: 0.6 % — SIGNIFICANT CHANGE UP (ref 0–6)
GLUCOSE BLDC GLUCOMTR-MCNC: 139 MG/DL — HIGH (ref 70–99)
GLUCOSE BLDC GLUCOMTR-MCNC: 156 MG/DL — HIGH (ref 70–99)
GLUCOSE SERPL-MCNC: 144 MG/DL — HIGH (ref 70–99)
HBA1C BLD-MCNC: 7.6 % — HIGH (ref 4–5.6)
HCT VFR BLD CALC: 38.5 % — SIGNIFICANT CHANGE UP (ref 34.5–45)
HGB BLD-MCNC: 12.6 G/DL — SIGNIFICANT CHANGE UP (ref 11.5–15.5)
IMM GRANULOCYTES NFR BLD AUTO: 0.4 % — SIGNIFICANT CHANGE UP (ref 0–1.5)
LYMPHOCYTES # BLD AUTO: 1.67 K/UL — SIGNIFICANT CHANGE UP (ref 1–3.3)
LYMPHOCYTES # BLD AUTO: 13.4 % — SIGNIFICANT CHANGE UP (ref 13–44)
MAGNESIUM SERPL-MCNC: 1.2 MG/DL — LOW (ref 1.6–2.6)
MCHC RBC-ENTMCNC: 30.9 PG — SIGNIFICANT CHANGE UP (ref 27–34)
MCHC RBC-ENTMCNC: 32.7 % — SIGNIFICANT CHANGE UP (ref 32–36)
MCV RBC AUTO: 94.4 FL — SIGNIFICANT CHANGE UP (ref 80–100)
MONOCYTES # BLD AUTO: 0.88 K/UL — SIGNIFICANT CHANGE UP (ref 0–0.9)
MONOCYTES NFR BLD AUTO: 7.1 % — SIGNIFICANT CHANGE UP (ref 2–14)
NEUTROPHILS # BLD AUTO: 9.74 K/UL — HIGH (ref 1.8–7.4)
NEUTROPHILS NFR BLD AUTO: 78.3 % — HIGH (ref 43–77)
NRBC # FLD: 0 K/UL — SIGNIFICANT CHANGE UP (ref 0–0)
PHOSPHATE SERPL-MCNC: 2.5 MG/DL — SIGNIFICANT CHANGE UP (ref 2.5–4.5)
PLATELET # BLD AUTO: 209 K/UL — SIGNIFICANT CHANGE UP (ref 150–400)
PMV BLD: 10.2 FL — SIGNIFICANT CHANGE UP (ref 7–13)
POTASSIUM SERPL-MCNC: 3.3 MMOL/L — LOW (ref 3.5–5.3)
POTASSIUM SERPL-SCNC: 3.3 MMOL/L — LOW (ref 3.5–5.3)
RBC # BLD: 4.08 M/UL — SIGNIFICANT CHANGE UP (ref 3.8–5.2)
RBC # FLD: 13.7 % — SIGNIFICANT CHANGE UP (ref 10.3–14.5)
SODIUM SERPL-SCNC: 139 MMOL/L — SIGNIFICANT CHANGE UP (ref 135–145)
TSH SERPL-MCNC: 2.31 UIU/ML — SIGNIFICANT CHANGE UP (ref 0.27–4.2)
WBC # BLD: 12.44 K/UL — HIGH (ref 3.8–10.5)
WBC # FLD AUTO: 12.44 K/UL — HIGH (ref 3.8–10.5)

## 2020-01-21 PROCEDURE — 12345: CPT | Mod: NC

## 2020-01-21 PROCEDURE — 99223 1ST HOSP IP/OBS HIGH 75: CPT

## 2020-01-21 RX ORDER — CLOPIDOGREL BISULFATE 75 MG/1
75 TABLET, FILM COATED ORAL DAILY
Refills: 0 | Status: DISCONTINUED | OUTPATIENT
Start: 2020-01-21 | End: 2020-01-31

## 2020-01-21 RX ORDER — ASPIRIN/CALCIUM CARB/MAGNESIUM 324 MG
1 TABLET ORAL
Qty: 0 | Refills: 0 | DISCHARGE

## 2020-01-21 RX ORDER — DEXTROSE 50 % IN WATER 50 %
15 SYRINGE (ML) INTRAVENOUS ONCE
Refills: 0 | Status: DISCONTINUED | OUTPATIENT
Start: 2020-01-21 | End: 2020-01-31

## 2020-01-21 RX ORDER — POTASSIUM CHLORIDE 20 MEQ
40 PACKET (EA) ORAL ONCE
Refills: 0 | Status: COMPLETED | OUTPATIENT
Start: 2020-01-21 | End: 2020-01-21

## 2020-01-21 RX ORDER — DEXTROSE 50 % IN WATER 50 %
25 SYRINGE (ML) INTRAVENOUS ONCE
Refills: 0 | Status: DISCONTINUED | OUTPATIENT
Start: 2020-01-21 | End: 2020-01-31

## 2020-01-21 RX ORDER — DEXTROSE 50 % IN WATER 50 %
12.5 SYRINGE (ML) INTRAVENOUS ONCE
Refills: 0 | Status: DISCONTINUED | OUTPATIENT
Start: 2020-01-21 | End: 2020-01-31

## 2020-01-21 RX ORDER — INSULIN LISPRO 100/ML
VIAL (ML) SUBCUTANEOUS AT BEDTIME
Refills: 0 | Status: DISCONTINUED | OUTPATIENT
Start: 2020-01-21 | End: 2020-01-31

## 2020-01-21 RX ORDER — LOVASTATIN 20 MG
1 TABLET ORAL
Qty: 0 | Refills: 0 | DISCHARGE

## 2020-01-21 RX ORDER — TIMOLOL 0.5 %
1 DROPS OPHTHALMIC (EYE)
Qty: 0 | Refills: 0 | DISCHARGE

## 2020-01-21 RX ORDER — INSULIN LISPRO 100/ML
VIAL (ML) SUBCUTANEOUS
Refills: 0 | Status: DISCONTINUED | OUTPATIENT
Start: 2020-01-21 | End: 2020-01-31

## 2020-01-21 RX ORDER — LINAGLIPTIN 5 MG/1
1 TABLET, FILM COATED ORAL
Qty: 0 | Refills: 0 | DISCHARGE

## 2020-01-21 RX ORDER — AMLODIPINE BESYLATE 2.5 MG/1
5 TABLET ORAL DAILY
Refills: 0 | Status: DISCONTINUED | OUTPATIENT
Start: 2020-01-21 | End: 2020-01-24

## 2020-01-21 RX ORDER — LISINOPRIL 2.5 MG/1
20 TABLET ORAL ONCE
Refills: 0 | Status: COMPLETED | OUTPATIENT
Start: 2020-01-21 | End: 2020-01-21

## 2020-01-21 RX ORDER — ATORVASTATIN CALCIUM 80 MG/1
10 TABLET, FILM COATED ORAL AT BEDTIME
Refills: 0 | Status: DISCONTINUED | OUTPATIENT
Start: 2020-01-21 | End: 2020-01-31

## 2020-01-21 RX ORDER — CEFTRIAXONE 500 MG/1
1000 INJECTION, POWDER, FOR SOLUTION INTRAMUSCULAR; INTRAVENOUS EVERY 24 HOURS
Refills: 0 | Status: DISCONTINUED | OUTPATIENT
Start: 2020-01-21 | End: 2020-01-24

## 2020-01-21 RX ORDER — POTASSIUM CHLORIDE 20 MEQ
20 PACKET (EA) ORAL ONCE
Refills: 0 | Status: COMPLETED | OUTPATIENT
Start: 2020-01-21 | End: 2020-01-21

## 2020-01-21 RX ORDER — ROSUVASTATIN CALCIUM 5 MG/1
1 TABLET ORAL
Qty: 0 | Refills: 0 | DISCHARGE

## 2020-01-21 RX ORDER — METFORMIN HYDROCHLORIDE 850 MG/1
1 TABLET ORAL
Qty: 0 | Refills: 0 | DISCHARGE

## 2020-01-21 RX ORDER — SODIUM CHLORIDE 9 MG/ML
1000 INJECTION, SOLUTION INTRAVENOUS
Refills: 0 | Status: DISCONTINUED | OUTPATIENT
Start: 2020-01-21 | End: 2020-01-31

## 2020-01-21 RX ORDER — PETROLATUM,WHITE
1 JELLY (GRAM) TOPICAL THREE TIMES A DAY
Refills: 0 | Status: DISCONTINUED | OUTPATIENT
Start: 2020-01-21 | End: 2020-01-31

## 2020-01-21 RX ORDER — MEMANTINE HYDROCHLORIDE 10 MG/1
1 TABLET ORAL
Qty: 0 | Refills: 0 | DISCHARGE

## 2020-01-21 RX ORDER — LISINOPRIL 2.5 MG/1
20 TABLET ORAL DAILY
Refills: 0 | Status: DISCONTINUED | OUTPATIENT
Start: 2020-01-21 | End: 2020-01-24

## 2020-01-21 RX ORDER — SODIUM CHLORIDE 9 MG/ML
1000 INJECTION, SOLUTION INTRAVENOUS
Refills: 0 | Status: DISCONTINUED | OUTPATIENT
Start: 2020-01-21 | End: 2020-01-24

## 2020-01-21 RX ORDER — MAGNESIUM SULFATE 500 MG/ML
1 VIAL (ML) INJECTION ONCE
Refills: 0 | Status: COMPLETED | OUTPATIENT
Start: 2020-01-21 | End: 2020-01-21

## 2020-01-21 RX ORDER — TIMOLOL 0.5 %
1 DROPS OPHTHALMIC (EYE)
Refills: 0 | Status: DISCONTINUED | OUTPATIENT
Start: 2020-01-21 | End: 2020-01-31

## 2020-01-21 RX ORDER — GLUCAGON INJECTION, SOLUTION 0.5 MG/.1ML
1 INJECTION, SOLUTION SUBCUTANEOUS ONCE
Refills: 0 | Status: DISCONTINUED | OUTPATIENT
Start: 2020-01-21 | End: 2020-01-31

## 2020-01-21 RX ORDER — GABAPENTIN 400 MG/1
300 CAPSULE ORAL
Refills: 0 | Status: DISCONTINUED | OUTPATIENT
Start: 2020-01-21 | End: 2020-01-31

## 2020-01-21 RX ORDER — MEMANTINE HYDROCHLORIDE 10 MG/1
10 TABLET ORAL DAILY
Refills: 0 | Status: DISCONTINUED | OUTPATIENT
Start: 2020-01-21 | End: 2020-01-31

## 2020-01-21 RX ORDER — ASPIRIN/CALCIUM CARB/MAGNESIUM 324 MG
81 TABLET ORAL DAILY
Refills: 0 | Status: DISCONTINUED | OUTPATIENT
Start: 2020-01-21 | End: 2020-01-31

## 2020-01-21 RX ADMIN — Medication 1 DROP(S): at 10:49

## 2020-01-21 RX ADMIN — Medication 20 MILLIEQUIVALENT(S): at 14:45

## 2020-01-21 RX ADMIN — Medication 1 APPLICATION(S): at 14:47

## 2020-01-21 RX ADMIN — Medication 81 MILLIGRAM(S): at 14:45

## 2020-01-21 RX ADMIN — CEFTRIAXONE 100 MILLIGRAM(S): 500 INJECTION, POWDER, FOR SOLUTION INTRAMUSCULAR; INTRAVENOUS at 03:31

## 2020-01-21 RX ADMIN — LISINOPRIL 20 MILLIGRAM(S): 2.5 TABLET ORAL at 06:28

## 2020-01-21 RX ADMIN — AMLODIPINE BESYLATE 5 MILLIGRAM(S): 2.5 TABLET ORAL at 06:28

## 2020-01-21 RX ADMIN — Medication 100 GRAM(S): at 10:49

## 2020-01-21 RX ADMIN — MEMANTINE HYDROCHLORIDE 10 MILLIGRAM(S): 10 TABLET ORAL at 14:45

## 2020-01-21 RX ADMIN — Medication 40 MILLIEQUIVALENT(S): at 10:52

## 2020-01-21 RX ADMIN — CLOPIDOGREL BISULFATE 75 MILLIGRAM(S): 75 TABLET, FILM COATED ORAL at 14:45

## 2020-01-21 RX ADMIN — GABAPENTIN 300 MILLIGRAM(S): 400 CAPSULE ORAL at 06:28

## 2020-01-21 RX ADMIN — Medication 1 APPLICATION(S): at 22:41

## 2020-01-21 RX ADMIN — Medication 2: at 13:40

## 2020-01-21 RX ADMIN — ATORVASTATIN CALCIUM 10 MILLIGRAM(S): 80 TABLET, FILM COATED ORAL at 22:39

## 2020-01-21 RX ADMIN — SODIUM CHLORIDE 100 MILLILITER(S): 9 INJECTION, SOLUTION INTRAVENOUS at 03:31

## 2020-01-21 RX ADMIN — LISINOPRIL 20 MILLIGRAM(S): 2.5 TABLET ORAL at 03:31

## 2020-01-21 RX ADMIN — Medication 1 DROP(S): at 18:17

## 2020-01-21 RX ADMIN — GABAPENTIN 300 MILLIGRAM(S): 400 CAPSULE ORAL at 18:17

## 2020-01-21 RX ADMIN — Medication 1 APPLICATION(S): at 10:55

## 2020-01-21 NOTE — PROGRESS NOTE ADULT - PROBLEM SELECTOR PLAN 2
- ECG = NSR at 66 bpm, RBBB, LAD  - New T-wave inversions in the chest leads  - QTc = 478  - last TTE in 10/2018 w/ severe aortic stenosis  - Team discussed with her outpatient cardiologist, awaiting reports of TTE and recent EKGs faxed to us

## 2020-01-21 NOTE — PHYSICAL THERAPY INITIAL EVALUATION ADULT - DID THE PATIENT HAVE SURGERY?
LMOM to discuss pain scores post procedure on 2/26/19. Pt advised to return call to pain line.    n/a

## 2020-01-21 NOTE — H&P ADULT - PROBLEM SELECTOR PLAN 4
- glucose = 237 on CMP  - no recent HgbA1c level for comparison; f/u AM lab-work  - consistent carb diet  - ISS per FS  - unclear if still on glipizide at home (f/u Med- Pharmacist for verification; e-mailed)

## 2020-01-21 NOTE — H&P ADULT - ASSESSMENT
89 year old female, with past history significant for HTN, CAD - s/p Stent, Type-II DM, and HLD, presented to the ED secondary to dizziness.  Vital signs upon ED presentation as follows: BP = 169/76, HR = 65, RR = 17, T = 36.4 C (7.5 F), O2 Sat = 98% on RA.  Diagnosed with Carotid Artery Dissection.  Admitted for further management of:

## 2020-01-21 NOTE — PROGRESS NOTE ADULT - ASSESSMENT
89 year old female, with past history significant for HTN, CAD s/p Stent, Type-II DM, and HLD, presented to the ED secondary to dizziness, pt found to have R carotid artery dissection

## 2020-01-21 NOTE — CHART NOTE - NSCHARTNOTEFT_GEN_A_CORE
spoke with outpatient cardiologist office  Dr Sanford office  (Dr Baltazar) has RBBB at baseline  Recent echo AS EF 62 pulm htn moderate AS     cardiology office will fax last echo report and last ekg to hospitalist attending office     d/w Attending and RN

## 2020-01-21 NOTE — PHYSICAL THERAPY INITIAL EVALUATION ADULT - ACTIVE RANGE OF MOTION EXAMINATION, REHAB EVAL
Left LE Active ROM was WFL (within functional limits)/bilateral upper extremity Active ROM was WFL (within functional limits)/right hip and knee WFL except ankle with drop foot

## 2020-01-21 NOTE — PROGRESS NOTE ADULT - SUBJECTIVE AND OBJECTIVE BOX
[facility] Division of Hospital Medicine  Faisal White DO  Pager ([facility], 1G-1C): [phone]    Patient is a 89y old  Female who presents with a chief complaint of Carotid artery dissection (2020 01:36)      SUBJECTIVE / OVERNIGHT EVENTS: Pt was seen and examined earlier this afternoon around 12PM.  Pt is poor historian and unable to meaningfully participate in clinical findings.  States she believes she had a stroke and when told she was found to have a dissection she states "oh right, that thing that is draining my brain."        MEDICATIONS  (STANDING):  amLODIPine   Tablet 5 milliGRAM(s) Oral daily  aspirin enteric coated 81 milliGRAM(s) Oral daily  atorvastatin 10 milliGRAM(s) Oral at bedtime  cefTRIAXone   IVPB 1000 milliGRAM(s) IV Intermittent every 24 hours  clopidogrel Tablet 75 milliGRAM(s) Oral daily  dextrose 5%. 1000 milliLiter(s) (50 mL/Hr) IV Continuous <Continuous>  dextrose 50% Injectable 12.5 Gram(s) IV Push once  dextrose 50% Injectable 25 Gram(s) IV Push once  dextrose 50% Injectable 25 Gram(s) IV Push once  gabapentin 300 milliGRAM(s) Oral two times a day  insulin lispro (HumaLOG) corrective regimen sliding scale   SubCutaneous three times a day before meals  insulin lispro (HumaLOG) corrective regimen sliding scale   SubCutaneous at bedtime  lactated ringers. 1000 milliLiter(s) (100 mL/Hr) IV Continuous <Continuous>  lisinopril 20 milliGRAM(s) Oral daily  memantine 10 milliGRAM(s) Oral daily  petrolatum white Ointment 1 Application(s) Topical three times a day  timolol 0.5% Solution 1 Drop(s) Both EYES two times a day    MEDICATIONS  (PRN):  dextrose 40% Gel 15 Gram(s) Oral once PRN Blood Glucose LESS THAN 70 milliGRAM(s)/deciliter  glucagon  Injectable 1 milliGRAM(s) IntraMuscular once PRN Glucose LESS THAN 70 milligrams/deciliter      CAPILLARY BLOOD GLUCOSE      POCT Blood Glucose.: 118 mg/dL (2020 18:09)  POCT Blood Glucose.: 156 mg/dL (2020 13:28)  POCT Blood Glucose.: 139 mg/dL (2020 09:32)      PHYSICAL EXAM:  Vital Signs Last 24 Hrs  T(C): 36.6 (2020 17:05), Max: 37 (2020 14:20)  T(F): 97.9 (2020 17:05), Max: 98.6 (2020 14:20)  HR: 68 (2020 17:05) (60 - 73)  BP: 128/66 (2020 17:05) (106/54 - 200/99)  BP(mean): --  RR: 17 (2020 17:05) (17 - 18)  SpO2: 98% (2020 17:05) (97% - 100%)    CONSTITUTIONAL: NAD, well-developed  EYES: PERRLA; conjunctiva and sclera clear  RESPIRATORY: Normal respiratory effort; lungs are clear to auscultation bilaterally  CARDIOVASCULAR: Regular rate and rhythm, normal S1 and S2, 3/6 systolic murmur heard best at LUSB  ABDOMEN: Nontender to palpation, normoactive bowel sounds, no rebound/guarding  MUSCLOSKELETAL:  No clubbing or cyanosis of digits; no joint swelling or tenderness to palpation  PSYCH: A+O to person, place, poor insight into medical care  NEUROLOGY: CN 2-12 are intact and symmetric; no gross sensory deficits;   SKIN: No rashes; no palpable lesions    LABS:                        12.6   12.44 )-----------( 209      ( 2020 05:45 )             38.5     01-21    139  |  97<L>  |  6<L>  ----------------------------<  144<H>  3.3<L>   |  27  |  0.76    Ca    9.1      2020 05:45  Phos  2.5       Mg     1.2         TPro  6.6  /  Alb  3.9  /  TBili  0.6  /  DBili  x   /  AST  19  /  ALT  12  /  AlkPhos  64  -20          Urinalysis Basic - ( 2020 20:45 )    Color: LIGHT YELLOW / Appearance: Lt TURBID / S.036 / pH: 7.5  Gluc: NEGATIVE / Ketone: NEGATIVE  / Bili: NEGATIVE / Urobili: NORMAL   Blood: TRACE / Protein: 10 / Nitrite: NEGATIVE   Leuk Esterase: LARGE / RBC: 3-5 / WBC >50   Sq Epi: FEW / Non Sq Epi: x / Bacteria: FEW          RADIOLOGY & ADDITIONAL TESTS:  Results Reviewed:   < from: CT Angio Head w/ IV Cont (20 @ 17:27) >  IMPRESSION:     CT brain: No acute intracranial abnormality is noted. If the patient remains symptomatic, consider short interval follow-up head CT or brain MRI follow-up. Chronic right maxillary and sphenoid sinusitis.    CT angiography neck: A short segment dissection involves the distal right common carotid artery with extension to the proximal internal and external carotid arteries. There is no associated significant vascular narrowing. Serial imaging follow-up over time is recommended.    CT angiography brain: No evidence for significant stenosis or major vessel occlusion about the [place].    < end of copied text >  < from: CT Angio Neck w/ IV Cont (20 @ 17:27) >  A dissection flap is noted involving the distal right common carotid artery, which extends into the proximal right internal carotid artery and proximal right external carotid artery. There is no associatedvascular narrowing. Calcified plaques are noted at the right carotid bifurcation without significant stenosis by NASCET criteria.    < end of copied text >

## 2020-01-21 NOTE — H&P ADULT - PROBLEM SELECTOR PLAN 7
- unclear if any changes to meds since last discharge  - f/u Med-Hx Pharmacist for med verification (e-mailed)

## 2020-01-21 NOTE — H&P ADULT - NSICDXFAMILYHX_GEN_ALL_CORE_FT
FAMILY HISTORY:  Patient unable to provide medical history FAMILY HISTORY:  Family history of hypertension, ~ sister and brother

## 2020-01-21 NOTE — PHYSICAL THERAPY INITIAL EVALUATION ADULT - LEVEL OF INDEPENDENCE, REHAB EVAL
77 F PMH CAD s/p CABG x 4 2015, T2DM with peripheral neuropathy, ESRD on HD T/Th/S via L AVF, hypotension on midodrine, HFrEF, HTN, HLD, b/l cataracts s/p surgery, p/w mechanical fall and R leg pain, found to have R tib/fib fracture.   pt with resp failure, now intubated in micu    resp failure  likely multifactorial  vent support  pressure support prn  mngt as per MICU    bacteremia/sepsis   id f/u  iv abx  f/u LISA - prelim positive for vegetation f/u official report. CTSx consult   f/u repeat cult    open fracture of proximal end of right tibia,  confirmed on XR and CT  -ortho recs appreciated:  s/p cast. no surgical intervention at this time  outpt f/u  NWB RLE     Coronary artery disease without angina pectoris  -c/w aspirin, statin  -f/u cards .        Type 2 diabetes mellitus with hyperglycemia, with long-term current use of insulin.  start HISS  endo consult dr ronak maciel, CC diet,    ESRD on hemodialysis.  HD as per renal      ascites.   s/p paracentesis   f/u fluid analysis     emesis  gi consulted  f/u guaiac  ppi  monitor cbc       mngt as per MICU minimum assist (75% patients effort)

## 2020-01-21 NOTE — ED ADULT NURSE REASSESSMENT NOTE - NS ED NURSE REASSESS COMMENT FT1
Report given to ESS[facility] 1 RN. Pt stable and in no apparent distress. Respirations are equal and nonlabored, no respiratory distress noted.

## 2020-01-21 NOTE — PROGRESS NOTE ADULT - PROBLEM SELECTOR PLAN 7
1.  PCP Name                   = Fransisco Sanford cardiologist  2.  PCP Contacted at Admission =  [ x  ] Y       [  ] N          [  ] N/A  3.  PCP Contacted at Discharge  =  [  ] Y       [  ] N          [  ] N/A  4.  Post-Discharge Appointment Date and Location =   5.  Summary of Handoff Given to PCP                    =

## 2020-01-21 NOTE — H&P ADULT - NSICDXPASTSURGICALHX_GEN_ALL_CORE_FT
PAST SURGICAL HISTORY:  S/P coronary artery stent placement PAST SURGICAL HISTORY:  S/P coronary artery stent placement     S/P hip replacement, bilateral

## 2020-01-21 NOTE — H&P ADULT - NSHPREVIEWOFSYSTEMS_GEN_ALL_CORE
REVIEW OF SYSTEMS:    CONSTITUTIONAL: A bit tired, but no fevers or chills  EYES/ENT: No visual changes;  No dysphagia, Dryness of lips/mouth  NECK: No pain or stiffness,  RESPIRATORY: No cough, hemoptysis; No shortness of breath  CARDIOVASCULAR: No chest pain or palpitations; No lower extremity edema  GASTROINTESTINAL: No abdominal or epigastric pain. No nausea, vomiting, or hematemesis; No diarrhea or constipation. No melena or hematochezia.  GENITOURINARY: No dysuria, frequency or hematuria  MUSCULOSKELETAL: No joint pain, swelling, decreased ROM, erythema, warmth  NEUROLOGICAL: Foot drop on the right - as sequela of R-hip surgery.    SKIN: No itching, burning, rashes, or lesions   All other review of systems is negative unless indicated above.

## 2020-01-21 NOTE — H&P ADULT - NSICDXPASTMEDICALHX_GEN_ALL_CORE_FT
PAST MEDICAL HISTORY:  CAD (coronary artery disease)     Dementia     DM (diabetes mellitus)     HLD (hyperlipidemia)     HTN (hypertension) PAST MEDICAL HISTORY:  CAD (coronary artery disease)     Dementia     DM (diabetes mellitus)     Glaucoma     HLD (hyperlipidemia)     HTN (hypertension)

## 2020-01-21 NOTE — PHYSICAL THERAPY INITIAL EVALUATION ADULT - ADDITIONAL COMMENTS
Patient report lives with  in house, chair lift, has 24 hours HHA, ambulates with walker and assistance.

## 2020-01-21 NOTE — H&P ADULT - NSHPPHYSICALEXAM_GEN_ALL_CORE
PHYSICAL EXAMINATION:    APPEARANCE: Thin, dehydrated appearing female, lying propped up in stretcher in NAD.  A bit anxious, but in NAD  HEENT:   Normal oral mucosa, PERRL, EOMI	  LYMPHATIC: No lymphadenopathy  CARDIOVASCULAR: Normal S1 S2, No JVD, No murmurs, No edema  RESPIRATORY: Lungs clear to auscultation	  PSYCHIATRIC: Alert, appropriately verbally communicative, but slightly confused at times.  GASTROINTESTINAL: No dysphagia.  Abd soft, non-tender, + BS	  SKIN: No rashes, No ecchymoses, No cyanosis	  NEUROLOGICAL: Non-focal, A&Ox3, nonfocal, HAY x 4 against gravity  EXTREMITIES: Normal range of motion, No clubbing, cyanosis or edema.  R-foot slightly larger than left.  VASCULAR: Peripheral pulses - radial and DP appreciated bilaterally

## 2020-01-21 NOTE — H&P ADULT - PROBLEM SELECTOR PLAN 6
- very dry lips and oral mucosa, as well as dry skin  - report feeling thirsty  - s/p one liter LR in the ED  - continuing w/ maintenance IVF hydration  - f/u for improvement

## 2020-01-21 NOTE — H&P ADULT - HISTORY OF PRESENT ILLNESS
89 year old female, with past history significant for HTN, CAD - s/p Stent, Type-II DM, and HLD, presented to the ED secondary to dizziness.  Seen and evaluated at bedside;    Vital signs upon ED presentation as follows: BP = 169/76, HR = 65, RR = 17, T = 36.4 C (97.5 F), O2 Sat = 98% on RA.  Diagnosed with Carotid Artery Dissection.  Prescribed aspirin 81 mg, Plavix 75 mg, meclizine 25 mg and LR one liter in the ED. 89 year old female, with past history significant for HTN, CAD - s/p Stent, Type-II DM, and HLD, presented to the ED secondary to dizziness.  Seen and evaluated at bedside; a bit anxious about family being absent, but in NAD.  Patient states that she thinks she had a stroke because she is now able to recall events from ~ 20 years ago.  Per patient, she was sitting on the commode at home, when she felt headache and began hearing voices.  Called out to HHA and  due to inability to stand from the commode because of weakness.  States no fall.  No     Vital signs upon ED presentation as follows: BP = 169/76, HR = 65, RR = 17, T = 36.4 C (97.5 F), O2 Sat = 98% on RA.  Diagnosed with Carotid Artery Dissection.  Prescribed aspirin 81 mg, Plavix 75 mg, meclizine 25 mg and LR one liter in the ED. 89 year old female, with past history significant for HTN, CAD - s/p Stent, Type-II DM, and HLD, presented to the ED secondary to dizziness.  Seen and evaluated at bedside; a bit anxious about family being absent, but in NAD.  Patient states that she thinks she had a stroke because she is now able to recall events from ~ 20 years ago.  Gets a bit confused at times while relating the events, but able to provide some information: Per patient, she was sitting on the commode at home, when she felt sudden dizziness and headache.  Comments on hearing voices.  Called out to HHA and  due to inability to stand from the commode because of weakness.  States no fall.  Complained of dizziness while sitting up and needing to go to the bathroom in the ED.      Also reports no burning or itching with micturition.      Vital signs upon ED presentation as follows: BP = 169/76, HR = 65, RR = 17, T = 36.4 C (97.5 F), O2 Sat = 98% on RA.  Diagnosed with Carotid Artery Dissection.  Prescribed aspirin 81 mg, Plavix 75 mg, meclizine 25 mg and LR one liter in the ED.

## 2020-01-21 NOTE — H&P ADULT - PROBLEM SELECTOR PLAN 3
- patient reports no symptoms, but info unreliable due to dementia  - starting ceftriaxone  - f/u urine culture  - ensure optimal hydration

## 2020-01-21 NOTE — PROGRESS NOTE ADULT - PROBLEM SELECTOR PLAN 1
- sudden onset of dizziness and headache while using the commode  - CT scan demonstrative of "A short segment dissection involves the distal right common carotid artery with extension to the proximal internal and external carotid arteries. There is no associated significant vascular narrowing."  - already seen by Neuro-surg and Neurology teams (appreciated); recommendation for aspirin, plavix, MRI, MRAs (pending)   - Plan to continue with DAPT x 3 weeks, then ASA only indefinitely   - no need for AC presently, per Neuro

## 2020-01-21 NOTE — H&P ADULT - NSHPSOCIALHISTORY_GEN_ALL_CORE
No history of Smoking  No history of Alcohol abuse  No history of Recreational drug use    Has 24 hours HHA  Ambulates with use of a walker, with assistance, at baseline

## 2020-01-21 NOTE — H&P ADULT - PROBLEM SELECTOR PLAN 5
- BP to 200/99 max  - on lisinopril 20 mg and amlodipine 5 mg at last admission; continuing same  - f/u Med-Hx Pharmacist for med verification  - modify therapy, as needed

## 2020-01-21 NOTE — H&P ADULT - PROBLEM SELECTOR PLAN 2
- ECG = NSR at 66 bpm, RBBB, LAD  - New T-wave inversions in the chest leads  - QTc = 478  - last TTE in 10/2018 w/ severe aortic stenosis  - would obtain collateral information from outpatient provider  - if no outpatient information would get TTE (pls order if necessary)  - may need cardiology evaluation (pls call)  -

## 2020-01-21 NOTE — H&P ADULT - PROBLEM SELECTOR PLAN 1
- sudden onset of dizziness and headache while using the commode  - CT scan demonstrative of "A short segment dissection involves the distal right common carotid artery with extension to the proximal internal and external carotid arteries. There is no associated significant vascular narrowing."  - already seen by Neuro-surg and Neurology teams (appreciated); recommendation for aspirin, plavix, MRIs, MRA.  - "Decision to continue DAPT to be made by stroke team tomorrow," per Neurology team  - Neuro-checks  - f/u AM lab-work  - continuing statin - sudden onset of dizziness and headache while using the commode  - CT scan demonstrative of "A short segment dissection involves the distal right common carotid artery with extension to the proximal internal and external carotid arteries. There is no associated significant vascular narrowing."  - already seen by Neuro-surg and Neurology teams (appreciated); recommendation for aspirin, plavix, MRI, MRAs.  - "Decision to continue DAPT to be made by stroke team tomorrow," per Neurology team  - no need for AC presently, per Neuro (see note)  - call Neuro STAT if any neurological changes (per Neuro)  - Neuro-checks, fall pre precautions  - continuing statin  - f/u AM lab-work

## 2020-01-21 NOTE — H&P ADULT - PROBLEM SELECTOR PLAN 8
1.  PCP Name                           =   2.  PCP Contacted at Admission =  [  ] Y       [  ] N          [  ] N/A  3.  PCP Contacted at Discharge  =  [  ] Y       [  ] N          [  ] N/A  4.  Post-Discharge Appointment Date and Location =   5.  Summary of Handoff Given to PCP                    =

## 2020-01-22 LAB
ANION GAP SERPL CALC-SCNC: 15 MMO/L — HIGH (ref 7–14)
ANION GAP SERPL CALC-SCNC: 21 MMO/L — HIGH (ref 7–14)
BASE EXCESS BLDV CALC-SCNC: 2.1 MMOL/L — SIGNIFICANT CHANGE UP
BASOPHILS # BLD AUTO: 0.03 K/UL — SIGNIFICANT CHANGE UP (ref 0–0.2)
BASOPHILS NFR BLD AUTO: 0.3 % — SIGNIFICANT CHANGE UP (ref 0–2)
BLOOD GAS VENOUS - CREATININE: 0.72 MG/DL — SIGNIFICANT CHANGE UP (ref 0.5–1.3)
BUN SERPL-MCNC: 9 MG/DL — SIGNIFICANT CHANGE UP (ref 7–23)
BUN SERPL-MCNC: 9 MG/DL — SIGNIFICANT CHANGE UP (ref 7–23)
CALCIUM SERPL-MCNC: 8.9 MG/DL — SIGNIFICANT CHANGE UP (ref 8.4–10.5)
CALCIUM SERPL-MCNC: 9.2 MG/DL — SIGNIFICANT CHANGE UP (ref 8.4–10.5)
CHLORIDE BLDV-SCNC: 103 MMOL/L — SIGNIFICANT CHANGE UP (ref 96–108)
CHLORIDE SERPL-SCNC: 101 MMOL/L — SIGNIFICANT CHANGE UP (ref 98–107)
CHLORIDE SERPL-SCNC: 105 MMOL/L — SIGNIFICANT CHANGE UP (ref 98–107)
CO2 SERPL-SCNC: 25 MMOL/L — SIGNIFICANT CHANGE UP (ref 22–31)
CO2 SERPL-SCNC: 26 MMOL/L — SIGNIFICANT CHANGE UP (ref 22–31)
CREAT SERPL-MCNC: 0.69 MG/DL — SIGNIFICANT CHANGE UP (ref 0.5–1.3)
CREAT SERPL-MCNC: 0.69 MG/DL — SIGNIFICANT CHANGE UP (ref 0.5–1.3)
EOSINOPHIL # BLD AUTO: 0.14 K/UL — SIGNIFICANT CHANGE UP (ref 0–0.5)
EOSINOPHIL NFR BLD AUTO: 1.3 % — SIGNIFICANT CHANGE UP (ref 0–6)
GAS PNL BLDV: 137 MMOL/L — SIGNIFICANT CHANGE UP (ref 136–146)
GLUCOSE BLDV-MCNC: 113 MG/DL — HIGH (ref 70–99)
GLUCOSE SERPL-MCNC: 137 MG/DL — HIGH (ref 70–99)
GLUCOSE SERPL-MCNC: 151 MG/DL — HIGH (ref 70–99)
HCO3 BLDV-SCNC: 25 MMOL/L — SIGNIFICANT CHANGE UP (ref 20–27)
HCT VFR BLD CALC: 41.8 % — SIGNIFICANT CHANGE UP (ref 34.5–45)
HCT VFR BLDV CALC: 38.3 % — SIGNIFICANT CHANGE UP (ref 34.5–45)
HGB BLD-MCNC: 13.7 G/DL — SIGNIFICANT CHANGE UP (ref 11.5–15.5)
HGB BLDV-MCNC: 12.4 G/DL — SIGNIFICANT CHANGE UP (ref 11.5–15.5)
IMM GRANULOCYTES NFR BLD AUTO: 0.3 % — SIGNIFICANT CHANGE UP (ref 0–1.5)
LACTATE BLDV-MCNC: 1.4 MMOL/L — SIGNIFICANT CHANGE UP (ref 0.5–2)
LYMPHOCYTES # BLD AUTO: 1.45 K/UL — SIGNIFICANT CHANGE UP (ref 1–3.3)
LYMPHOCYTES # BLD AUTO: 13.4 % — SIGNIFICANT CHANGE UP (ref 13–44)
MAGNESIUM SERPL-MCNC: 1.5 MG/DL — LOW (ref 1.6–2.6)
MAGNESIUM SERPL-MCNC: 1.5 MG/DL — LOW (ref 1.6–2.6)
MCHC RBC-ENTMCNC: 30.4 PG — SIGNIFICANT CHANGE UP (ref 27–34)
MCHC RBC-ENTMCNC: 32.8 % — SIGNIFICANT CHANGE UP (ref 32–36)
MCV RBC AUTO: 92.9 FL — SIGNIFICANT CHANGE UP (ref 80–100)
MONOCYTES # BLD AUTO: 0.7 K/UL — SIGNIFICANT CHANGE UP (ref 0–0.9)
MONOCYTES NFR BLD AUTO: 6.5 % — SIGNIFICANT CHANGE UP (ref 2–14)
NEUTROPHILS # BLD AUTO: 8.47 K/UL — HIGH (ref 1.8–7.4)
NEUTROPHILS NFR BLD AUTO: 78.2 % — HIGH (ref 43–77)
NRBC # FLD: 0 K/UL — SIGNIFICANT CHANGE UP (ref 0–0)
PCO2 BLDV: 49 MMHG — SIGNIFICANT CHANGE UP (ref 41–51)
PH BLDV: 7.36 PH — SIGNIFICANT CHANGE UP (ref 7.32–7.43)
PHOSPHATE SERPL-MCNC: 2.7 MG/DL — SIGNIFICANT CHANGE UP (ref 2.5–4.5)
PHOSPHATE SERPL-MCNC: 2.8 MG/DL — SIGNIFICANT CHANGE UP (ref 2.5–4.5)
PLATELET # BLD AUTO: 185 K/UL — SIGNIFICANT CHANGE UP (ref 150–400)
PMV BLD: 10.2 FL — SIGNIFICANT CHANGE UP (ref 7–13)
PO2 BLDV: 43 MMHG — HIGH (ref 35–40)
POTASSIUM BLDV-SCNC: 3.1 MMOL/L — LOW (ref 3.4–4.5)
POTASSIUM SERPL-MCNC: 3.6 MMOL/L — SIGNIFICANT CHANGE UP (ref 3.5–5.3)
POTASSIUM SERPL-MCNC: 4.2 MMOL/L — SIGNIFICANT CHANGE UP (ref 3.5–5.3)
POTASSIUM SERPL-SCNC: 3.6 MMOL/L — SIGNIFICANT CHANGE UP (ref 3.5–5.3)
POTASSIUM SERPL-SCNC: 4.2 MMOL/L — SIGNIFICANT CHANGE UP (ref 3.5–5.3)
RBC # BLD: 4.5 M/UL — SIGNIFICANT CHANGE UP (ref 3.8–5.2)
RBC # FLD: 13.9 % — SIGNIFICANT CHANGE UP (ref 10.3–14.5)
SAO2 % BLDV: 73 % — SIGNIFICANT CHANGE UP (ref 60–85)
SODIUM SERPL-SCNC: 142 MMOL/L — SIGNIFICANT CHANGE UP (ref 135–145)
SODIUM SERPL-SCNC: 151 MMOL/L — HIGH (ref 135–145)
WBC # BLD: 10.82 K/UL — HIGH (ref 3.8–10.5)
WBC # FLD AUTO: 10.82 K/UL — HIGH (ref 3.8–10.5)

## 2020-01-22 PROCEDURE — 99232 SBSQ HOSP IP/OBS MODERATE 35: CPT

## 2020-01-22 RX ORDER — POTASSIUM CHLORIDE 20 MEQ
10 PACKET (EA) ORAL
Refills: 0 | Status: COMPLETED | OUTPATIENT
Start: 2020-01-22 | End: 2020-01-22

## 2020-01-22 RX ORDER — MAGNESIUM SULFATE 500 MG/ML
1 VIAL (ML) INJECTION ONCE
Refills: 0 | Status: COMPLETED | OUTPATIENT
Start: 2020-01-22 | End: 2020-01-22

## 2020-01-22 RX ADMIN — MEMANTINE HYDROCHLORIDE 10 MILLIGRAM(S): 10 TABLET ORAL at 11:54

## 2020-01-22 RX ADMIN — Medication 0.5 MILLIGRAM(S): at 16:38

## 2020-01-22 RX ADMIN — AMLODIPINE BESYLATE 5 MILLIGRAM(S): 2.5 TABLET ORAL at 06:07

## 2020-01-22 RX ADMIN — Medication 1 APPLICATION(S): at 23:00

## 2020-01-22 RX ADMIN — Medication 100 MILLIEQUIVALENT(S): at 22:19

## 2020-01-22 RX ADMIN — Medication 1 DROP(S): at 06:08

## 2020-01-22 RX ADMIN — Medication 100 MILLIEQUIVALENT(S): at 23:27

## 2020-01-22 RX ADMIN — Medication 2: at 13:09

## 2020-01-22 RX ADMIN — Medication 1 APPLICATION(S): at 11:53

## 2020-01-22 RX ADMIN — Medication 1 APPLICATION(S): at 06:08

## 2020-01-22 RX ADMIN — GABAPENTIN 300 MILLIGRAM(S): 400 CAPSULE ORAL at 06:07

## 2020-01-22 RX ADMIN — CEFTRIAXONE 100 MILLIGRAM(S): 500 INJECTION, POWDER, FOR SOLUTION INTRAMUSCULAR; INTRAVENOUS at 02:50

## 2020-01-22 RX ADMIN — Medication 1 DROP(S): at 16:39

## 2020-01-22 RX ADMIN — Medication 100 GRAM(S): at 11:44

## 2020-01-22 RX ADMIN — Medication 81 MILLIGRAM(S): at 11:53

## 2020-01-22 RX ADMIN — LISINOPRIL 20 MILLIGRAM(S): 2.5 TABLET ORAL at 06:07

## 2020-01-22 RX ADMIN — GABAPENTIN 300 MILLIGRAM(S): 400 CAPSULE ORAL at 16:39

## 2020-01-22 RX ADMIN — CLOPIDOGREL BISULFATE 75 MILLIGRAM(S): 75 TABLET, FILM COATED ORAL at 11:54

## 2020-01-22 NOTE — OCCUPATIONAL THERAPY INITIAL EVALUATION ADULT - DIAGNOSIS, OT EVAL
s/p dizziness, s/p right carotid artery dissection; decreased functional mobility, decreased ADL performance, A&O x 1-2, decreased standing balance

## 2020-01-22 NOTE — OCCUPATIONAL THERAPY INITIAL EVALUATION ADULT - GENERAL OBSERVATIONS, REHAB EVAL
Pt received semisupine in bed in NAD. + Prima fit. A&O x1-2. Per RN Hannah, pt okay to participate in OT evaluation

## 2020-01-22 NOTE — PROGRESS NOTE ADULT - PROBLEM SELECTOR PLAN 1
- sudden onset of dizziness and headache while using the commode  - CT scan demonstrative of "A short segment dissection involves the distal right common carotid artery with extension to the proximal internal and external carotid arteries. There is no associated significant vascular narrowing."  - already seen by Neuro-surg and Neurology teams (appreciated), recommendation for aspirin, plavix, MRI, MRAs (pending)   - Plan to continue with DAPT x 3 weeks, then ASA only indefinitely   - no need for AC presently, per Neuro

## 2020-01-22 NOTE — OCCUPATIONAL THERAPY INITIAL EVALUATION ADULT - PLANNED THERAPY INTERVENTIONS, OT EVAL
cognitive, visual perceptual/bed mobility training/neuromuscular re-education/strengthening/transfer training/ADL retraining/balance training

## 2020-01-22 NOTE — PROGRESS NOTE ADULT - SUBJECTIVE AND OBJECTIVE BOX
ACP team PA Note     Patient is a 89y old  Female who presents with a chief complaint of Carotid artery dissection (22 Jan 2020 12:54). Was called to evaluate for pt is somnolent and not arousable. Pt is A&O x2 at baseline,         MEDICATIONS  (STANDING):  amLODIPine   Tablet 5 milliGRAM(s) Oral daily  aspirin enteric coated 81 milliGRAM(s) Oral daily  atorvastatin 10 milliGRAM(s) Oral at bedtime  cefTRIAXone   IVPB 1000 milliGRAM(s) IV Intermittent every 24 hours  clopidogrel Tablet 75 milliGRAM(s) Oral daily  dextrose 5%. 1000 milliLiter(s) (50 mL/Hr) IV Continuous <Continuous>  dextrose 50% Injectable 12.5 Gram(s) IV Push once  dextrose 50% Injectable 25 Gram(s) IV Push once  dextrose 50% Injectable 25 Gram(s) IV Push once  gabapentin 300 milliGRAM(s) Oral two times a day  insulin lispro (HumaLOG) corrective regimen sliding scale   SubCutaneous three times a day before meals  insulin lispro (HumaLOG) corrective regimen sliding scale   SubCutaneous at bedtime  lactated ringers. 1000 milliLiter(s) (100 mL/Hr) IV Continuous <Continuous>  lisinopril 20 milliGRAM(s) Oral daily  memantine 10 milliGRAM(s) Oral daily  petrolatum white Ointment 1 Application(s) Topical three times a day  timolol 0.5% Solution 1 Drop(s) Both EYES two times a day    MEDICATIONS  (PRN):  dextrose 40% Gel 15 Gram(s) Oral once PRN Blood Glucose LESS THAN 70 milliGRAM(s)/deciliter  glucagon  Injectable 1 milliGRAM(s) IntraMuscular once PRN Glucose LESS THAN 70 milligrams/deciliter            Vital Signs Last 24 Hrs  T(C): 37 (22 Jan 2020 20:08), Max: 37 (22 Jan 2020 20:08)  T(F): 98.6 -- rectal (22 Jan 2020 20:08), Max: 98.6 (22 Jan 2020 20:08)  HR: 62 (22 Jan 2020 19:49) (61 - 80)  BP: 106/60 (22 Jan 2020 19:49) (106/60 - 171/77)  BP(mean): --  RR: 18 (22 Jan 2020 19:49) (18 - 18)  SpO2: 95% (22 Jan 2020 19:49) (95% - 99%)          INTERPRETATION OF TELEMETRY: NSR 62 bpm     ECG:        LABS:                        13.7   10.82 )-----------( 185      ( 22 Jan 2020 05:31 )             41.8     01-22    142  |  101  |  9   ----------------------------<  151<H>  3.6   |  26  |  0.69    Ca    9.2      22 Jan 2020 09:16  Phos  2.7     01-22  Mg     1.5     01-22              I&O's Summary    BNP    RADIOLOGY & ADDITIONAL STUDIES:        Physical Exam:  General; Pt appears well and in no acute distress   HEENT:   Normal oral mucosa, PERRL, EOMI	  Lymphatic: No lymphadenopathy , no edema  Cardiovascular: Normal S1 S2, No JVD, No murmurs , Peripheral pulses palpable 2+ bilaterally  Respiratory: Lungs clear to auscultation, normal effort 	  Gastrointestinal:  Soft, Non-tender, + BS	  Skin: No rashes, No ecchymoses, No cyanosis, warm to touch  Musculoskeletal: Normal range of motion, normal strength  Psychiatry:  Mood & affect appropriate ACP team PA Note     Patient is a 89y old  Female who presents with a chief complaint of Carotid artery dissection (22 Jan 2020 12:54). Was called to evaluate for pt is somnolent and not arousable, since pt was sent for MRI study. Pt was premedicated for the study with Ativan 0.5 mg PO x 1. Pt is A&O x2 at baseline,         MEDICATIONS  (STANDING):  amLODIPine   Tablet 5 milliGRAM(s) Oral daily  aspirin enteric coated 81 milliGRAM(s) Oral daily  atorvastatin 10 milliGRAM(s) Oral at bedtime  cefTRIAXone   IVPB 1000 milliGRAM(s) IV Intermittent every 24 hours  clopidogrel Tablet 75 milliGRAM(s) Oral daily  dextrose 5%. 1000 milliLiter(s) (50 mL/Hr) IV Continuous <Continuous>  dextrose 50% Injectable 12.5 Gram(s) IV Push once  dextrose 50% Injectable 25 Gram(s) IV Push once  dextrose 50% Injectable 25 Gram(s) IV Push once  gabapentin 300 milliGRAM(s) Oral two times a day  insulin lispro (HumaLOG) corrective regimen sliding scale   SubCutaneous three times a day before meals  insulin lispro (HumaLOG) corrective regimen sliding scale   SubCutaneous at bedtime  lactated ringers. 1000 milliLiter(s) (100 mL/Hr) IV Continuous <Continuous>  lisinopril 20 milliGRAM(s) Oral daily  memantine 10 milliGRAM(s) Oral daily  petrolatum white Ointment 1 Application(s) Topical three times a day  timolol 0.5% Solution 1 Drop(s) Both EYES two times a day    MEDICATIONS  (PRN):  dextrose 40% Gel 15 Gram(s) Oral once PRN Blood Glucose LESS THAN 70 milliGRAM(s)/deciliter  glucagon  Injectable 1 milliGRAM(s) IntraMuscular once PRN Glucose LESS THAN 70 milligrams/deciliter            Vital Signs Last 24 Hrs  T(C): 37 (22 Jan 2020 20:08), Max: 37 (22 Jan 2020 20:08)  T(F): 98.6 -- rectal (22 Jan 2020 20:08), Max: 98.6 (22 Jan 2020 20:08)  HR: 62 (22 Jan 2020 19:49) (61 - 80)  BP: 106/60 (22 Jan 2020 19:49) (106/60 - 171/77)  BP(mean): --  RR: 18 (22 Jan 2020 19:49) (18 - 18)  SpO2: 95% (22 Jan 2020 19:49) (95% - 99%)          INTERPRETATION OF TELEMETRY: NSR 62 bpm     ECG:        LABS:                        13.7   10.82 )-----------( 185      ( 22 Jan 2020 05:31 )             41.8     01-22    142  |  101  |  9   ----------------------------<  151<H>  3.6   |  26  |  0.69    Ca    9.2      22 Jan 2020 09:16  Phos  2.7     01-22  Mg     1.5     01-22              I&O's Summary    BNP    RADIOLOGY & ADDITIONAL STUDIES:        Physical Exam:  General; Pt appears somnolent, not arousable, responsive to painful stimuli-- equal B/L    HEENT:   Normal oral mucosa, PERRL, EOMI	  Lymphatic: No lymphadenopathy , no edema  Cardiovascular: Normal S1 S2, No JVD, + systolic murmur- 2/6, Peripheral pulses palpable 2+ bilaterally  Respiratory: Diminished breath sounds-- R basilar, normal effort, no labored breathing  	  Gastrointestinal:  Soft, Non-tender, + BS	  Neurology: pt is somnolent, not arousable, pupils are 1 mm- B/L, sluggish response to light, equal B/L,   reflexes-- normal, equal B/L, pulls all extremities to painful stimuli-- equal B/L. No focal weakness,  Skin: No rashes, No ecchymoses, No cyanosis, warm to touch  Musculoskeletal: Normal range of motion, normal strength  Psychiatry:  Mood & affect appropriate

## 2020-01-22 NOTE — OCCUPATIONAL THERAPY INITIAL EVALUATION ADULT - ANTICIPATED DISCHARGE DISPOSITION, OT EVAL
Recommending pt return home with no OT needs with resumption of 24/7 home health aide services as prior to admission for assistance with ADLs and functional mobility.

## 2020-01-22 NOTE — PROGRESS NOTE ADULT - ASSESSMENT
AMS, somnolent, possibly in the setting of oversedation,   R carotid dissection  UTI/cystitis  CAD s/p cardiac stent  Type 2 DM  HTN  HLD    CV: pt is hemodynamically stable, sinus rhythm on tele monitor,   Resp: pt is in no acute respiratory distress, VBG- pH- 7.36, pCO2- 49,   -- able to protect her airways on RA- SpO2- 95 % on RA, in no acute distress, no labored breathing,   ID: pt is afebrile, no leucocytosis, on Ceftriaxone, urine cx-- pending,   Neuro: pt is somnolent, no focal weakness, pt received Ativan 0.5 mg PO -- premedication for the procedure,   Renal: BUn/Creat- 9/0.6, pt is making adequate UOP,   Code: Full,   GI: pt is NPO at the present time, but was tolerating PO diet earlier today,   Hematology; H/H- 13/41, plts- 185,   F/E/N: AMS, somnolent, possibly in the setting of oversedation, and decreased BP,   R carotid dissection  UTI/cystitis  Dementia  CAD s/p cardiac stent  Type 2 DM  HTN  HLD    CV: pt is hemodynamically stable, sinus rhythm on tele monitor, Vital signs q 4 hrs,   -- pt's BP is very labile, dropped from 170's in the earlier part of the day to 106 in the evening, -- continue monitoring,   Resp: pt is in no acute respiratory distress, VBG- pH- 7.36, pCO2- 49,   -- able to protect her airways on RA- SpO2- 95 % on RA, in no acute distress, no labored breathing,   ID: pt is afebrile, no leucocytosis, on Ceftriaxone, urine cx-- pending, LA- 1.4,   Neuro: pt is somnolent, no focal weakness, pt received Ativan 0.5 mg PO -- premedication for the procedure, which possibly caused the pt's current mental state,   -- CTH from 01/20-- no acute changes, CTA/h from 01/20- no evidence of significant stenosis,   -- neuro checks q 4 hrs, continue monitoring,   Endo: FS- 116>108,   Renal: BUN/Creat- 9/0.6, pt is making adequate UOP,   Code: Full,   GI: pt is NPO at the present time, but was tolerating PO diet earlier today,   -- feed once more alert and awake,   Hematology; H/H- 13/41, plts- 185,   F/E/N: K- 3.6, repeat on VBG- 3.1-- supplemented, repeat in am, Mg- 1.5 --supplemented. Phos- 2.7

## 2020-01-22 NOTE — OCCUPATIONAL THERAPY INITIAL EVALUATION ADULT - LIVES WITH, PROFILE
Pt unable to provide accurate social history due to decreased cognition/dementia. Per H&P, pt lives with her  in a private home with chair lift to the 2nd floor. + 24/7 live in home health aide for assistance with ADLs

## 2020-01-22 NOTE — OCCUPATIONAL THERAPY INITIAL EVALUATION ADULT - PERTINENT HX OF CURRENT PROBLEM, REHAB EVAL
89 year old female, with past history significant for HTN, CAD s/p Stent, Type-II DM, and HLD, presented to the ED secondary to dizziness. Pt found to have right carotid artery dissection on CT neck.

## 2020-01-22 NOTE — PROGRESS NOTE ADULT - SUBJECTIVE AND OBJECTIVE BOX
[facility] Division of Hospital Medicine  Faisal White DO  Pager (DEN-F, 2W-0L): [phone]    Patient is a 89y old  Female who presents with a chief complaint of Carotid artery dissection (2020 21:00)      SUBJECTIVE / OVERNIGHT EVENTS: Pt unable to provide ROS given dementia/confusion.  Care discussed with her daughter on phone - she is concerned that mom usually doesn't ramble nonsensically like she is now.       MEDICATIONS  (STANDING):  amLODIPine   Tablet 5 milliGRAM(s) Oral daily  aspirin enteric coated 81 milliGRAM(s) Oral daily  atorvastatin 10 milliGRAM(s) Oral at bedtime  cefTRIAXone   IVPB 1000 milliGRAM(s) IV Intermittent every 24 hours  clopidogrel Tablet 75 milliGRAM(s) Oral daily  dextrose 5%. 1000 milliLiter(s) (50 mL/Hr) IV Continuous <Continuous>  dextrose 50% Injectable 12.5 Gram(s) IV Push once  dextrose 50% Injectable 25 Gram(s) IV Push once  dextrose 50% Injectable 25 Gram(s) IV Push once  gabapentin 300 milliGRAM(s) Oral two times a day  insulin lispro (HumaLOG) corrective regimen sliding scale   SubCutaneous three times a day before meals  insulin lispro (HumaLOG) corrective regimen sliding scale   SubCutaneous at bedtime  lactated ringers. 1000 milliLiter(s) (100 mL/Hr) IV Continuous <Continuous>  lisinopril 20 milliGRAM(s) Oral daily  memantine 10 milliGRAM(s) Oral daily  petrolatum white Ointment 1 Application(s) Topical three times a day  timolol 0.5% Solution 1 Drop(s) Both EYES two times a day    MEDICATIONS  (PRN):  dextrose 40% Gel 15 Gram(s) Oral once PRN Blood Glucose LESS THAN 70 milliGRAM(s)/deciliter  glucagon  Injectable 1 milliGRAM(s) IntraMuscular once PRN Glucose LESS THAN 70 milligrams/deciliter      CAPILLARY BLOOD GLUCOSE      POCT Blood Glucose.: 192 mg/dL (2020 12:46)  POCT Blood Glucose.: 129 mg/dL (2020 08:53)  POCT Blood Glucose.: 175 mg/dL (2020 22:41)  POCT Blood Glucose.: 118 mg/dL (2020 18:09)  POCT Blood Glucose.: 156 mg/dL (2020 13:28)    I&O's Summary      PHYSICAL EXAM:  Vital Signs Last 24 Hrs  T(C): 36.9 (2020 10:00), Max: 37 (2020 14:20)  T(F): 98.4 (2020 10:00), Max: 98.6 (2020 14:20)  HR: 69 (2020 10:00) (61 - 71)  BP: 119/69 (2020 10:00) (106/54 - 171/77)  BP(mean): --  RR: 18 (2020 10:00) (17 - 18)  SpO2: 98% (2020 10:00) (97% - 100%)    CONSTITUTIONAL: NAD, well-developed, well-groomed  EYES: PERRLA; conjunctiva and sclera clear  RESPIRATORY: Normal respiratory effort; lungs are clear to auscultation bilaterally  CARDIOVASCULAR: 3/6 systolic murmur heard best at LUSB  ABDOMEN: Nontender to palpation, normoactive bowel sounds, no rebound/guarding  MUSCLOSKELETAL:  No joint swelling or tenderness to palpation  PSYCH: A+O to person, place, poor medical insight   NEUROLOGY: CN 2-12 are intact and symmetric; no gross sensory deficits;   SKIN: No rashes; no palpable lesions    LABS:                        13.7   10.82 )-----------( 185      ( 2020 05:31 )             41.8         142  |  101  |  9   ----------------------------<  151<H>  3.6   |  26  |  0.69    Ca    9.2      2020 09:16  Phos  2.7       Mg     1.5         TPro  6.6  /  Alb  3.9  /  TBili  0.6  /  DBili  x   /  AST  19  /  ALT  12  /  AlkPhos  64            Urinalysis Basic - ( 2020 20:45 )    Color: LIGHT YELLOW / Appearance: Lt TURBID / S.036 / pH: 7.5  Gluc: NEGATIVE / Ketone: NEGATIVE  / Bili: NEGATIVE / Urobili: NORMAL   Blood: TRACE / Protein: 10 / Nitrite: NEGATIVE   Leuk Esterase: LARGE / RBC: 3-5 / WBC >50   Sq Epi: FEW / Non Sq Epi: x / Bacteria: FEW      RADIOLOGY & ADDITIONAL TESTS:  Results Reviewed:   < from: CT Angio Head w/ IV Cont (20 @ 17:27) >  IMPRESSION:     CT brain: No acute intracranial abnormality is noted. If the patient remains symptomatic, consider short interval follow-up head CT or brain MRI follow-up. Chronic right maxillary and sphenoid sinusitis.    CT angiography neck: A short segment dissection involves the distal right common carotid artery with extension to the proximal internal and external carotid arteries. There is no associated significant vascular narrowing. Serial imaging follow-up over time is recommended.    CT angiography brain: No evidence for significant stenosis or major vessel occlusion about the [place].      < end of copied text >

## 2020-01-23 LAB
ANION GAP SERPL CALC-SCNC: 17 MMO/L — HIGH (ref 7–14)
BACTERIA UR CULT: SIGNIFICANT CHANGE UP
BUN SERPL-MCNC: 10 MG/DL — SIGNIFICANT CHANGE UP (ref 7–23)
CALCIUM SERPL-MCNC: 9.3 MG/DL — SIGNIFICANT CHANGE UP (ref 8.4–10.5)
CHLORIDE SERPL-SCNC: 97 MMOL/L — LOW (ref 98–107)
CO2 SERPL-SCNC: 22 MMOL/L — SIGNIFICANT CHANGE UP (ref 22–31)
CREAT SERPL-MCNC: 0.76 MG/DL — SIGNIFICANT CHANGE UP (ref 0.5–1.3)
GLUCOSE SERPL-MCNC: 107 MG/DL — HIGH (ref 70–99)
HCT VFR BLD CALC: 46.3 % — HIGH (ref 34.5–45)
HGB BLD-MCNC: 14.3 G/DL — SIGNIFICANT CHANGE UP (ref 11.5–15.5)
MAGNESIUM SERPL-MCNC: 2.2 MG/DL — SIGNIFICANT CHANGE UP (ref 1.6–2.6)
MCHC RBC-ENTMCNC: 30.9 % — LOW (ref 32–36)
MCHC RBC-ENTMCNC: 31 PG — SIGNIFICANT CHANGE UP (ref 27–34)
MCV RBC AUTO: 100.4 FL — HIGH (ref 80–100)
NRBC # FLD: 0 K/UL — SIGNIFICANT CHANGE UP (ref 0–0)
PLATELET # BLD AUTO: 154 K/UL — SIGNIFICANT CHANGE UP (ref 150–400)
PMV BLD: 10.6 FL — SIGNIFICANT CHANGE UP (ref 7–13)
POTASSIUM SERPL-MCNC: 4.8 MMOL/L — SIGNIFICANT CHANGE UP (ref 3.5–5.3)
POTASSIUM SERPL-SCNC: 4.8 MMOL/L — SIGNIFICANT CHANGE UP (ref 3.5–5.3)
RBC # BLD: 4.61 M/UL — SIGNIFICANT CHANGE UP (ref 3.8–5.2)
RBC # FLD: 14.2 % — SIGNIFICANT CHANGE UP (ref 10.3–14.5)
SODIUM SERPL-SCNC: 136 MMOL/L — SIGNIFICANT CHANGE UP (ref 135–145)
SPECIMEN SOURCE: SIGNIFICANT CHANGE UP
WBC # BLD: 8.88 K/UL — SIGNIFICANT CHANGE UP (ref 3.8–10.5)
WBC # FLD AUTO: 8.88 K/UL — SIGNIFICANT CHANGE UP (ref 3.8–10.5)

## 2020-01-23 PROCEDURE — 99232 SBSQ HOSP IP/OBS MODERATE 35: CPT

## 2020-01-23 RX ORDER — MAGNESIUM SULFATE 500 MG/ML
1 VIAL (ML) INJECTION ONCE
Refills: 0 | Status: DISCONTINUED | OUTPATIENT
Start: 2020-01-23 | End: 2020-01-23

## 2020-01-23 RX ORDER — MAGNESIUM SULFATE 500 MG/ML
1 VIAL (ML) INJECTION ONCE
Refills: 0 | Status: COMPLETED | OUTPATIENT
Start: 2020-01-23 | End: 2020-01-23

## 2020-01-23 RX ADMIN — Medication 1 APPLICATION(S): at 05:25

## 2020-01-23 RX ADMIN — MEMANTINE HYDROCHLORIDE 10 MILLIGRAM(S): 10 TABLET ORAL at 11:39

## 2020-01-23 RX ADMIN — Medication 100 GRAM(S): at 00:54

## 2020-01-23 RX ADMIN — Medication 1 DROP(S): at 05:26

## 2020-01-23 RX ADMIN — Medication 81 MILLIGRAM(S): at 11:39

## 2020-01-23 RX ADMIN — CLOPIDOGREL BISULFATE 75 MILLIGRAM(S): 75 TABLET, FILM COATED ORAL at 11:39

## 2020-01-23 RX ADMIN — GABAPENTIN 300 MILLIGRAM(S): 400 CAPSULE ORAL at 17:53

## 2020-01-23 RX ADMIN — Medication 1 DROP(S): at 17:53

## 2020-01-23 RX ADMIN — CEFTRIAXONE 100 MILLIGRAM(S): 500 INJECTION, POWDER, FOR SOLUTION INTRAMUSCULAR; INTRAVENOUS at 03:07

## 2020-01-23 RX ADMIN — Medication 1 APPLICATION(S): at 12:52

## 2020-01-23 NOTE — SWALLOW BEDSIDE ASSESSMENT ADULT - ASR SWALLOW ASPIRATION MONITOR
oral hygiene/fever/pneumonia/position upright (89Y)/cough/gurgly voice/throat clearing/change of breathing pattern/upper respiratory infection

## 2020-01-23 NOTE — SWALLOW BEDSIDE ASSESSMENT ADULT - SWALLOW EVAL: DIAGNOSIS
1. Functional oral phase for puree consistency, mechanical soft solids and thin liquids marked by adequate mastication for solids, adequate bolus manipulation, adequate anterior to posterior transport and functional oral transit time 2. Mild oral phase dysphagia for regular solids marked by extended/prolonged mastication, reduced bolus manipulation and delay in oral transit time 3. Functional pharyngeal phase for all consistencies presented marked by adequate laryngeal elevation upon digital palpation with NO overt s/s of laryngeal penetration/aspiration noted.

## 2020-01-23 NOTE — PROGRESS NOTE ADULT - PROBLEM SELECTOR PLAN 1
- sudden onset of dizziness and headache while using the commode  - CT scan demonstrative of "A short segment dissection involves the distal right common carotid artery with extension to the proximal internal and external carotid arteries. There is no associated significant vascular narrowing."  - already seen by Neuro-surg and Neurology teams (appreciated), recommendation for aspirin, plavix  - MRI/A attempted yesterday, however pt too agitated.  Pt now s/p PO ativan will reattempt today.   - Discussed with neuro Dr. Libman, if pt can't tolerate MRI/A, OK to dc home without further imaging.  Resume Plavix 3 weeks and ASA, then ASA only indefinitely   - no need for AC presently, per Neuro

## 2020-01-23 NOTE — PROGRESS NOTE ADULT - PROBLEM SELECTOR PLAN 7
1.  PCP Name                   = Fransisco Sanford cardiologist  2.  PCP Contacted at Admission =  [ x  ] Y       [  ] N          [  ] N/A  3.  PCP Contacted at Discharge  =  [  ] Y       [  ] N          [  ] N/A  4.  Post-Discharge Appointment Date and Location =   5.  Summary of Handoff Given to PCP                    = 1.  PCP Name                   = Fransisco Sanford cardiologist  2.  PCP Contacted at Admission =  [ x  ] Y       [  ] N          [  ] N/A  3.  PCP Contacted at Discharge  =  [  ] Y       [  ] N          [  ] N/A  4.  Post-Discharge Appointment Date and Location =   5.  Summary of Handoff Given to PCP                    =    Care discussed with daughter Michelle Sargent

## 2020-01-23 NOTE — SWALLOW BEDSIDE ASSESSMENT ADULT - SWALLOW EVAL: RECOMMENDED FEEDING/EATING TECHNIQUES
maintain upright posture during/after eating for 30 mins/oral hygiene/position upright (90 degrees)/small sips/bites/no straws

## 2020-01-23 NOTE — SWALLOW BEDSIDE ASSESSMENT ADULT - COMMENTS
Patient is a "89 year old female, with past history significant for HTN, dementia, CAD - s/p Stent, Type-II DM, and HLD, presented to the ED secondary to dizziness". CTH completed on 1/20 reported "no acute intracranial abnormality".     Patient seen upright at bedside. Patient in alert/awake state and verbally responsive to some simple questions. Patient unable to follow simple directions though orally receptive to PO trials.

## 2020-01-23 NOTE — PROGRESS NOTE ADULT - PROBLEM SELECTOR PLAN 2
- ECG = NSR at 66 bpm, RBBB, LAD  - New T-wave inversions in the chest leads  - QTc = 478  - last TTE in 10/2018 w/ severe aortic stenosis  - Team discussed with her outpatient cardiologist, outpt TTE and EKG in chart

## 2020-01-23 NOTE — PROGRESS NOTE ADULT - SUBJECTIVE AND OBJECTIVE BOX
[facility] Division of Hospital Medicine  Faisal White DO  Pager (DEN-GIGI, 4K-0Y): [phone]    Patient is a 89y old  Female who presents with a chief complaint of Carotid artery dissection (22 Jan 2020 20:15)      SUBJECTIVE / OVERNIGHT EVENTS: Unable to obtain ROS as pt somnolent.  Pt received 0.5 PO Ativan last evening and having lasting effects, however pt is arousable, protecting airway, saturating well on RA.       MEDICATIONS  (STANDING):  amLODIPine   Tablet 5 milliGRAM(s) Oral daily  aspirin enteric coated 81 milliGRAM(s) Oral daily  atorvastatin 10 milliGRAM(s) Oral at bedtime  cefTRIAXone   IVPB 1000 milliGRAM(s) IV Intermittent every 24 hours  clopidogrel Tablet 75 milliGRAM(s) Oral daily  dextrose 5%. 1000 milliLiter(s) (50 mL/Hr) IV Continuous <Continuous>  dextrose 50% Injectable 12.5 Gram(s) IV Push once  dextrose 50% Injectable 25 Gram(s) IV Push once  dextrose 50% Injectable 25 Gram(s) IV Push once  gabapentin 300 milliGRAM(s) Oral two times a day  insulin lispro (HumaLOG) corrective regimen sliding scale   SubCutaneous three times a day before meals  insulin lispro (HumaLOG) corrective regimen sliding scale   SubCutaneous at bedtime  lactated ringers. 1000 milliLiter(s) (100 mL/Hr) IV Continuous <Continuous>  lisinopril 20 milliGRAM(s) Oral daily  memantine 10 milliGRAM(s) Oral daily  petrolatum white Ointment 1 Application(s) Topical three times a day  timolol 0.5% Solution 1 Drop(s) Both EYES two times a day    MEDICATIONS  (PRN):  dextrose 40% Gel 15 Gram(s) Oral once PRN Blood Glucose LESS THAN 70 milliGRAM(s)/deciliter  glucagon  Injectable 1 milliGRAM(s) IntraMuscular once PRN Glucose LESS THAN 70 milligrams/deciliter      CAPILLARY BLOOD GLUCOSE      POCT Blood Glucose.: 108 mg/dL (23 Jan 2020 12:49)  POCT Blood Glucose.: 105 mg/dL (23 Jan 2020 11:08)  POCT Blood Glucose.: 108 mg/dL (22 Jan 2020 21:30)  POCT Blood Glucose.: 116 mg/dL (22 Jan 2020 20:09)  POCT Blood Glucose.: 81 mg/dL (22 Jan 2020 17:38)    I&O's Summary    22 Jan 2020 07:01  -  23 Jan 2020 07:00  --------------------------------------------------------  IN: 0 mL / OUT: 600 mL / NET: -600 mL        PHYSICAL EXAM:  Vital Signs Last 24 Hrs  T(C): 36.6 (23 Jan 2020 05:24), Max: 37 (22 Jan 2020 20:08)  T(F): 97.8 (23 Jan 2020 05:24), Max: 98.6 (22 Jan 2020 20:08)  HR: 62 (23 Jan 2020 05:24) (62 - 75)  BP: 135/73 (23 Jan 2020 05:24) (106/60 - 153/80)  BP(mean): --  RR: 18 (23 Jan 2020 05:24) (18 - 18)  SpO2: 96% (23 Jan 2020 05:24) (95% - 98%)    CONSTITUTIONAL: NAD, well-developed, well-groomed  EYES: PERRLA; conjunctiva and sclera clear  RESPIRATORY: Normal respiratory effort; lungs are clear to auscultation bilaterally  CARDIOVASCULAR: 3/6 systolic murmur LUSB crescendo/decrescendo   ABDOMEN: Nontender to palpation, normoactive bowel sounds, no rebound/guarding  MUSCLOSKELETAL:  No joint swelling or tenderness to palpation  PSYCH: Unable to assess  NEUROLOGY: No gross motor deficits  SKIN: No rashes; no palpable lesions    LABS:                        14.3   8.88  )-----------( 154      ( 23 Jan 2020 06:00 )             46.3     01-23    136  |  97<L>  |  10  ----------------------------<  107<H>  4.8   |  22  |  0.76    Ca    9.3      23 Jan 2020 06:00  Phos  2.7     01-22  Mg     2.2     01-23    Culture - Urine (collected 21 Jan 2020 17:38)  Source: URINE MIDSTREAM  Final Report (23 Jan 2020 07:23):    NO GROWTH AT 24 HOURS        RADIOLOGY & ADDITIONAL TESTS:  Results Reviewed:   < from: CT Angio Head w/ IV Cont (01.20.20 @ 17:27) >  IMPRESSION:     CT brain: No acute intracranial abnormality is noted. If the patient remains symptomatic, consider short interval follow-up head CT or brain MRI follow-up. Chronic right maxillary and sphenoid sinusitis.    CT angiography neck: A short segment dissection involves the distal right common carotid artery with extension to the proximal internal and external carotid arteries. There is no associated significant vascular narrowing. Serial imaging follow-up over time is recommended.    CT angiography brain: No evidence for significant stenosis or major vessel occlusion about the [place].      < end of copied text >      COORDINATION OF CARE:  Care Discussed with Consultants/Other Providers [Y/N]: Yes Dr. Libman neuro

## 2020-01-24 LAB
ANION GAP SERPL CALC-SCNC: 15 MMO/L — HIGH (ref 7–14)
BUN SERPL-MCNC: 14 MG/DL — SIGNIFICANT CHANGE UP (ref 7–23)
CALCIUM SERPL-MCNC: 9.1 MG/DL — SIGNIFICANT CHANGE UP (ref 8.4–10.5)
CHLORIDE SERPL-SCNC: 96 MMOL/L — LOW (ref 98–107)
CO2 SERPL-SCNC: 26 MMOL/L — SIGNIFICANT CHANGE UP (ref 22–31)
CREAT SERPL-MCNC: 0.9 MG/DL — SIGNIFICANT CHANGE UP (ref 0.5–1.3)
GLUCOSE BLDC GLUCOMTR-MCNC: 137 MG/DL — HIGH (ref 70–99)
GLUCOSE BLDC GLUCOMTR-MCNC: 187 MG/DL — HIGH (ref 70–99)
GLUCOSE BLDC GLUCOMTR-MCNC: 249 MG/DL — HIGH (ref 70–99)
GLUCOSE SERPL-MCNC: 116 MG/DL — HIGH (ref 70–99)
HCT VFR BLD CALC: 40.2 % — SIGNIFICANT CHANGE UP (ref 34.5–45)
HGB BLD-MCNC: 13.1 G/DL — SIGNIFICANT CHANGE UP (ref 11.5–15.5)
MAGNESIUM SERPL-MCNC: 2 MG/DL — SIGNIFICANT CHANGE UP (ref 1.6–2.6)
MCHC RBC-ENTMCNC: 31.2 PG — SIGNIFICANT CHANGE UP (ref 27–34)
MCHC RBC-ENTMCNC: 32.6 % — SIGNIFICANT CHANGE UP (ref 32–36)
MCV RBC AUTO: 95.7 FL — SIGNIFICANT CHANGE UP (ref 80–100)
NRBC # FLD: 0 K/UL — SIGNIFICANT CHANGE UP (ref 0–0)
PLATELET # BLD AUTO: 193 K/UL — SIGNIFICANT CHANGE UP (ref 150–400)
PMV BLD: 10.5 FL — SIGNIFICANT CHANGE UP (ref 7–13)
POTASSIUM SERPL-MCNC: 3.9 MMOL/L — SIGNIFICANT CHANGE UP (ref 3.5–5.3)
POTASSIUM SERPL-SCNC: 3.9 MMOL/L — SIGNIFICANT CHANGE UP (ref 3.5–5.3)
RBC # BLD: 4.2 M/UL — SIGNIFICANT CHANGE UP (ref 3.8–5.2)
RBC # FLD: 14.1 % — SIGNIFICANT CHANGE UP (ref 10.3–14.5)
SODIUM SERPL-SCNC: 137 MMOL/L — SIGNIFICANT CHANGE UP (ref 135–145)
WBC # BLD: 7.1 K/UL — SIGNIFICANT CHANGE UP (ref 3.8–10.5)
WBC # FLD AUTO: 7.1 K/UL — SIGNIFICANT CHANGE UP (ref 3.8–10.5)

## 2020-01-24 PROCEDURE — 99232 SBSQ HOSP IP/OBS MODERATE 35: CPT

## 2020-01-24 RX ORDER — SODIUM CHLORIDE 9 MG/ML
500 INJECTION INTRAMUSCULAR; INTRAVENOUS; SUBCUTANEOUS ONCE
Refills: 0 | Status: COMPLETED | OUTPATIENT
Start: 2020-01-24 | End: 2020-01-24

## 2020-01-24 RX ORDER — SODIUM CHLORIDE 9 MG/ML
250 INJECTION INTRAMUSCULAR; INTRAVENOUS; SUBCUTANEOUS ONCE
Refills: 0 | Status: COMPLETED | OUTPATIENT
Start: 2020-01-24 | End: 2020-01-24

## 2020-01-24 RX ORDER — LISINOPRIL 2.5 MG/1
10 TABLET ORAL DAILY
Refills: 0 | Status: DISCONTINUED | OUTPATIENT
Start: 2020-01-24 | End: 2020-01-31

## 2020-01-24 RX ORDER — HEPARIN SODIUM 5000 [USP'U]/ML
5000 INJECTION INTRAVENOUS; SUBCUTANEOUS EVERY 8 HOURS
Refills: 0 | Status: DISCONTINUED | OUTPATIENT
Start: 2020-01-24 | End: 2020-01-31

## 2020-01-24 RX ADMIN — HEPARIN SODIUM 5000 UNIT(S): 5000 INJECTION INTRAVENOUS; SUBCUTANEOUS at 22:05

## 2020-01-24 RX ADMIN — Medication 1 DROP(S): at 06:26

## 2020-01-24 RX ADMIN — GABAPENTIN 300 MILLIGRAM(S): 400 CAPSULE ORAL at 06:26

## 2020-01-24 RX ADMIN — SODIUM CHLORIDE 166.67 MILLILITER(S): 9 INJECTION INTRAMUSCULAR; INTRAVENOUS; SUBCUTANEOUS at 10:12

## 2020-01-24 RX ADMIN — Medication 1 APPLICATION(S): at 13:14

## 2020-01-24 RX ADMIN — SODIUM CHLORIDE 166.67 MILLILITER(S): 9 INJECTION INTRAMUSCULAR; INTRAVENOUS; SUBCUTANEOUS at 17:39

## 2020-01-24 RX ADMIN — Medication 1 APPLICATION(S): at 06:28

## 2020-01-24 RX ADMIN — CLOPIDOGREL BISULFATE 75 MILLIGRAM(S): 75 TABLET, FILM COATED ORAL at 10:12

## 2020-01-24 RX ADMIN — Medication 2: at 13:13

## 2020-01-24 RX ADMIN — AMLODIPINE BESYLATE 5 MILLIGRAM(S): 2.5 TABLET ORAL at 06:25

## 2020-01-24 RX ADMIN — Medication 81 MILLIGRAM(S): at 10:12

## 2020-01-24 RX ADMIN — ATORVASTATIN CALCIUM 10 MILLIGRAM(S): 80 TABLET, FILM COATED ORAL at 21:40

## 2020-01-24 RX ADMIN — LISINOPRIL 20 MILLIGRAM(S): 2.5 TABLET ORAL at 06:25

## 2020-01-24 RX ADMIN — CEFTRIAXONE 100 MILLIGRAM(S): 500 INJECTION, POWDER, FOR SOLUTION INTRAMUSCULAR; INTRAVENOUS at 02:48

## 2020-01-24 RX ADMIN — MEMANTINE HYDROCHLORIDE 10 MILLIGRAM(S): 10 TABLET ORAL at 10:12

## 2020-01-24 RX ADMIN — GABAPENTIN 300 MILLIGRAM(S): 400 CAPSULE ORAL at 17:39

## 2020-01-24 RX ADMIN — Medication 1 DROP(S): at 21:38

## 2020-01-24 RX ADMIN — Medication 1 APPLICATION(S): at 21:38

## 2020-01-24 NOTE — CHART NOTE - NSCHARTNOTEFT_GEN_A_CORE
Patient hypotensive this an and intermittently throughout day.   Vital Signs Last 24 Hrs  T(C): 36.4 (24 Jan 2020 15:39), Max: 36.4 (23 Jan 2020 19:20)  T(F): 97.5 (24 Jan 2020 15:39), Max: 97.5 (23 Jan 2020 19:20)  HR: 80 (24 Jan 2020 15:39) (66 - 80)  BP: 87/51 (24 Jan 2020 15:39) (80/49 - 121/73)*********  RR: 16 (24 Jan 2020 15:39) (15 - 18)  SpO2: 97% (24 Jan 2020 15:39) (97% - 100%)    Patient mentating appropriately, slightly sleepy however as per family she is usually sleepy throughout the day.   Patient comfortable without any complaints of pain and or shortness of breath.   NS bolus given throughout day x 2. Dr. baldwin aware.    Trend vitals q 4.     Will continue to closely monitor.

## 2020-01-24 NOTE — PROGRESS NOTE ADULT - ASSESSMENT
89 year old female, with past history significant for HTN, CAD s/p Stent, Type-II DM, and HLD, presented to the ED secondary to dizziness, pt found to have R carotid artery dissection. no intervention per neurosurgery. medical management per neuro and neurosurgery.

## 2020-01-24 NOTE — PROGRESS NOTE ADULT - SUBJECTIVE AND OBJECTIVE BOX
[facility] Division of Hospital Medicine  Marcial Persaud MD  In House Pager [phone]    Patient is a 89y old  Female who presents with a chief complaint of Carotid artery dissection (23 Jan 2020 14:35)      SUBJECTIVE / OVERNIGHT EVENTS:  No overnight events. Patient seen and examined at bedside, labs and vitals reviewed.    No fever, no chills, no SOB, no CP, no n/v/d, no abd pain, no dysuria      MEDICATIONS  (STANDING):  aspirin enteric coated 81 milliGRAM(s) Oral daily  atorvastatin 10 milliGRAM(s) Oral at bedtime  clopidogrel Tablet 75 milliGRAM(s) Oral daily  dextrose 5%. 1000 milliLiter(s) (50 mL/Hr) IV Continuous <Continuous>  dextrose 50% Injectable 12.5 Gram(s) IV Push once  dextrose 50% Injectable 25 Gram(s) IV Push once  dextrose 50% Injectable 25 Gram(s) IV Push once  gabapentin 300 milliGRAM(s) Oral two times a day  insulin lispro (HumaLOG) corrective regimen sliding scale   SubCutaneous three times a day before meals  insulin lispro (HumaLOG) corrective regimen sliding scale   SubCutaneous at bedtime  lisinopril 20 milliGRAM(s) Oral daily  memantine 10 milliGRAM(s) Oral daily  petrolatum white Ointment 1 Application(s) Topical three times a day  timolol 0.5% Solution 1 Drop(s) Both EYES two times a day    MEDICATIONS  (PRN):  dextrose 40% Gel 15 Gram(s) Oral once PRN Blood Glucose LESS THAN 70 milliGRAM(s)/deciliter  glucagon  Injectable 1 milliGRAM(s) IntraMuscular once PRN Glucose LESS THAN 70 milligrams/deciliter      CAPILLARY BLOOD GLUCOSE      POCT Blood Glucose.: 187 mg/dL (24 Jan 2020 12:54)  POCT Blood Glucose.: 113 mg/dL (24 Jan 2020 09:06)  POCT Blood Glucose.: 126 mg/dL (23 Jan 2020 21:31)  POCT Blood Glucose.: 146 mg/dL (23 Jan 2020 17:43)    I&O's Summary    23 Jan 2020 07:01  -  24 Jan 2020 07:00  --------------------------------------------------------  IN: 0 mL / OUT: 500 mL / NET: -500 mL        PHYSICAL EXAM:  Vital Signs Last 24 Hrs  T(C): 36.2 (24 Jan 2020 03:20), Max: 36.7 (23 Jan 2020 15:20)  T(F): 97.1 (24 Jan 2020 03:20), Max: 98.1 (23 Jan 2020 15:20)  HR: 69 (24 Jan 2020 12:09) (66 - 77)  BP: 98/56 (24 Jan 2020 12:09) (80/49 - 121/73)  BP(mean): --  RR: 15 (24 Jan 2020 11:50) (15 - 18)  SpO2: 97% (24 Jan 2020 11:50) (97% - 100%)    CONSTITUTIONAL: NAD, well-developed, well-groomed  EYES: PERRLA; conjunctiva and sclera clear  RESPIRATORY: Normal respiratory effort; lungs are clear to auscultation bilaterally  CARDIOVASCULAR: 3/6 systolic murmur LUSB crescendo/decrescendo   ABDOMEN: Nontender to palpation, normoactive bowel sounds, no rebound/guarding  MUSCLOSKELETAL:  No joint swelling or tenderness to palpation  PSYCH: Unable to assess  NEUROLOGY: No gross motor deficits  SKIN: No rashes; no palpable lesions    LABS:                        13.1   7.10  )-----------( 193      ( 24 Jan 2020 06:30 )             40.2     01-24    137  |  96<L>  |  14  ----------------------------<  116<H>  3.9   |  26  |  0.90    Ca    9.1      24 Jan 2020 06:30  Mg     2.0     01-24                Culture - Urine (collected 21 Jan 2020 17:38)  Source: URINE MIDSTREAM  Final Report (23 Jan 2020 07:23):    NO GROWTH AT 24 HOURS        RADIOLOGY & ADDITIONAL TESTS:  Results Reviewed:   Imaging Personally Reviewed:  Electrocardiogram Personally Reviewed:    COORDINATION OF CARE:  Care Discussed with Consultants/Other Providers [Y/N]:  Prior or Outpatient Records Reviewed [Y/N]:

## 2020-01-25 DIAGNOSIS — I95.2 HYPOTENSION DUE TO DRUGS: ICD-10-CM

## 2020-01-25 DIAGNOSIS — I47.2 VENTRICULAR TACHYCARDIA: ICD-10-CM

## 2020-01-25 LAB
ANION GAP SERPL CALC-SCNC: 10 MMO/L — SIGNIFICANT CHANGE UP (ref 7–14)
BUN SERPL-MCNC: 22 MG/DL — SIGNIFICANT CHANGE UP (ref 7–23)
CALCIUM SERPL-MCNC: 8.7 MG/DL — SIGNIFICANT CHANGE UP (ref 8.4–10.5)
CHLORIDE SERPL-SCNC: 102 MMOL/L — SIGNIFICANT CHANGE UP (ref 98–107)
CO2 SERPL-SCNC: 27 MMOL/L — SIGNIFICANT CHANGE UP (ref 22–31)
CREAT SERPL-MCNC: 0.79 MG/DL — SIGNIFICANT CHANGE UP (ref 0.5–1.3)
GLUCOSE BLDC GLUCOMTR-MCNC: 130 MG/DL — HIGH (ref 70–99)
GLUCOSE BLDC GLUCOMTR-MCNC: 181 MG/DL — HIGH (ref 70–99)
GLUCOSE BLDC GLUCOMTR-MCNC: 209 MG/DL — HIGH (ref 70–99)
GLUCOSE BLDC GLUCOMTR-MCNC: 261 MG/DL — HIGH (ref 70–99)
GLUCOSE SERPL-MCNC: 155 MG/DL — HIGH (ref 70–99)
HCT VFR BLD CALC: 38.1 % — SIGNIFICANT CHANGE UP (ref 34.5–45)
HGB BLD-MCNC: 12.5 G/DL — SIGNIFICANT CHANGE UP (ref 11.5–15.5)
MAGNESIUM SERPL-MCNC: 1.8 MG/DL — SIGNIFICANT CHANGE UP (ref 1.6–2.6)
MCHC RBC-ENTMCNC: 31.2 PG — SIGNIFICANT CHANGE UP (ref 27–34)
MCHC RBC-ENTMCNC: 32.8 % — SIGNIFICANT CHANGE UP (ref 32–36)
MCV RBC AUTO: 95 FL — SIGNIFICANT CHANGE UP (ref 80–100)
NRBC # FLD: 0 K/UL — SIGNIFICANT CHANGE UP (ref 0–0)
PLATELET # BLD AUTO: 191 K/UL — SIGNIFICANT CHANGE UP (ref 150–400)
PMV BLD: 10.4 FL — SIGNIFICANT CHANGE UP (ref 7–13)
POTASSIUM SERPL-MCNC: 4.1 MMOL/L — SIGNIFICANT CHANGE UP (ref 3.5–5.3)
POTASSIUM SERPL-SCNC: 4.1 MMOL/L — SIGNIFICANT CHANGE UP (ref 3.5–5.3)
RBC # BLD: 4.01 M/UL — SIGNIFICANT CHANGE UP (ref 3.8–5.2)
RBC # FLD: 14 % — SIGNIFICANT CHANGE UP (ref 10.3–14.5)
SODIUM SERPL-SCNC: 139 MMOL/L — SIGNIFICANT CHANGE UP (ref 135–145)
WBC # BLD: 6.79 K/UL — SIGNIFICANT CHANGE UP (ref 3.8–10.5)
WBC # FLD AUTO: 6.79 K/UL — SIGNIFICANT CHANGE UP (ref 3.8–10.5)

## 2020-01-25 PROCEDURE — 93306 TTE W/DOPPLER COMPLETE: CPT | Mod: 26

## 2020-01-25 PROCEDURE — 99232 SBSQ HOSP IP/OBS MODERATE 35: CPT

## 2020-01-25 RX ORDER — METOPROLOL TARTRATE 50 MG
12.5 TABLET ORAL
Refills: 0 | Status: DISCONTINUED | OUTPATIENT
Start: 2020-01-25 | End: 2020-01-26

## 2020-01-25 RX ADMIN — Medication 81 MILLIGRAM(S): at 12:15

## 2020-01-25 RX ADMIN — Medication 1 APPLICATION(S): at 22:26

## 2020-01-25 RX ADMIN — Medication 12.5 MILLIGRAM(S): at 17:25

## 2020-01-25 RX ADMIN — Medication 6: at 17:22

## 2020-01-25 RX ADMIN — HEPARIN SODIUM 5000 UNIT(S): 5000 INJECTION INTRAVENOUS; SUBCUTANEOUS at 12:16

## 2020-01-25 RX ADMIN — Medication 1 DROP(S): at 17:26

## 2020-01-25 RX ADMIN — CLOPIDOGREL BISULFATE 75 MILLIGRAM(S): 75 TABLET, FILM COATED ORAL at 12:15

## 2020-01-25 RX ADMIN — GABAPENTIN 300 MILLIGRAM(S): 400 CAPSULE ORAL at 05:26

## 2020-01-25 RX ADMIN — LISINOPRIL 10 MILLIGRAM(S): 2.5 TABLET ORAL at 05:26

## 2020-01-25 RX ADMIN — ATORVASTATIN CALCIUM 10 MILLIGRAM(S): 80 TABLET, FILM COATED ORAL at 22:27

## 2020-01-25 RX ADMIN — GABAPENTIN 300 MILLIGRAM(S): 400 CAPSULE ORAL at 17:22

## 2020-01-25 RX ADMIN — Medication 1 DROP(S): at 05:26

## 2020-01-25 RX ADMIN — MEMANTINE HYDROCHLORIDE 10 MILLIGRAM(S): 10 TABLET ORAL at 12:15

## 2020-01-25 RX ADMIN — Medication 1 APPLICATION(S): at 05:27

## 2020-01-25 RX ADMIN — HEPARIN SODIUM 5000 UNIT(S): 5000 INJECTION INTRAVENOUS; SUBCUTANEOUS at 05:25

## 2020-01-25 RX ADMIN — Medication 1 APPLICATION(S): at 12:16

## 2020-01-25 RX ADMIN — Medication 2: at 12:15

## 2020-01-25 NOTE — PROGRESS NOTE ADULT - PROBLEM SELECTOR PLAN 1
- sudden onset of dizziness and headache while using the commode  - CT scan demonstrative of "A short segment dissection involves the distal right common carotid artery with extension to the proximal internal and external carotid arteries. There is no associated significant vascular narrowing."  - already seen by Neuro-surg and Neurology teams (appreciated), recommendation for aspirin, plavix  - MRI/A attempted yesterday, however pt too agitated.  Pt now s/p PO ativan will reattempt today.   - Discussed with neuro Dr. Libman, if pt can't tolerate MRI/A, OK to dc home without further imaging.  Resume Plavix 3 weeks and ASA, then ASA only indefinitely   - no need for AC presently, per Neuro - sudden onset of dizziness and headache while using the commode  - CT scan demonstrative of "A short segment dissection involves the distal right common carotid artery with extension to the proximal internal and external carotid arteries. There is no associated significant vascular narrowing."  - already seen by Neuro-surg and Neurology teams (appreciated), recommendation for aspirin, plavix  - MRI/A attempted yesterday, however pt too agitated.  Pt now s/p PO ativan will reattempt today.   - Discussed with neuro Dr. Libman, if pt can't tolerate MRI/A, OK to dc home without further imaging.  Resume Plavix 3 weeks and ASA, then ASA only indefinitely   - no need for AC presently, per Neuro  - still pending MRI/A. need pre-medication prior to imaging.

## 2020-01-25 NOTE — CHART NOTE - NSCHARTNOTEFT_GEN_A_CORE
PA note   Called by nurse for 13 beats of NSVT seen on tele     PT is asymptomatic  Vital Signs Last 24 Hrs  T(C): 36.6 (25 Jan 2020 05:13), Max: 36.6 (25 Jan 2020 05:13)  T(F): 97.8 (25 Jan 2020 05:13), Max: 97.8 (25 Jan 2020 05:13)  HR: 73 (25 Jan 2020 05:13) (66 - 82)  BP: 125/63 (25 Jan 2020 05:13) (80/49 - 125/63)  RR: 17 (25 Jan 2020 05:13) (15 - 18)  SpO2: 98% (25 Jan 2020 05:13) (97% - 98%)    Nonsustained V-Tach   Echo Test Ordered  Caffeine free diet ordered  consider adding Beta Blocker  Monitor electrolytes, Will keep K > 4.0  & Mg > 2   Will continue to monitor

## 2020-01-25 NOTE — PROGRESS NOTE ADULT - PROBLEM SELECTOR PLAN 7
- BP to 200/99 max  - on lisinopril 20 mg and amlodipine 5 mg at last admission  - slight hypotensive this morning and patient was more fatigue.   - c/w lisinopril 20mg, d/c amlodipine.   - monitor BP.

## 2020-01-25 NOTE — PROGRESS NOTE ADULT - PROBLEM SELECTOR PLAN 2
13 beats of NSVT overnight  Metoprolol was held on admission  will restart at lower dose given labile BP yesterday.   monitor hemodynamics.   c/w tele monitoring.

## 2020-01-25 NOTE — PROGRESS NOTE ADULT - SUBJECTIVE AND OBJECTIVE BOX
[facility] Division of Hospital Medicine  Marcial Persaud MD  In House Pager [phone]    Patient is a 89y old  Female who presents with a chief complaint of Carotid artery dissection (24 Jan 2020 14:14)      SUBJECTIVE / OVERNIGHT EVENTS:  Patient hypotensive yesterday PM and overnight. maintaining mental status. BP imporved overnight. NSVT on tele.  Patient seen and examined at bedside, labs and vitals reviewed.  reports feeling tired, otherwise no new symptoms.   No fever, no chills, no SOB, no CP, no n/v/d, no abd pain, no dysuria      MEDICATIONS  (STANDING):  aspirin enteric coated 81 milliGRAM(s) Oral daily  atorvastatin 10 milliGRAM(s) Oral at bedtime  clopidogrel Tablet 75 milliGRAM(s) Oral daily  dextrose 5%. 1000 milliLiter(s) (50 mL/Hr) IV Continuous <Continuous>  dextrose 50% Injectable 12.5 Gram(s) IV Push once  dextrose 50% Injectable 25 Gram(s) IV Push once  dextrose 50% Injectable 25 Gram(s) IV Push once  gabapentin 300 milliGRAM(s) Oral two times a day  heparin  Injectable 5000 Unit(s) SubCutaneous every 8 hours  insulin lispro (HumaLOG) corrective regimen sliding scale   SubCutaneous three times a day before meals  insulin lispro (HumaLOG) corrective regimen sliding scale   SubCutaneous at bedtime  lisinopril 10 milliGRAM(s) Oral daily  memantine 10 milliGRAM(s) Oral daily  metoprolol tartrate 12.5 milliGRAM(s) Oral two times a day  petrolatum white Ointment 1 Application(s) Topical three times a day  timolol 0.5% Solution 1 Drop(s) Both EYES two times a day    MEDICATIONS  (PRN):  dextrose 40% Gel 15 Gram(s) Oral once PRN Blood Glucose LESS THAN 70 milliGRAM(s)/deciliter  glucagon  Injectable 1 milliGRAM(s) IntraMuscular once PRN Glucose LESS THAN 70 milligrams/deciliter      CAPILLARY BLOOD GLUCOSE      POCT Blood Glucose.: 249 mg/dL (24 Jan 2020 21:46)  POCT Blood Glucose.: 137 mg/dL (24 Jan 2020 17:38)  POCT Blood Glucose.: 187 mg/dL (24 Jan 2020 12:54)  POCT Blood Glucose.: 113 mg/dL (24 Jan 2020 09:06)    I&O's Summary    24 Jan 2020 07:01  -  25 Jan 2020 07:00  --------------------------------------------------------  IN: 100 mL / OUT: 300 mL / NET: -200 mL        PHYSICAL EXAM:  Vital Signs Last 24 Hrs  T(C): 36.6 (25 Jan 2020 05:13), Max: 36.6 (25 Jan 2020 05:13)  T(F): 97.8 (25 Jan 2020 05:13), Max: 97.8 (25 Jan 2020 05:13)  HR: 73 (25 Jan 2020 05:13) (66 - 82)  BP: 125/63 (25 Jan 2020 05:13) (80/49 - 125/63)  BP(mean): --  RR: 17 (25 Jan 2020 05:13) (15 - 17)  SpO2: 98% (25 Jan 2020 05:13) (97% - 98%)    CONSTITUTIONAL: NAD, well-developed, well-groomed  EYES: PERRLA; conjunctiva and sclera clear  RESPIRATORY: Normal respiratory effort; lungs are clear to auscultation bilaterally  CARDIOVASCULAR: 3/6 systolic murmur LUSB crescendo/decrescendo   ABDOMEN: Nontender to palpation, normoactive bowel sounds, no rebound/guarding  MUSCLOSKELETAL:  No joint swelling or tenderness to palpation  PSYCH: Unable to assess  NEUROLOGY: No gross motor deficits  SKIN: No rashes; no palpable lesions    LABS:                        12.5   6.79  )-----------( 191      ( 25 Jan 2020 07:20 )             38.1     01-25    139  |  102  |  22  ----------------------------<  155<H>  4.1   |  27  |  0.79    Ca    8.7      25 Jan 2020 07:20  Mg     1.8     01-25                  RADIOLOGY & ADDITIONAL TESTS:  Results Reviewed:   Imaging Personally Reviewed:  Electrocardiogram Personally Reviewed:    COORDINATION OF CARE:  Care Discussed with Consultants/Other Providers [Y/N]:  Prior or Outpatient Records Reviewed [Y/N]:

## 2020-01-26 LAB
ANION GAP SERPL CALC-SCNC: 12 MMO/L — SIGNIFICANT CHANGE UP (ref 7–14)
BUN SERPL-MCNC: 20 MG/DL — SIGNIFICANT CHANGE UP (ref 7–23)
CALCIUM SERPL-MCNC: 8.5 MG/DL — SIGNIFICANT CHANGE UP (ref 8.4–10.5)
CHLORIDE SERPL-SCNC: 103 MMOL/L — SIGNIFICANT CHANGE UP (ref 98–107)
CO2 SERPL-SCNC: 25 MMOL/L — SIGNIFICANT CHANGE UP (ref 22–31)
CREAT SERPL-MCNC: 0.68 MG/DL — SIGNIFICANT CHANGE UP (ref 0.5–1.3)
GLUCOSE BLDC GLUCOMTR-MCNC: 152 MG/DL — HIGH (ref 70–99)
GLUCOSE BLDC GLUCOMTR-MCNC: 178 MG/DL — HIGH (ref 70–99)
GLUCOSE BLDC GLUCOMTR-MCNC: 205 MG/DL — HIGH (ref 70–99)
GLUCOSE BLDC GLUCOMTR-MCNC: 274 MG/DL — HIGH (ref 70–99)
GLUCOSE SERPL-MCNC: 174 MG/DL — HIGH (ref 70–99)
HCT VFR BLD CALC: 34.9 % — SIGNIFICANT CHANGE UP (ref 34.5–45)
HGB BLD-MCNC: 11.1 G/DL — LOW (ref 11.5–15.5)
MAGNESIUM SERPL-MCNC: 1.7 MG/DL — SIGNIFICANT CHANGE UP (ref 1.6–2.6)
MCHC RBC-ENTMCNC: 30.2 PG — SIGNIFICANT CHANGE UP (ref 27–34)
MCHC RBC-ENTMCNC: 31.8 % — LOW (ref 32–36)
MCV RBC AUTO: 94.8 FL — SIGNIFICANT CHANGE UP (ref 80–100)
NRBC # FLD: 0 K/UL — SIGNIFICANT CHANGE UP (ref 0–0)
PLATELET # BLD AUTO: 195 K/UL — SIGNIFICANT CHANGE UP (ref 150–400)
PMV BLD: 10.1 FL — SIGNIFICANT CHANGE UP (ref 7–13)
POTASSIUM SERPL-MCNC: 3.5 MMOL/L — SIGNIFICANT CHANGE UP (ref 3.5–5.3)
POTASSIUM SERPL-SCNC: 3.5 MMOL/L — SIGNIFICANT CHANGE UP (ref 3.5–5.3)
RBC # BLD: 3.68 M/UL — LOW (ref 3.8–5.2)
RBC # FLD: 13.9 % — SIGNIFICANT CHANGE UP (ref 10.3–14.5)
SODIUM SERPL-SCNC: 140 MMOL/L — SIGNIFICANT CHANGE UP (ref 135–145)
WBC # BLD: 5.47 K/UL — SIGNIFICANT CHANGE UP (ref 3.8–10.5)
WBC # FLD AUTO: 5.47 K/UL — SIGNIFICANT CHANGE UP (ref 3.8–10.5)

## 2020-01-26 PROCEDURE — 99232 SBSQ HOSP IP/OBS MODERATE 35: CPT

## 2020-01-26 PROCEDURE — 70551 MRI BRAIN STEM W/O DYE: CPT | Mod: 26

## 2020-01-26 PROCEDURE — 70547 MR ANGIOGRAPHY NECK W/O DYE: CPT | Mod: 26

## 2020-01-26 PROCEDURE — 70544 MR ANGIOGRAPHY HEAD W/O DYE: CPT | Mod: 26,59

## 2020-01-26 RX ORDER — METOPROLOL TARTRATE 50 MG
25 TABLET ORAL
Refills: 0 | Status: DISCONTINUED | OUTPATIENT
Start: 2020-01-26 | End: 2020-01-31

## 2020-01-26 RX ORDER — METOPROLOL TARTRATE 50 MG
12.5 TABLET ORAL ONCE
Refills: 0 | Status: COMPLETED | OUTPATIENT
Start: 2020-01-26 | End: 2020-01-26

## 2020-01-26 RX ORDER — POTASSIUM CHLORIDE 20 MEQ
40 PACKET (EA) ORAL ONCE
Refills: 0 | Status: COMPLETED | OUTPATIENT
Start: 2020-01-26 | End: 2020-01-26

## 2020-01-26 RX ORDER — MAGNESIUM OXIDE 400 MG ORAL TABLET 241.3 MG
400 TABLET ORAL
Refills: 0 | Status: COMPLETED | OUTPATIENT
Start: 2020-01-26 | End: 2020-01-26

## 2020-01-26 RX ADMIN — Medication 1 APPLICATION(S): at 22:00

## 2020-01-26 RX ADMIN — Medication 12.5 MILLIGRAM(S): at 08:43

## 2020-01-26 RX ADMIN — Medication 12.5 MILLIGRAM(S): at 04:10

## 2020-01-26 RX ADMIN — Medication 1 APPLICATION(S): at 04:11

## 2020-01-26 RX ADMIN — Medication 2: at 08:46

## 2020-01-26 RX ADMIN — MAGNESIUM OXIDE 400 MG ORAL TABLET 400 MILLIGRAM(S): 241.3 TABLET ORAL at 08:44

## 2020-01-26 RX ADMIN — HEPARIN SODIUM 5000 UNIT(S): 5000 INJECTION INTRAVENOUS; SUBCUTANEOUS at 13:06

## 2020-01-26 RX ADMIN — MAGNESIUM OXIDE 400 MG ORAL TABLET 400 MILLIGRAM(S): 241.3 TABLET ORAL at 13:05

## 2020-01-26 RX ADMIN — Medication 25 MILLIGRAM(S): at 17:18

## 2020-01-26 RX ADMIN — LISINOPRIL 10 MILLIGRAM(S): 2.5 TABLET ORAL at 04:11

## 2020-01-26 RX ADMIN — MEMANTINE HYDROCHLORIDE 10 MILLIGRAM(S): 10 TABLET ORAL at 08:45

## 2020-01-26 RX ADMIN — Medication 1 DROP(S): at 17:18

## 2020-01-26 RX ADMIN — GABAPENTIN 300 MILLIGRAM(S): 400 CAPSULE ORAL at 17:17

## 2020-01-26 RX ADMIN — Medication 40 MILLIEQUIVALENT(S): at 08:43

## 2020-01-26 RX ADMIN — Medication 0.5 MILLIGRAM(S): at 17:51

## 2020-01-26 RX ADMIN — ATORVASTATIN CALCIUM 10 MILLIGRAM(S): 80 TABLET, FILM COATED ORAL at 20:27

## 2020-01-26 RX ADMIN — Medication 1 APPLICATION(S): at 13:07

## 2020-01-26 RX ADMIN — MAGNESIUM OXIDE 400 MG ORAL TABLET 400 MILLIGRAM(S): 241.3 TABLET ORAL at 17:18

## 2020-01-26 RX ADMIN — GABAPENTIN 300 MILLIGRAM(S): 400 CAPSULE ORAL at 04:11

## 2020-01-26 RX ADMIN — Medication 6: at 13:05

## 2020-01-26 RX ADMIN — Medication 4: at 17:31

## 2020-01-26 RX ADMIN — CLOPIDOGREL BISULFATE 75 MILLIGRAM(S): 75 TABLET, FILM COATED ORAL at 08:45

## 2020-01-26 RX ADMIN — Medication 1 DROP(S): at 04:11

## 2020-01-26 RX ADMIN — Medication 81 MILLIGRAM(S): at 08:45

## 2020-01-26 NOTE — PROGRESS NOTE ADULT - PROBLEM SELECTOR PLAN 2
13 beats of NSVT overnight  Metoprolol was held on admission  will restart at lower dose given labile BP yesterday.   monitor hemodynamics.   c/w tele monitoring. 2 events of short NSVT overnight  increase metoprolol to 25mg bid (home dose).  monitor hemodynamics.   c/w tele monitoring.

## 2020-01-26 NOTE — PROGRESS NOTE ADULT - PROBLEM SELECTOR PLAN 1
- sudden onset of dizziness and headache while using the commode  - CT scan demonstrative of "A short segment dissection involves the distal right common carotid artery with extension to the proximal internal and external carotid arteries. There is no associated significant vascular narrowing."  - already seen by Neuro-surg and Neurology teams (appreciated), recommendation for aspirin, plavix  - MRI/A attempted yesterday, however pt too agitated.  Pt now s/p PO ativan will reattempt today.   - Discussed with neuro Dr. Libman, if pt can't tolerate MRI/A, OK to dc home without further imaging.  Resume Plavix 3 weeks and ASA, then ASA only indefinitely   - no need for AC presently, per Neuro  - still pending MRI/A. need pre-medication prior to imaging.

## 2020-01-26 NOTE — PROGRESS NOTE ADULT - ASSESSMENT
89 year old female, with past history significant for HTN, CAD s/p Stent, Type-II DM, and HLD, presented to the ED secondary to dizziness, pt found to have R carotid artery dissection. no intervention per neurosurgery. medical management per neuro and neurosurgery. 89 year old female, with past history significant for HTN, CAD s/p Stent, Type-II DM, and HLD, presented to the ED secondary to dizziness, pt found to have R carotid artery dissection. no intervention per neurosurgery. medical management per neuro and neurosurgery. plan to discharge to [facility].

## 2020-01-26 NOTE — PROGRESS NOTE ADULT - SUBJECTIVE AND OBJECTIVE BOX
[facility] Division of Hospital Medicine  Marcial Persaud MD  In House Pager [phone]    Patient is a 89y old  Female who presents with a chief complaint of Carotid artery dissection (25 Jan 2020 08:38)      SUBJECTIVE / OVERNIGHT EVENTS:  NSVT overnight. Patient seen and examined at bedside, labs and vitals reviewed.  no complaints.   No fever, no chills, no SOB, no CP, no n/v/d, no abd pain, no dysuria      MEDICATIONS  (STANDING):  aspirin enteric coated 81 milliGRAM(s) Oral daily  atorvastatin 10 milliGRAM(s) Oral at bedtime  clopidogrel Tablet 75 milliGRAM(s) Oral daily  dextrose 5%. 1000 milliLiter(s) (50 mL/Hr) IV Continuous <Continuous>  dextrose 50% Injectable 12.5 Gram(s) IV Push once  dextrose 50% Injectable 25 Gram(s) IV Push once  dextrose 50% Injectable 25 Gram(s) IV Push once  gabapentin 300 milliGRAM(s) Oral two times a day  heparin  Injectable 5000 Unit(s) SubCutaneous every 8 hours  insulin lispro (HumaLOG) corrective regimen sliding scale   SubCutaneous three times a day before meals  insulin lispro (HumaLOG) corrective regimen sliding scale   SubCutaneous at bedtime  lisinopril 10 milliGRAM(s) Oral daily  magnesium oxide 400 milliGRAM(s) Oral three times a day with meals  memantine 10 milliGRAM(s) Oral daily  metoprolol tartrate 12.5 milliGRAM(s) Oral two times a day  petrolatum white Ointment 1 Application(s) Topical three times a day  potassium chloride   Powder 40 milliEquivalent(s) Oral once  timolol 0.5% Solution 1 Drop(s) Both EYES two times a day    MEDICATIONS  (PRN):  dextrose 40% Gel 15 Gram(s) Oral once PRN Blood Glucose LESS THAN 70 milliGRAM(s)/deciliter  glucagon  Injectable 1 milliGRAM(s) IntraMuscular once PRN Glucose LESS THAN 70 milligrams/deciliter      CAPILLARY BLOOD GLUCOSE      POCT Blood Glucose.: 209 mg/dL (25 Jan 2020 21:13)  POCT Blood Glucose.: 261 mg/dL (25 Jan 2020 17:08)  POCT Blood Glucose.: 181 mg/dL (25 Jan 2020 11:50)  POCT Blood Glucose.: 130 mg/dL (25 Jan 2020 09:06)    I&O's Summary    25 Jan 2020 07:01  -  26 Jan 2020 07:00  --------------------------------------------------------  IN: 400 mL / OUT: 400 mL / NET: 0 mL        PHYSICAL EXAM:  Vital Signs Last 24 Hrs  T(C): 36.6 (26 Jan 2020 04:08), Max: 36.7 (25 Jan 2020 09:50)  T(F): 97.9 (26 Jan 2020 04:08), Max: 98 (25 Jan 2020 09:50)  HR: 70 (26 Jan 2020 04:08) (68 - 82)  BP: 137/84 (26 Jan 2020 04:08) (108/63 - 137/84)  BP(mean): --  RR: 18 (26 Jan 2020 04:08) (16 - 18)  SpO2: 98% (26 Jan 2020 04:08) (97% - 98%)    CONSTITUTIONAL: NAD, well-developed, well-groomed  EYES: PERRLA; conjunctiva and sclera clear  RESPIRATORY: Normal respiratory effort; lungs are clear to auscultation bilaterally  CARDIOVASCULAR: 3/6 systolic murmur LUSB crescendo/decrescendo   ABDOMEN: Nontender to palpation, normoactive bowel sounds, no rebound/guarding  MUSCLOSKELETAL:  No joint swelling or tenderness to palpation  PSYCH: Unable to assess  NEUROLOGY: No gross motor deficits  SKIN: No rashes; no palpable lesions    LABS:                        11.1   5.47  )-----------( 195      ( 26 Jan 2020 04:00 )             34.9     01-26    140  |  103  |  20  ----------------------------<  174<H>  3.5   |  25  |  0.68    Ca    8.5      26 Jan 2020 04:00  Mg     1.7     01-26                  RADIOLOGY & ADDITIONAL TESTS:  Results Reviewed:   Imaging Personally Reviewed:  Electrocardiogram Personally Reviewed:    COORDINATION OF CARE:  Care Discussed with Consultants/Other Providers [Y/N]:  Prior or Outpatient Records Reviewed [Y/N]:

## 2020-01-27 LAB
ANION GAP SERPL CALC-SCNC: 12 MMO/L — SIGNIFICANT CHANGE UP (ref 7–14)
BUN SERPL-MCNC: 18 MG/DL — SIGNIFICANT CHANGE UP (ref 7–23)
CALCIUM SERPL-MCNC: 9.1 MG/DL — SIGNIFICANT CHANGE UP (ref 8.4–10.5)
CHLORIDE SERPL-SCNC: 105 MMOL/L — SIGNIFICANT CHANGE UP (ref 98–107)
CO2 SERPL-SCNC: 26 MMOL/L — SIGNIFICANT CHANGE UP (ref 22–31)
CREAT SERPL-MCNC: 0.64 MG/DL — SIGNIFICANT CHANGE UP (ref 0.5–1.3)
GLUCOSE BLDC GLUCOMTR-MCNC: 124 MG/DL — HIGH (ref 70–99)
GLUCOSE BLDC GLUCOMTR-MCNC: 158 MG/DL — HIGH (ref 70–99)
GLUCOSE BLDC GLUCOMTR-MCNC: 163 MG/DL — HIGH (ref 70–99)
GLUCOSE BLDC GLUCOMTR-MCNC: 203 MG/DL — HIGH (ref 70–99)
GLUCOSE SERPL-MCNC: 142 MG/DL — HIGH (ref 70–99)
HCT VFR BLD CALC: 39.9 % — SIGNIFICANT CHANGE UP (ref 34.5–45)
HGB BLD-MCNC: 12.4 G/DL — SIGNIFICANT CHANGE UP (ref 11.5–15.5)
MAGNESIUM SERPL-MCNC: 1.8 MG/DL — SIGNIFICANT CHANGE UP (ref 1.6–2.6)
MCHC RBC-ENTMCNC: 30.9 PG — SIGNIFICANT CHANGE UP (ref 27–34)
MCHC RBC-ENTMCNC: 31.1 % — LOW (ref 32–36)
MCV RBC AUTO: 99.5 FL — SIGNIFICANT CHANGE UP (ref 80–100)
NRBC # FLD: 0 K/UL — SIGNIFICANT CHANGE UP (ref 0–0)
PLATELET # BLD AUTO: 177 K/UL — SIGNIFICANT CHANGE UP (ref 150–400)
PMV BLD: 11.1 FL — SIGNIFICANT CHANGE UP (ref 7–13)
POTASSIUM SERPL-MCNC: 4.5 MMOL/L — SIGNIFICANT CHANGE UP (ref 3.5–5.3)
POTASSIUM SERPL-SCNC: 4.5 MMOL/L — SIGNIFICANT CHANGE UP (ref 3.5–5.3)
RBC # BLD: 4.01 M/UL — SIGNIFICANT CHANGE UP (ref 3.8–5.2)
RBC # FLD: 14.3 % — SIGNIFICANT CHANGE UP (ref 10.3–14.5)
SODIUM SERPL-SCNC: 143 MMOL/L — SIGNIFICANT CHANGE UP (ref 135–145)
WBC # BLD: 5.22 K/UL — SIGNIFICANT CHANGE UP (ref 3.8–10.5)
WBC # FLD AUTO: 5.22 K/UL — SIGNIFICANT CHANGE UP (ref 3.8–10.5)

## 2020-01-27 PROCEDURE — 99222 1ST HOSP IP/OBS MODERATE 55: CPT

## 2020-01-27 PROCEDURE — 99233 SBSQ HOSP IP/OBS HIGH 50: CPT

## 2020-01-27 RX ADMIN — GABAPENTIN 300 MILLIGRAM(S): 400 CAPSULE ORAL at 05:21

## 2020-01-27 RX ADMIN — HEPARIN SODIUM 5000 UNIT(S): 5000 INJECTION INTRAVENOUS; SUBCUTANEOUS at 23:41

## 2020-01-27 RX ADMIN — Medication 1 APPLICATION(S): at 05:22

## 2020-01-27 RX ADMIN — Medication 1 APPLICATION(S): at 12:51

## 2020-01-27 RX ADMIN — Medication 2: at 12:49

## 2020-01-27 RX ADMIN — Medication 2: at 17:22

## 2020-01-27 RX ADMIN — Medication 81 MILLIGRAM(S): at 09:03

## 2020-01-27 RX ADMIN — CLOPIDOGREL BISULFATE 75 MILLIGRAM(S): 75 TABLET, FILM COATED ORAL at 09:03

## 2020-01-27 RX ADMIN — HEPARIN SODIUM 5000 UNIT(S): 5000 INJECTION INTRAVENOUS; SUBCUTANEOUS at 05:21

## 2020-01-27 RX ADMIN — Medication 1 DROP(S): at 17:22

## 2020-01-27 RX ADMIN — Medication 1 DROP(S): at 05:21

## 2020-01-27 RX ADMIN — Medication 1 APPLICATION(S): at 23:41

## 2020-01-27 RX ADMIN — GABAPENTIN 300 MILLIGRAM(S): 400 CAPSULE ORAL at 17:22

## 2020-01-27 RX ADMIN — MEMANTINE HYDROCHLORIDE 10 MILLIGRAM(S): 10 TABLET ORAL at 09:03

## 2020-01-27 RX ADMIN — Medication 25 MILLIGRAM(S): at 05:20

## 2020-01-27 RX ADMIN — ATORVASTATIN CALCIUM 10 MILLIGRAM(S): 80 TABLET, FILM COATED ORAL at 23:41

## 2020-01-27 RX ADMIN — LISINOPRIL 10 MILLIGRAM(S): 2.5 TABLET ORAL at 05:21

## 2020-01-27 RX ADMIN — Medication 25 MILLIGRAM(S): at 17:22

## 2020-01-27 RX ADMIN — HEPARIN SODIUM 5000 UNIT(S): 5000 INJECTION INTRAVENOUS; SUBCUTANEOUS at 12:50

## 2020-01-27 NOTE — PROGRESS NOTE ADULT - PROBLEM SELECTOR PLAN 1
- sudden onset of dizziness and headache while using the commode  - CT scan demonstrative of "A short segment dissection involves the distal right common carotid artery with extension to the proximal internal and external carotid arteries. There is no associated significant vascular narrowing."  - already seen by Neuro-surg and Neurology teams (appreciated), recommendation for aspirin, plavix  - MRI/A reattempted; dissection appears stable; comment about poss fungal sinusitis  Resume Plavix 3 weeks and ASA, then ASA only indefinitely

## 2020-01-27 NOTE — CHART NOTE - NSCHARTNOTEFT_GEN_A_CORE
Reviewed imaging.  Asx dissection noted.  Recommend aspirin.  Defer to cardiology for plavix prescription, not needed from neurology perspective.      EXAM: MR ANGIO BRAIN   EXAM: MR ANGIO NECK   EXAM: MR BRAIN   PROCEDURE DATE: Jan 26 2020   IMPRESSION:     Unchanged volume loss microvascular disease, no restricted diffusion,   hemorrhage or midline shift. Patent proximal anterior, middle, and posterior   cervical arteries, with stenosis of distal branch vessels.     Obstructed right ostiomeatal complex and right sphenoethmoidal recess with   right sphenoid mucocele right maxillary sinus complete opacification with   inspissated secretions and fluid level, infection with fungal sinusitis not   excluded.     Redemonstration variable caliber right common carotid artery and linear   dissection flap extending from the right common carotid artery into the   right internal carotid artery by 3.5 cm without hemodynamically stenosis at   right ICA origin. Reviewed imaging.  Probable incidental  RCCA dissection noted.  Recommend aspirin. From neurovascular standpoint, no role for dual antiplatelet therapy.   Defer to cardiology as to whether there is a firm indication for dual antiplatelet therapy. from the cardiovascular standpoint.      EXAM: MR ANGIO BRAIN   EXAM: MR ANGIO NECK   EXAM: MR BRAIN   PROCEDURE DATE: Jan 26 2020   IMPRESSION:     Unchanged volume loss microvascular disease, no restricted diffusion,   hemorrhage or midline shift. Patent proximal anterior, middle, and posterior   cervical arteries, with stenosis of distal branch vessels.     Obstructed right ostiomeatal complex and right sphenoethmoidal recess with   right sphenoid mucocele right maxillary sinus complete opacification with   inspissated secretions and fluid level, infection with fungal sinusitis not   excluded.     Redemonstration variable caliber right common carotid artery and linear   dissection flap extending from the right common carotid artery into the   right internal carotid artery by 3.5 cm without hemodynamically stenosis at   right ICA origin.

## 2020-01-27 NOTE — DIETITIAN INITIAL EVALUATION ADULT. - PERTINENT LABORATORY DATA
01-27 Na 143 mmol/L Glu 142 mg/dL<H> K+ 4.5 mmol/L Cr 0.64 mg/dL BUN 18 mg/dL Phos n/a    01-21-20 HbA1c 7.6 %  01-27 @ 17:21 POCT 163 mg/dL  01-27 @ 12:45 POCT 158 mg/dL  01-27 @ 08:53 POCT 124 mg/dL  01-26 @ 22:47 POCT 178 mg/dL

## 2020-01-27 NOTE — DIETITIAN INITIAL EVALUATION ADULT. - ADD RECOMMEND
1. D/c GERD/ DASH restrictions in setting of advanced age. 2. Encourage PO intake and honor food preferences as able.

## 2020-01-27 NOTE — DIETITIAN INITIAL EVALUATION ADULT. - PERTINENT MEDS FT
MEDICATIONS  (STANDING):  aspirin enteric coated 81 milliGRAM(s) Oral daily  atorvastatin 10 milliGRAM(s) Oral at bedtime  clopidogrel Tablet 75 milliGRAM(s) Oral daily  dextrose 5%. 1000 milliLiter(s) (50 mL/Hr) IV Continuous <Continuous>  dextrose 50% Injectable 12.5 Gram(s) IV Push once  dextrose 50% Injectable 25 Gram(s) IV Push once  dextrose 50% Injectable 25 Gram(s) IV Push once  gabapentin 300 milliGRAM(s) Oral two times a day  heparin  Injectable 5000 Unit(s) SubCutaneous every 8 hours  insulin lispro (HumaLOG) corrective regimen sliding scale   SubCutaneous three times a day before meals  insulin lispro (HumaLOG) corrective regimen sliding scale   SubCutaneous at bedtime  lisinopril 10 milliGRAM(s) Oral daily  memantine 10 milliGRAM(s) Oral daily  metoprolol tartrate 25 milliGRAM(s) Oral two times a day  petrolatum white Ointment 1 Application(s) Topical three times a day  timolol 0.5% Solution 1 Drop(s) Both EYES two times a day

## 2020-01-27 NOTE — DIETITIAN INITIAL EVALUATION ADULT. - OTHER INFO
89 y/o F with carotid artery dissection with plan to d/c to [facility]. PMH HTN, CAD, DM2, HLD. Per pt, she is eating well (at baseline). No GI distress (nausea/vomiting/diarrhea/constipation.) No chewing or swallowing difficulties at this time. NKFA. Stated UBW ~107 pounds. Dosing wt Oct. 2019 admit 45.3 kg. Pt has remained wt stable. Education not appropriate at this time.

## 2020-01-27 NOTE — PROGRESS NOTE ADULT - SUBJECTIVE AND OBJECTIVE BOX
[facility] Division of Hospital Medicine  Rani Carney MD  Pager [phone]    Patient is a 89y old  Female who presents with a chief complaint of Carotid artery dissection      SUBJECTIVE / OVERNIGHT EVENTS: without complaints; asking about MRI result; spoke with daughter Maria De Jesus about MRI result and ID eval;       MEDICATIONS  (STANDING):  aspirin enteric coated 81 milliGRAM(s) Oral daily  atorvastatin 10 milliGRAM(s) Oral at bedtime  clopidogrel Tablet 75 milliGRAM(s) Oral daily  dextrose 5%. 1000 milliLiter(s) (50 mL/Hr) IV Continuous <Continuous>  dextrose 50% Injectable 12.5 Gram(s) IV Push once  dextrose 50% Injectable 25 Gram(s) IV Push once  dextrose 50% Injectable 25 Gram(s) IV Push once  gabapentin 300 milliGRAM(s) Oral two times a day  heparin  Injectable 5000 Unit(s) SubCutaneous every 8 hours  insulin lispro (HumaLOG) corrective regimen sliding scale   SubCutaneous three times a day before meals  insulin lispro (HumaLOG) corrective regimen sliding scale   SubCutaneous at bedtime  lisinopril 10 milliGRAM(s) Oral daily  memantine 10 milliGRAM(s) Oral daily  metoprolol tartrate 25 milliGRAM(s) Oral two times a day  petrolatum white Ointment 1 Application(s) Topical three times a day  timolol 0.5% Solution 1 Drop(s) Both EYES two times a day    MEDICATIONS  (PRN):  dextrose 40% Gel 15 Gram(s) Oral once PRN Blood Glucose LESS THAN 70 milliGRAM(s)/deciliter  glucagon  Injectable 1 milliGRAM(s) IntraMuscular once PRN Glucose LESS THAN 70 milligrams/deciliter      CAPILLARY BLOOD GLUCOSE  POCT Blood Glucose.: 158 mg/dL (27 Jan 2020 12:45)  POCT Blood Glucose.: 124 mg/dL (27 Jan 2020 08:53)  POCT Blood Glucose.: 178 mg/dL (26 Jan 2020 22:47)  POCT Blood Glucose.: 205 mg/dL (26 Jan 2020 17:25)            PHYSICAL EXAM:  Vital Signs Last 24 Hrs  T(F): 97.4 (27 Jan 2020 05:17), Max: 97.8 (26 Jan 2020 20:23)  HR: 74 (27 Jan 2020 05:17) (60 - 74)  BP: 119/70 (27 Jan 2020 05:17) (108/64 - 119/70)  RR: 18 (27 Jan 2020 05:17) (18 - 18)  SpO2: 96% (27 Jan 2020 05:17) (96% - 98%)    CONSTITUTIONAL: NAD  ENMT: Moist oral mucosa  RESPIRATORY: Normal respiratory effort; lungs are clear to auscultation bilaterally  CARDIOVASCULAR: Regular rate and rhythm; No lower extremity edema;   ABDOMEN: Nontender to palpation, normoactive bowel sounds  MUSCULOSKELETAL: no clubbing or cyanosis of digits; no joint swelling or tenderness to palpation  PSYCH: affect appropriate; seems like MS back to baseline (confirmed with daughter)  NEUROLOGY: no gross sensory deficits       LABS:                        12.4   5.22  )-----------( 177      ( 27 Jan 2020 04:30 )             39.9     01-27    143  |  105  |  18  ----------------------------<  142<H>  4.5   |  26  |  0.64    Ca    9.1      27 Jan 2020 04:30  Mg     1.8     01-27

## 2020-01-27 NOTE — CONSULT NOTE ADULT - ASSESSMENT
89 yo woman with HTN, CAD, DM admitted 1/20 with dizziness, suspected stroke who was found to have right carotid artery dissection on imaging as well as right sphenoid mucocele and opacification of maxillary sinus.   No fever, sinus tenderness or headache to suggest infection.  No h/o sinus infections, steroid treatment in past.   I reviewed MRI with neuroradiologist- changes look chronic and no evidence of bony erosion.   Doubt acute sinus infection.  No need for antibiotic, antifungal at this time.    Ana Apodaca MD  Pager: [phone]  After 5 PM or weekends please call fellow on call or office [phone]

## 2020-01-27 NOTE — DIETITIAN INITIAL EVALUATION ADULT. - PROBLEM SELECTOR PLAN 1
- sudden onset of dizziness and headache while using the commode  - CT scan demonstrative of "A short segment dissection involves the distal right common carotid artery with extension to the proximal internal and external carotid arteries. There is no associated significant vascular narrowing."  - already seen by Neuro-surg and Neurology teams (appreciated); recommendation for aspirin, plavix, MRI, MRAs.  - "Decision to continue DAPT to be made by stroke team tomorrow," per Neurology team  - no need for AC presently, per Neuro (see note)  - call Neuro STAT if any neurological changes (per Neuro)  - Neuro-checks, fall pre precautions  - continuing statin  - f/u AM lab-work

## 2020-01-27 NOTE — PROGRESS NOTE ADULT - ASSESSMENT
89 year old female, with past history significant for HTN, CAD s/p Stent, Type-II DM, and HLD, presented to the ED secondary to dizziness, pt found to have R carotid artery dissection. no intervention per neurosurgery. medical management per neuro and neurosurgery. plan to discharge to [facility].

## 2020-01-27 NOTE — CONSULT NOTE ADULT - SUBJECTIVE AND OBJECTIVE BOX
HPI:    hx from patient and daughter Humera at bedside  89 year old female with HTN, CAD s/p Stent, DM,  presented 1/20 to the ED secondary to dizziness. Mrs. Bishop lives in her home with her  and aide who called ambulance.    In ER was confused. Imaging showed right carotid artery dissection.   She was given a 5 day course of ceftriaxone for suspected UTI; completed 1/24.   Today feels well and mental status at baseline; appetite good. Daughter reports patient eating more here than home.   Did not have fever, chills, dysuria PTA.   No h/o sinus infections, surgery.  Had punctured ear drum on left as child.   Planning rehab soon.          PAST MEDICAL & SURGICAL HISTORY:  Glaucoma  Dementia  CAD (coronary artery disease)  DM (diabetes mellitus)  HLD (hyperlipidemia)  HTN (hypertension)  S/P hip replacement, bilateral  S/P coronary artery stent placement      Allergies    No Known Allergies    Intolerances        ANTIMICROBIALS:      OTHER MEDS:  aspirin enteric coated 81 milliGRAM(s) Oral daily  atorvastatin 10 milliGRAM(s) Oral at bedtime  clopidogrel Tablet 75 milliGRAM(s) Oral daily  dextrose 40% Gel 15 Gram(s) Oral once PRN  dextrose 5%. 1000 milliLiter(s) IV Continuous <Continuous>  dextrose 50% Injectable 12.5 Gram(s) IV Push once  dextrose 50% Injectable 25 Gram(s) IV Push once  dextrose 50% Injectable 25 Gram(s) IV Push once  gabapentin 300 milliGRAM(s) Oral two times a day  glucagon  Injectable 1 milliGRAM(s) IntraMuscular once PRN  heparin  Injectable 5000 Unit(s) SubCutaneous every 8 hours  insulin lispro (HumaLOG) corrective regimen sliding scale   SubCutaneous three times a day before meals  insulin lispro (HumaLOG) corrective regimen sliding scale   SubCutaneous at bedtime  lisinopril 10 milliGRAM(s) Oral daily  memantine 10 milliGRAM(s) Oral daily  metoprolol tartrate 25 milliGRAM(s) Oral two times a day  petrolatum white Ointment 1 Application(s) Topical three times a day  timolol 0.5% Solution 1 Drop(s) Both EYES two times a day      SOCIAL HISTORY: lives home with  and aide.    FAMILY HISTORY:  Family history of hypertension: ~ sister and brother      REVIEW OF SYSTEMS  [  ] ROS unobtainable because:    [ x ] All other systems negative except as noted below:	    Constitutional:  [ ] fever [ ] chills  [ ] weight loss  [x ] weakness  Skin:  [ ] rash [ ] phlebitis	  Eyes: [ ] icterus [ ] pain  [ ] discharge	  ENMT: [ ] sore throat  [ ] thrush [ ] ulcers [ ] exudates  Respiratory: [ ] dyspnea [ ] hemoptysis [ ] cough [ ] sputum	  Cardiovascular:  [ ] chest pain [ ] palpitations [ ] edema	  Gastrointestinal:  [ ] nausea [ ] vomiting [ ] diarrhea [ ] constipation [ ] pain	  Genitourinary:  [ ] dysuria [ ] frequency [ ] hematuria [ ] discharge [ ] flank pain  [ ] incontinence  Musculoskeletal:  [ ] myalgias [ ] arthralgias [ ] arthritis  [ ] back pain  Neurological:  [ ] headache [ ] seizures  [ ] confusion/altered mental status  Psychiatric:  [ ] anxiety [ ] depression	  Hematology/Lymphatics:  [ ] lymphadenopathy  Endocrine:  [ ] adrenal [ ] thyroid  Allergic/Immunologic:	 [ ] transplant [ ] seasonal    PHYSICAL EXAM:  General: [x ] non-toxic, alert, conversant, no distress  HEAD/EYES: EOMI, no periorbital swelling, no facial asymmetry   ENT:  supple   Cardiovascular: murmur   Respiratory:   clear to ausculation bilaterally  GI:   soft, non-tender, normal bowel sounds  : no kennedy   Musculoskeletal:  no synovitis  Neurologic:   non-focal exam   Skin:  no rash  Lymph: no cervical lymphadenopathy  Psychiatric:  [x ] appropriate affect [x ] alert & oriented  Lines:  [x ] no phlebitis right arm iv          Drug Dosing Weight  Height (cm): 152.4 (21 Jan 2020 23:40)  Weight (kg): 46.3 (21 Jan 2020 23:40)  BMI (kg/m2): 19.9 (21 Jan 2020 23:40)  BSA (m2): 1.4 (21 Jan 2020 23:40)    Vital Signs Last 24 Hrs  T(F): 97.4 (01-27-20 @ 05:17), Max: 98.6 (01-21-20 @ 14:20)    Vital Signs Last 24 Hrs  HR: 74 (01-27-20 @ 05:17) (60 - 74)  BP: 119/70 (01-27-20 @ 05:17) (108/64 - 119/70)  RR: 18 (01-27-20 @ 05:17)  SpO2: 96% (01-27-20 @ 05:17) (96% - 98%)  Wt(kg): --                          12.4   5.22  )-----------( 177      ( 27 Jan 2020 04:30 )             39.9       01-27    143  |  105  |  18  ----------------------------<  142<H>  4.5   |  26  |  0.64    Ca    9.1      27 Jan 2020 04:30  Mg     1.8     01-27            MICROBIOLOGY:  URINE MIDSTREAM  01-21-20 --  --  --    Culture - Urine (01.21.20 @ 17:38)    Culture - Urine:   NO GROWTH AT 24 HOURS    Specimen Source: URINE MIDSTREAM        RADIOLOGY:  < from: MR Angio Neck No Cont (01.26.20 @ 20:05) >  EXAM:  MR ANGIO BRAIN      EXAM:  MR ANGIO NECK      EXAM:  MR BRAIN        PROCEDURE DATE:  Jan 26 2020         INTERPRETATION:  CLINICAL INDICATION: Carotid artery dissection, dizziness, cardiac stent, rule out stroke    Technique: Noncontrast brain MRI, non-contrast brain MRA and noncontrast neck MRA was performed.      Through the brain, sagittal and axial T1, axial T2, FLAIR, diffusion weighted images and an ADC map were obtained.      MR angiography of intracranial and extracranial circulation was performed with time of flight imaging technique. Maximal intensity projection images were reviewed in multiple planes.    COMPARISON: Head CT, CTA brain and neck, 1/26/2020, head CT 10/16/2018, 2/4/2018    FINDINGS:  Brain MRI:    Redemonstration of enlarged ventricles and sulci greater expected for age consistent with marked volume loss with bifrontal diameter of 4.5 cm, unchanged from prior. We didn't there are nonspecific T2/FLAIR signal hyperintensity within the hemispheric white matter,  likely related to sequelae of microvascular disease. There is no new intraparenchymal hematoma, mass effect or midline shift. No new areas of abnormal restrictive diffusion are present to suggest acute or subacute infarction. No abnormal extra-axial fluid collections are present. Partially empty sella. Basal cisterns are patent.    Unremarkable orbital globes. Right ostiomeatal complex obstruction, right maxillary sinus near complete opacification with mucosal thickening fluid level, and central decreased T2 signal, may reflect inspissation, fungal infection, and/or sinusitis not excluded. Obstructed right sphenoethmoidal recess, completely opacified right sphenoid sinus consistent with right sphenoid sinus mucocele with bony hyperostosis likely chronic inflammatory change. Mucosal thickening in the ethmoidal air cells. Edentulous maxilla with unerupted tooth at left anterior maxillary alveolus.  Mastoid air cell opacification, calvarium is intact.    Brain MRA:    Patent flow signal, in the bilateral ICA cavernous and supraclinoid segments segments with multifocal stenosis from atherosclerotic calcification better seen on corresponding CT. There is a 1.5 mm outpouching that tapers, likely prominent infundibula at the origin right posterior communicating artery extending medially from the right cavernous ICA,. Proximal A1 and A2 and M1 and M2 anterior middle cerebral arteries  and anterior communicating artery are patent without flow-limiting stenosis, with stenoses of distalanterior, middle cerebral artery branch vessels.    Intradural vertebral arteries, basilar artery, basilar tip, and proximal posterior cerebral arteries are patent without flow-limiting stenosis, with stenoses of the mid and distal bilateral branch PCA vessels.      Neck MRA:    Three-vessel aortic arch, with calcification, no hemodynamically significant stenosis of the great vessel origins.    Common and Internal Carotids:    Redemonstration of irregularity of the proximal right common carotid artery, flow void luminal signal of the proximal right common carotid artery measures 3.4 mm transverse,, approximately 3.5 cm from vessel origin, there the right common carotid artery measures 5.9 mm transverse, with a linear flap decreased T2 signal,consistent with dissection flap as noted prior CTA. Dissection flap extends along the enlarged distal right common carotid artery, to the right carotid bulb and proximal right internal carotid artery for approximately 3 cm craniocaudal, without obvious flow-limiting stenosis. Redemonstration atherosclerotic plaque narrowing origin of the right common carotid artery without hemodynamically significant stenosis by NASCET criteria.  The right internal artery carotid artery is tortuous, likely reflect hypertension.    Patent left common carotid artery, with flow related signal, extending into the left carotid bulb, and atherosclerotic plaque with approximately 20% stenosis at the left ICA origin, without hemodynamically significant stenosis by NASCET criteria. Tortuous left internal carotid artery likely from hypertension without focal stenosis.    Vertebral arteries:   Patent bilateral vertebral arteries, dominant right and slightly smaller left vertebral artery, no flow-limiting stenosis.    IMPRESSION:    Unchanged volume loss microvascular disease, no restricted diffusion, hemorrhage or midline shift. Patent proximal anterior, middle, and posterior cervical arteries, with stenosis of distal branch vessels.    Obstructed right ostiomeatal complex and right sphenoethmoidal recess with right sphenoid mucocele right maxillary sinus complete opacification with inspissated secretions and fluid level, infection with fungal sinusitis not excluded.    Redemonstration variable caliber rightcommon carotid artery and linear dissection flap extending from the right common carotid artery into the right internal carotid artery by 3.5 cm without hemodynamically stenosis at right ICA origin.    < end of copied text >

## 2020-01-28 DIAGNOSIS — J32.9 CHRONIC SINUSITIS, UNSPECIFIED: ICD-10-CM

## 2020-01-28 LAB
ANION GAP SERPL CALC-SCNC: 12 MMO/L — SIGNIFICANT CHANGE UP (ref 7–14)
BUN SERPL-MCNC: 20 MG/DL — SIGNIFICANT CHANGE UP (ref 7–23)
CALCIUM SERPL-MCNC: 9.4 MG/DL — SIGNIFICANT CHANGE UP (ref 8.4–10.5)
CHLORIDE SERPL-SCNC: 101 MMOL/L — SIGNIFICANT CHANGE UP (ref 98–107)
CO2 SERPL-SCNC: 27 MMOL/L — SIGNIFICANT CHANGE UP (ref 22–31)
CREAT SERPL-MCNC: 0.78 MG/DL — SIGNIFICANT CHANGE UP (ref 0.5–1.3)
GLUCOSE BLDC GLUCOMTR-MCNC: 150 MG/DL — HIGH (ref 70–99)
GLUCOSE BLDC GLUCOMTR-MCNC: 205 MG/DL — HIGH (ref 70–99)
GLUCOSE BLDC GLUCOMTR-MCNC: 220 MG/DL — HIGH (ref 70–99)
GLUCOSE BLDC GLUCOMTR-MCNC: 222 MG/DL — HIGH (ref 70–99)
GLUCOSE SERPL-MCNC: 147 MG/DL — HIGH (ref 70–99)
HCT VFR BLD CALC: 38.5 % — SIGNIFICANT CHANGE UP (ref 34.5–45)
HCT VFR BLD CALC: 43.7 % — SIGNIFICANT CHANGE UP (ref 34.5–45)
HGB BLD-MCNC: 12.5 G/DL — SIGNIFICANT CHANGE UP (ref 11.5–15.5)
HGB BLD-MCNC: 13.5 G/DL — SIGNIFICANT CHANGE UP (ref 11.5–15.5)
MAGNESIUM SERPL-MCNC: 1.7 MG/DL — SIGNIFICANT CHANGE UP (ref 1.6–2.6)
MCHC RBC-ENTMCNC: 30.4 PG — SIGNIFICANT CHANGE UP (ref 27–34)
MCHC RBC-ENTMCNC: 30.5 PG — SIGNIFICANT CHANGE UP (ref 27–34)
MCHC RBC-ENTMCNC: 30.9 % — LOW (ref 32–36)
MCHC RBC-ENTMCNC: 32.5 % — SIGNIFICANT CHANGE UP (ref 32–36)
MCV RBC AUTO: 93.7 FL — SIGNIFICANT CHANGE UP (ref 80–100)
MCV RBC AUTO: 98.6 FL — SIGNIFICANT CHANGE UP (ref 80–100)
NRBC # FLD: 0 K/UL — SIGNIFICANT CHANGE UP (ref 0–0)
NRBC # FLD: 0 K/UL — SIGNIFICANT CHANGE UP (ref 0–0)
PLATELET # BLD AUTO: 220 K/UL — SIGNIFICANT CHANGE UP (ref 150–400)
PLATELET # BLD AUTO: 241 K/UL — SIGNIFICANT CHANGE UP (ref 150–400)
PMV BLD: 10.3 FL — SIGNIFICANT CHANGE UP (ref 7–13)
PMV BLD: 11 FL — SIGNIFICANT CHANGE UP (ref 7–13)
POTASSIUM SERPL-MCNC: 4.4 MMOL/L — SIGNIFICANT CHANGE UP (ref 3.5–5.3)
POTASSIUM SERPL-SCNC: 4.4 MMOL/L — SIGNIFICANT CHANGE UP (ref 3.5–5.3)
RBC # BLD: 4.11 M/UL — SIGNIFICANT CHANGE UP (ref 3.8–5.2)
RBC # BLD: 4.43 M/UL — SIGNIFICANT CHANGE UP (ref 3.8–5.2)
RBC # FLD: 14 % — SIGNIFICANT CHANGE UP (ref 10.3–14.5)
RBC # FLD: 14.2 % — SIGNIFICANT CHANGE UP (ref 10.3–14.5)
SODIUM SERPL-SCNC: 140 MMOL/L — SIGNIFICANT CHANGE UP (ref 135–145)
WBC # BLD: 12.04 K/UL — HIGH (ref 3.8–10.5)
WBC # BLD: 15.27 K/UL — HIGH (ref 3.8–10.5)
WBC # FLD AUTO: 12.04 K/UL — HIGH (ref 3.8–10.5)
WBC # FLD AUTO: 15.27 K/UL — HIGH (ref 3.8–10.5)

## 2020-01-28 PROCEDURE — 99233 SBSQ HOSP IP/OBS HIGH 50: CPT

## 2020-01-28 PROCEDURE — 99232 SBSQ HOSP IP/OBS MODERATE 35: CPT

## 2020-01-28 RX ORDER — MAGNESIUM OXIDE 400 MG ORAL TABLET 241.3 MG
400 TABLET ORAL
Refills: 0 | Status: COMPLETED | OUTPATIENT
Start: 2020-01-28 | End: 2020-01-28

## 2020-01-28 RX ADMIN — MEMANTINE HYDROCHLORIDE 10 MILLIGRAM(S): 10 TABLET ORAL at 10:19

## 2020-01-28 RX ADMIN — GABAPENTIN 300 MILLIGRAM(S): 400 CAPSULE ORAL at 07:35

## 2020-01-28 RX ADMIN — MAGNESIUM OXIDE 400 MG ORAL TABLET 400 MILLIGRAM(S): 241.3 TABLET ORAL at 10:18

## 2020-01-28 RX ADMIN — HEPARIN SODIUM 5000 UNIT(S): 5000 INJECTION INTRAVENOUS; SUBCUTANEOUS at 13:00

## 2020-01-28 RX ADMIN — GABAPENTIN 300 MILLIGRAM(S): 400 CAPSULE ORAL at 17:34

## 2020-01-28 RX ADMIN — HEPARIN SODIUM 5000 UNIT(S): 5000 INJECTION INTRAVENOUS; SUBCUTANEOUS at 07:34

## 2020-01-28 RX ADMIN — LISINOPRIL 10 MILLIGRAM(S): 2.5 TABLET ORAL at 07:36

## 2020-01-28 RX ADMIN — HEPARIN SODIUM 5000 UNIT(S): 5000 INJECTION INTRAVENOUS; SUBCUTANEOUS at 23:04

## 2020-01-28 RX ADMIN — Medication 1 APPLICATION(S): at 07:37

## 2020-01-28 RX ADMIN — MAGNESIUM OXIDE 400 MG ORAL TABLET 400 MILLIGRAM(S): 241.3 TABLET ORAL at 17:34

## 2020-01-28 RX ADMIN — Medication 4: at 12:59

## 2020-01-28 RX ADMIN — Medication 1 DROP(S): at 07:36

## 2020-01-28 RX ADMIN — Medication 81 MILLIGRAM(S): at 10:19

## 2020-01-28 RX ADMIN — Medication 25 MILLIGRAM(S): at 07:36

## 2020-01-28 RX ADMIN — Medication 4: at 17:37

## 2020-01-28 RX ADMIN — Medication 1 APPLICATION(S): at 13:01

## 2020-01-28 RX ADMIN — Medication 1 DROP(S): at 17:35

## 2020-01-28 RX ADMIN — CLOPIDOGREL BISULFATE 75 MILLIGRAM(S): 75 TABLET, FILM COATED ORAL at 10:19

## 2020-01-28 RX ADMIN — Medication 25 MILLIGRAM(S): at 17:34

## 2020-01-28 RX ADMIN — Medication 1 APPLICATION(S): at 23:07

## 2020-01-28 RX ADMIN — ATORVASTATIN CALCIUM 10 MILLIGRAM(S): 80 TABLET, FILM COATED ORAL at 23:04

## 2020-01-28 RX ADMIN — MAGNESIUM OXIDE 400 MG ORAL TABLET 400 MILLIGRAM(S): 241.3 TABLET ORAL at 12:59

## 2020-01-28 NOTE — CONSULT NOTE ADULT - SUBJECTIVE AND OBJECTIVE BOX
Jimbo Trevino MD  Interventional Cardiology / Endovascular Specialist  [place] Office : [phone] [place] [address] [place] [address]  Tel:   [place] Office : [address] [facility] [place] [address]  Tel: [phone]  Cell : [phone]      HISTORY OF PRESENTING ILLNESS:    89 RHF w/ PMH of HTN, HLD, DM, Sever AS, CAD s/p stent placement presented w/ dizziness on the morning of 1/20 found to have carotid artery dissection. Echo dose shows Hyperdynamic LV function with paradoxical low flow low gradient AS. Today had short run of WCT , states he continues to be dizzy constantly but much improved no exacerbation of symptoms during tachycardia. Denies CP, SOB     PAST MEDICAL & SURGICAL HISTORY:  Glaucoma  Dementia  CAD (coronary artery disease)  DM (diabetes mellitus)  HLD (hyperlipidemia)  HTN (hypertension)  S/P hip replacement, bilateral  S/P coronary artery stent placement      SOCIAL HISTORY: Substance Use (street drugs): ( x ) never used  (  ) other:    FAMILY HISTORY:  Family history of hypertension: ~ sister and brother      REVIEW OF SYSTEMS:  CONSTITUTIONAL: No fever, weight loss, or fatigue  EYES: No eye pain, visual disturbances, or discharge  ENMT:  No difficulty hearing, tinnitus, vertigo; No sinus or throat pain  BREASTS: No pain, masses, or nipple discharge  GASTROINTESTINAL: No abdominal or epigastric pain. No nausea, vomiting, or hematemesis; No diarrhea or constipation. No melena or hematochezia.  GENITOURINARY: No dysuria, frequency, hematuria, or incontinence  NEUROLOGICAL: No headaches, memory loss, loss of strength, numbness, or tremors  ENDOCRINE: No heat or cold intolerance; No hair loss  MUSCULOSKELETAL: No joint pain or swelling; No muscle, back, or extremity pain  PSYCHIATRIC: No depression, anxiety, mood swings, or difficulty sleeping  HEME/LYMPH: No easy bruising, or bleeding gums  All others negative    MEDICATIONS:  aspirin enteric coated 81 milliGRAM(s) Oral daily  clopidogrel Tablet 75 milliGRAM(s) Oral daily  heparin  Injectable 5000 Unit(s) SubCutaneous every 8 hours  lisinopril 10 milliGRAM(s) Oral daily  metoprolol tartrate 25 milliGRAM(s) Oral two times a day        gabapentin 300 milliGRAM(s) Oral two times a day  memantine 10 milliGRAM(s) Oral daily      atorvastatin 10 milliGRAM(s) Oral at bedtime  dextrose 40% Gel 15 Gram(s) Oral once PRN  dextrose 50% Injectable 12.5 Gram(s) IV Push once  dextrose 50% Injectable 25 Gram(s) IV Push once  dextrose 50% Injectable 25 Gram(s) IV Push once  glucagon  Injectable 1 milliGRAM(s) IntraMuscular once PRN  insulin lispro (HumaLOG) corrective regimen sliding scale   SubCutaneous three times a day before meals  insulin lispro (HumaLOG) corrective regimen sliding scale   SubCutaneous at bedtime    dextrose 5%. 1000 milliLiter(s) IV Continuous <Continuous>  magnesium oxide 400 milliGRAM(s) Oral three times a day with meals  petrolatum white Ointment 1 Application(s) Topical three times a day  timolol 0.5% Solution 1 Drop(s) Both EYES two times a day      FAMILY HISTORY:  Family history of hypertension: ~ sister and brother        Allergies    No Known Allergies    Intolerances    	      PHYSICAL EXAM:  T(C): 36.6 (01-28-20 @ 13:17), Max: 36.6 (01-28-20 @ 13:17)  HR: 67 (01-28-20 @ 13:17) (61 - 67)  BP: 126/81 (01-28-20 @ 13:17) (117/63 - 149/67)  RR: 15 (01-28-20 @ 13:17) (15 - 18)  SpO2: 96% (01-28-20 @ 13:17) (96% - 99%)  Wt(kg): --  I&O's Summary    28 Jan 2020 07:01  -  28 Jan 2020 16:11  --------------------------------------------------------  IN: 240 mL / OUT: 0 mL / NET: 240 mL      Appearance: Normal	  HEENT:   Normal oral mucosa, PERRL, EOMI	  Cardiovascular: Normal S1 S2, No JVD, 4+ Ejection systolic murmur, No edema  Respiratory: Lungs clear to auscultation	  Gastrointestinal:  Soft, Non-tender, + BS	  Extremities: No clubbing, cyanosis or edema            LABS:	 	    CARDIAC MARKERS:                                  12.5   15.27 )-----------( 220      ( 28 Jan 2020 11:02 )             38.5     01-28    140  |  101  |  20  ----------------------------<  147<H>  4.4   |  27  |  0.78    Ca    9.4      28 Jan 2020 05:06  Mg     1.7     01-28      proBNP:   Lipid Profile:   HgA1c:   TSH:     Consultant(s) Notes Reviewed:  [x ] YES  [ ] NO    Care Discussed with Consultants/Other Providers [ x] YES  [ ] NO    Imaging Personally Reviewed independently:  [x] YES  [ ] NO    All labs, radiologic studies, vitals, orders and medications list reviewed. Patient is seen and examined at bedside. Case discussed with medical team.    ASSESSMENT/PLAN:

## 2020-01-28 NOTE — PROVIDER CONTACT NOTE (OTHER) - BACKGROUND
Patient is a 89 yo female, presented with carotid artery dissection;
Patient had 5 beats Vtach on day shift 1/23.
admitted carotid artery dissection
admitted for carotid artery dissection
admitted for carotid artery dissection
admitted for carotid artery dissection   PT received ativan @ 17:00
admitted for carotid artery dissection.  SR on tele.
carotid artery dissection
carotid artery dissection
history of NSVT- metoprolol up-titrated to 25 mg PO BID
pt admit for carotid artery dissection
pt here for carotid artery dissection

## 2020-01-28 NOTE — PROGRESS NOTE ADULT - SUBJECTIVE AND OBJECTIVE BOX
Follow Up:  MRI findings.    Inverval History/ROS:  WBC elevated 15 k today.  c/o  lightheadedness.  denies pain, fever, chills.  loose stool x 1.  peripheral iv changed yesterday.    Allergies  No Known Allergies        ANTIMICROBIALS:  ceftriaxone 1/21- 1/24    OTHER MEDS:  aspirin enteric coated 81 milliGRAM(s) Oral daily  atorvastatin 10 milliGRAM(s) Oral at bedtime  clopidogrel Tablet 75 milliGRAM(s) Oral daily  dextrose 40% Gel 15 Gram(s) Oral once PRN  dextrose 5%. 1000 milliLiter(s) IV Continuous <Continuous>  dextrose 50% Injectable 12.5 Gram(s) IV Push once  dextrose 50% Injectable 25 Gram(s) IV Push once  dextrose 50% Injectable 25 Gram(s) IV Push once  gabapentin 300 milliGRAM(s) Oral two times a day  glucagon  Injectable 1 milliGRAM(s) IntraMuscular once PRN  heparin  Injectable 5000 Unit(s) SubCutaneous every 8 hours  insulin lispro (HumaLOG) corrective regimen sliding scale   SubCutaneous three times a day before meals  insulin lispro (HumaLOG) corrective regimen sliding scale   SubCutaneous at bedtime  lisinopril 10 milliGRAM(s) Oral daily  magnesium oxide 400 milliGRAM(s) Oral three times a day with meals  memantine 10 milliGRAM(s) Oral daily  metoprolol tartrate 25 milliGRAM(s) Oral two times a day  petrolatum white Ointment 1 Application(s) Topical three times a day  timolol 0.5% Solution 1 Drop(s) Both EYES two times a day      Vital Signs Last 24 Hrs  T(C): 36.6 (28 Jan 2020 13:17), Max: 36.6 (28 Jan 2020 13:17)  T(F): 97.9 (28 Jan 2020 13:17), Max: 97.9 (28 Jan 2020 13:17)  HR: 67 (28 Jan 2020 13:17) (61 - 67)  BP: 126/81 (28 Jan 2020 13:17) (117/63 - 149/67)  BP(mean): --  RR: 15 (28 Jan 2020 13:17) (15 - 18)  SpO2: 96% (28 Jan 2020 13:17) (96% - 99%)    PHYSICAL EXAM:  General:  non-toxic, alert, comfortable on RA.  HEAD/EYES: white sclera   ENT:   supple, no tenderness over sinuses  Cardiovascular:   murmur   Respiratory:   clear to ausculation bilaterally  GI:   soft, non-tender, normal bowel sounds  : no kennedy   Musculoskeletal:   no synovitis  Neurologic:   non-focal exam   Skin:   no rash  Lymph: no cervical lymphadenopathy  Psychiatric:   appropriate affect, alert & oriented  Lines:   no phlebitis peripheral iv right arm                                12.5   15.27 )-----------( 220      ( 28 Jan 2020 11:02 )             38.5       01-28    140  |  101  |  20  ----------------------------<  147<H>  4.4   |  27  |  0.78    Ca    9.4      28 Jan 2020 05:06  Mg     1.7     01-28            MICROBIOLOGY:  Culture - Urine (01.21.20 @ 17:38)    Culture - Urine:   NO GROWTH AT 24 HOURS    Specimen Source: URINE MIDSTREAM      RADIOLOGY:    < from: MR Angio Neck No Cont (01.26.20 @ 20:05) >  IMPRESSION:    Unchanged volume loss microvascular disease, no restricted diffusion, hemorrhage or midline shift. Patent proximal anterior, middle, and posterior cervical arteries, with stenosis of distal branch vessels.    Obstructed right ostiomeatal complex and right sphenoethmoidal recess with right sphenoid mucocele right maxillary sinus complete opacification with inspissated secretions and fluid level, infection with fungal sinusitis not excluded.    Redemonstration variable caliber rightcommon carotid artery and linear dissection flap extending from the right common carotid artery into the right internal carotid artery by 3.5 cm without hemodynamically stenosis at right ICA origin.    < end of copied text >

## 2020-01-28 NOTE — PROGRESS NOTE ADULT - PROBLEM SELECTOR PLAN 1
- sudden onset of dizziness and headache while using the commode  - CT scan demonstrative of "A short segment dissection involves the distal right common carotid artery with extension to the proximal internal and external carotid arteries. There is no associated significant vascular narrowing."  - already seen by Neuro-surg and Neurology teams (appreciated), recommendation for aspirin, no need for DAPT per neuro for their standpoint

## 2020-01-28 NOTE — PROGRESS NOTE ADULT - ASSESSMENT
89 year old female, with past history significant for HTN, CAD s/p Stent, Type-II DM, and HLD, presented to the ED secondary to dizziness, pt found to have R carotid artery dissection. no intervention per neurosurgery. medical management per neuro and neurosurgery. plan to discharge to [facility].

## 2020-01-28 NOTE — CONSULT NOTE ADULT - ASSESSMENT
EKG - Sinus R @ 66, RBBB    Echo: < from: Transthoracic Echocardiogram (01.25.20 @ 13:25) >  1. Mitral annular calcification and calcified mitral  leaflets. Minimal mitral regurgitation.  2. Calcified trileaflet aortic valve with decreased  opening. Peak transaortic valve gradient equals 43 mm Hg,  mean transaortic valve gradient equals 23 mm Hg, estimated  aortic valve area equals 0.6 sqcm (by continuity equation),  aortic valve velocity time integral equals 61 cm,  consistent with severe aortic stenosis. Minimal aortic  regurgitation.  3. Severe concentric left ventricular hypertrophy.  4. Endocardium not well visualized; hyperdynamic left  ventricular systolic function.  5. The right ventricle is not well visualized; grossly  normal right ventricular systolic function.    < end of copied text >      JESSICA:   < from: JESSICA w/o TTE (w/3D Echo) (10.23.18 @ 14:58) >  1. Mitral annular calcification, otherwise normal mitral  valve. Mild-moderate mitral regurgitation.  2. Calcified trileaflet aortic valve with decreased  opening. Peak transaortic valve gradient equals 43 mm Hg,  mean transaortic valve gradient equals 23 mm Hg, estimated  aortic valve area equals 0.5 sqcm (by continuity equation  and by planimetry), consistent with severe aortic stenosis.  Minimal aortic regurgitation.  3. Normal aortic root.  Mild non-mobile atherosclerotic  plaque in the aortic arch and descending thoracic aorta.  4. Normal left atrium.  No left atrial or left atrial  appendage thrombus.  5. Normal left ventricular systolic function. No segmental  wall motion abnormalities.  6. Normal right ventricular size and function.  7. Contrast injection demonstrates no evidence of a patent  foramen ovale.    < end of copied text >    a/p     1) Dizziness/NSVT: SVT vs NSVT ,RBBB at baseline pt asymptomatic c/w metoprolol 25 mg po BID Found to have Carotid artery ds,  paradoxical low flow low gradient sever AS. Discussed with daughter Maria De Jesus. aware of AS , discussed with Dr. Balderas last year (op cardiologist) Discussed TAVR option again,  family wants conservative management     2) CAD s/p PCI - cont asa plavix      3) HTN: lisinopril 10 mg po daily, and metoprolol

## 2020-01-28 NOTE — PROGRESS NOTE ADULT - SUBJECTIVE AND OBJECTIVE BOX
[facility] Division of Hospital Medicine  Rani Carney MD  Pager [phone]    Patient is a 89y old  Female who presents with a chief complaint of Carotid artery dissection       SUBJECTIVE / OVERNIGHT EVENTS: had 17 beats of NSVT; now reports not feeling well, "out of sorts" "ears clogged"  appears much more alert than yesterday, eating breakfast      MEDICATIONS  (STANDING):  aspirin enteric coated 81 milliGRAM(s) Oral daily  atorvastatin 10 milliGRAM(s) Oral at bedtime  clopidogrel Tablet 75 milliGRAM(s) Oral daily  dextrose 5%. 1000 milliLiter(s) (50 mL/Hr) IV Continuous <Continuous>  dextrose 50% Injectable 12.5 Gram(s) IV Push once  dextrose 50% Injectable 25 Gram(s) IV Push once  dextrose 50% Injectable 25 Gram(s) IV Push once  gabapentin 300 milliGRAM(s) Oral two times a day  heparin  Injectable 5000 Unit(s) SubCutaneous every 8 hours  insulin lispro (HumaLOG) corrective regimen sliding scale   SubCutaneous three times a day before meals  insulin lispro (HumaLOG) corrective regimen sliding scale   SubCutaneous at bedtime  lisinopril 10 milliGRAM(s) Oral daily  magnesium oxide 400 milliGRAM(s) Oral three times a day with meals  memantine 10 milliGRAM(s) Oral daily  metoprolol tartrate 25 milliGRAM(s) Oral two times a day  petrolatum white Ointment 1 Application(s) Topical three times a day  timolol 0.5% Solution 1 Drop(s) Both EYES two times a day    MEDICATIONS  (PRN):  dextrose 40% Gel 15 Gram(s) Oral once PRN Blood Glucose LESS THAN 70 milliGRAM(s)/deciliter  glucagon  Injectable 1 milliGRAM(s) IntraMuscular once PRN Glucose LESS THAN 70 milligrams/deciliter      CAPILLARY BLOOD GLUCOSE  POCT Blood Glucose.: 205 mg/dL (28 Jan 2020 12:29)  POCT Blood Glucose.: 150 mg/dL (28 Jan 2020 08:51)  POCT Blood Glucose.: 203 mg/dL (27 Jan 2020 21:32)  POCT Blood Glucose.: 163 mg/dL (27 Jan 2020 17:21)          PHYSICAL EXAM:  Vital Signs Last 24 Hrs  T(F): 97.3 (28 Jan 2020 06:49), Max: 97.3 (28 Jan 2020 06:49)  HR: 66 (28 Jan 2020 10:02) (61 - 66)  BP: 126/76 (28 Jan 2020 10:02) (117/63 - 149/67)  RR: 15 (28 Jan 2020 10:02) (15 - 18)  SpO2: 99% (28 Jan 2020 10:02) (97% - 99%)    CONSTITUTIONAL: NADar  ENMT: Moist oral mucosa  RESPIRATORY: Normal respiratory effort; lungs are clear to auscultation bilaterally  CARDIOVASCULAR: Regular rate and rhythm; No lower extremity edema;   ABDOMEN: Nontender to palpation, normoactive bowel sounds  MUSCULOSKELETAL: no clubbing or cyanosis of digits; no joint swelling or tenderness to palpation  PSYCH: affect appropriate  NEUROLOGY: no gross sensory deficits       LABS:                        12.5   15.27 )-----------( 220      ( 28 Jan 2020 11:02 )             38.5     01-28    140  |  101  |  20  ----------------------------<  147<H>  4.4   |  27  |  0.78    Ca    9.4      28 Jan 2020 05:06  Mg     1.7     01-28

## 2020-01-28 NOTE — PROVIDER CONTACT NOTE (OTHER) - RECOMMENDATIONS
Give morning blood pressure medications as ordered
250cc bolus
For tele pa to come assess pt.
bolus
continue to monitor on tele
will continue to monitor
will continue to monitor and maybe give another bolus

## 2020-01-28 NOTE — PROGRESS NOTE ADULT - ASSESSMENT
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 Afebrile.   No sinus tenderness on exam. murmur+     No h/o sinus infections, steroid treatment in past.   I reviewed MRI with neuroradiologist- changes look chronic and no evidence of bony erosion.     Leucocytosis.  -WBC trending up.  -no localizing signs of infection on exam  -would check blood culture x 2  -if diarrhea check c dif  -defer antibiotics for now      Ana Apodaca MD  Pager: [phone]  After 5 PM or weekends please call fellow on call or office [phone]

## 2020-01-29 LAB
ANION GAP SERPL CALC-SCNC: 11 MMO/L — SIGNIFICANT CHANGE UP (ref 7–14)
BASOPHILS # BLD AUTO: 0.02 K/UL — SIGNIFICANT CHANGE UP (ref 0–0.2)
BASOPHILS NFR BLD AUTO: 0.2 % — SIGNIFICANT CHANGE UP (ref 0–2)
BUN SERPL-MCNC: 19 MG/DL — SIGNIFICANT CHANGE UP (ref 7–23)
CALCIUM SERPL-MCNC: 9 MG/DL — SIGNIFICANT CHANGE UP (ref 8.4–10.5)
CHLORIDE SERPL-SCNC: 100 MMOL/L — SIGNIFICANT CHANGE UP (ref 98–107)
CO2 SERPL-SCNC: 26 MMOL/L — SIGNIFICANT CHANGE UP (ref 22–31)
CREAT SERPL-MCNC: 0.65 MG/DL — SIGNIFICANT CHANGE UP (ref 0.5–1.3)
EOSINOPHIL # BLD AUTO: 0.08 K/UL — SIGNIFICANT CHANGE UP (ref 0–0.5)
EOSINOPHIL NFR BLD AUTO: 0.8 % — SIGNIFICANT CHANGE UP (ref 0–6)
GLUCOSE BLDC GLUCOMTR-MCNC: 141 MG/DL — HIGH (ref 70–99)
GLUCOSE BLDC GLUCOMTR-MCNC: 163 MG/DL — HIGH (ref 70–99)
GLUCOSE BLDC GLUCOMTR-MCNC: 182 MG/DL — HIGH (ref 70–99)
GLUCOSE BLDC GLUCOMTR-MCNC: 186 MG/DL — HIGH (ref 70–99)
GLUCOSE SERPL-MCNC: 196 MG/DL — HIGH (ref 70–99)
HCT VFR BLD CALC: 37.8 % — SIGNIFICANT CHANGE UP (ref 34.5–45)
HGB BLD-MCNC: 11.8 G/DL — SIGNIFICANT CHANGE UP (ref 11.5–15.5)
IMM GRANULOCYTES NFR BLD AUTO: 0.6 % — SIGNIFICANT CHANGE UP (ref 0–1.5)
LYMPHOCYTES # BLD AUTO: 1.39 K/UL — SIGNIFICANT CHANGE UP (ref 1–3.3)
LYMPHOCYTES # BLD AUTO: 13.3 % — SIGNIFICANT CHANGE UP (ref 13–44)
MAGNESIUM SERPL-MCNC: 1.7 MG/DL — SIGNIFICANT CHANGE UP (ref 1.6–2.6)
MCHC RBC-ENTMCNC: 30.6 PG — SIGNIFICANT CHANGE UP (ref 27–34)
MCHC RBC-ENTMCNC: 31.2 % — LOW (ref 32–36)
MCV RBC AUTO: 98.2 FL — SIGNIFICANT CHANGE UP (ref 80–100)
MONOCYTES # BLD AUTO: 0.41 K/UL — SIGNIFICANT CHANGE UP (ref 0–0.9)
MONOCYTES NFR BLD AUTO: 3.9 % — SIGNIFICANT CHANGE UP (ref 2–14)
NEUTROPHILS # BLD AUTO: 8.49 K/UL — HIGH (ref 1.8–7.4)
NEUTROPHILS NFR BLD AUTO: 81.2 % — HIGH (ref 43–77)
NRBC # FLD: 0 K/UL — SIGNIFICANT CHANGE UP (ref 0–0)
PHOSPHATE SERPL-MCNC: 2.8 MG/DL — SIGNIFICANT CHANGE UP (ref 2.5–4.5)
PLATELET # BLD AUTO: 221 K/UL — SIGNIFICANT CHANGE UP (ref 150–400)
PMV BLD: 10.8 FL — SIGNIFICANT CHANGE UP (ref 7–13)
POTASSIUM SERPL-MCNC: 4.6 MMOL/L — SIGNIFICANT CHANGE UP (ref 3.5–5.3)
POTASSIUM SERPL-SCNC: 4.6 MMOL/L — SIGNIFICANT CHANGE UP (ref 3.5–5.3)
RBC # BLD: 3.85 M/UL — SIGNIFICANT CHANGE UP (ref 3.8–5.2)
RBC # FLD: 14.3 % — SIGNIFICANT CHANGE UP (ref 10.3–14.5)
SODIUM SERPL-SCNC: 137 MMOL/L — SIGNIFICANT CHANGE UP (ref 135–145)
SPECIMEN SOURCE: SIGNIFICANT CHANGE UP
SPECIMEN SOURCE: SIGNIFICANT CHANGE UP
WBC # BLD: 10.45 K/UL — SIGNIFICANT CHANGE UP (ref 3.8–10.5)
WBC # FLD AUTO: 10.45 K/UL — SIGNIFICANT CHANGE UP (ref 3.8–10.5)

## 2020-01-29 PROCEDURE — 99232 SBSQ HOSP IP/OBS MODERATE 35: CPT

## 2020-01-29 PROCEDURE — 99233 SBSQ HOSP IP/OBS HIGH 50: CPT

## 2020-01-29 RX ADMIN — CLOPIDOGREL BISULFATE 75 MILLIGRAM(S): 75 TABLET, FILM COATED ORAL at 12:51

## 2020-01-29 RX ADMIN — HEPARIN SODIUM 5000 UNIT(S): 5000 INJECTION INTRAVENOUS; SUBCUTANEOUS at 06:40

## 2020-01-29 RX ADMIN — Medication 1 APPLICATION(S): at 06:41

## 2020-01-29 RX ADMIN — GABAPENTIN 300 MILLIGRAM(S): 400 CAPSULE ORAL at 06:41

## 2020-01-29 RX ADMIN — Medication 1 APPLICATION(S): at 20:39

## 2020-01-29 RX ADMIN — Medication 1 APPLICATION(S): at 12:51

## 2020-01-29 RX ADMIN — HEPARIN SODIUM 5000 UNIT(S): 5000 INJECTION INTRAVENOUS; SUBCUTANEOUS at 12:52

## 2020-01-29 RX ADMIN — Medication 2: at 09:24

## 2020-01-29 RX ADMIN — GABAPENTIN 300 MILLIGRAM(S): 400 CAPSULE ORAL at 17:45

## 2020-01-29 RX ADMIN — MEMANTINE HYDROCHLORIDE 10 MILLIGRAM(S): 10 TABLET ORAL at 12:51

## 2020-01-29 RX ADMIN — Medication 2: at 17:45

## 2020-01-29 RX ADMIN — LISINOPRIL 10 MILLIGRAM(S): 2.5 TABLET ORAL at 06:41

## 2020-01-29 RX ADMIN — Medication 1 DROP(S): at 06:41

## 2020-01-29 RX ADMIN — Medication 25 MILLIGRAM(S): at 06:41

## 2020-01-29 RX ADMIN — Medication 1 DROP(S): at 17:44

## 2020-01-29 RX ADMIN — ATORVASTATIN CALCIUM 10 MILLIGRAM(S): 80 TABLET, FILM COATED ORAL at 20:39

## 2020-01-29 RX ADMIN — Medication 81 MILLIGRAM(S): at 12:51

## 2020-01-29 RX ADMIN — HEPARIN SODIUM 5000 UNIT(S): 5000 INJECTION INTRAVENOUS; SUBCUTANEOUS at 20:39

## 2020-01-29 NOTE — PROGRESS NOTE ADULT - SUBJECTIVE AND OBJECTIVE BOX
Jimbo Trevino MD  Interventional Cardiology / Advance Heart Failure and Cardiac Transplant Specialist  [place] Office : [address] [place] [address] [address] [address]  Tel:   [place] Office : [address] [facility] [place] [address]  Tel: [phone]  Cell : [phone]    Pt is lying in bed comfortable not in distress, no chest pains no SOB no palpitations  	  MEDICATIONS:  aspirin enteric coated 81 milliGRAM(s) Oral daily  clopidogrel Tablet 75 milliGRAM(s) Oral daily  heparin  Injectable 5000 Unit(s) SubCutaneous every 8 hours  lisinopril 10 milliGRAM(s) Oral daily  metoprolol tartrate 25 milliGRAM(s) Oral two times a day        gabapentin 300 milliGRAM(s) Oral two times a day  memantine 10 milliGRAM(s) Oral daily      atorvastatin 10 milliGRAM(s) Oral at bedtime  dextrose 40% Gel 15 Gram(s) Oral once PRN  dextrose 50% Injectable 12.5 Gram(s) IV Push once  dextrose 50% Injectable 25 Gram(s) IV Push once  dextrose 50% Injectable 25 Gram(s) IV Push once  glucagon  Injectable 1 milliGRAM(s) IntraMuscular once PRN  insulin lispro (HumaLOG) corrective regimen sliding scale   SubCutaneous three times a day before meals  insulin lispro (HumaLOG) corrective regimen sliding scale   SubCutaneous at bedtime    dextrose 5%. 1000 milliLiter(s) IV Continuous <Continuous>  petrolatum white Ointment 1 Application(s) Topical three times a day  timolol 0.5% Solution 1 Drop(s) Both EYES two times a day      PAST MEDICAL/SURGICAL HISTORY  PAST MEDICAL & SURGICAL HISTORY:  Glaucoma  Dementia  CAD (coronary artery disease)  DM (diabetes mellitus)  HLD (hyperlipidemia)  HTN (hypertension)  S/P hip replacement, bilateral  S/P coronary artery stent placement      SOCIAL HISTORY: Substance Use (street drugs): ( x ) never used  (  ) other:    FAMILY HISTORY:  Family history of hypertension: ~ sister and brother      PHYSICAL EXAM:  T(C): 36.5 (01-29-20 @ 14:27), Max: 36.9 (01-28-20 @ 20:36)  HR: 60 (01-29-20 @ 14:27) (60 - 64)  BP: 106/61 (01-29-20 @ 14:27) (106/61 - 131/65)  RR: 18 (01-29-20 @ 14:27) (18 - 18)  SpO2: 97% (01-29-20 @ 14:27) (97% - 100%)  Wt(kg): --  I&O's Summary    28 Jan 2020 07:01  -  29 Jan 2020 07:00  --------------------------------------------------------  IN: 240 mL / OUT: 0 mL / NET: 240 mL          EYES: EOMI, PERRLA, conjunctiva and sclera clear  ENMT: No tonsillar erythema, exudates, or enlargement; Moist mucous membranes, Good dentition, No lesions  Cardiovascular: Normal S1 S2, No JVD, 2/6 ejection systolic murmurs, No edema  Respiratory: Lungs clear to auscultation	  Gastrointestinal:  Soft, Non-tender, + BS	  Extremities: Normal range of motion, No clubbing, cyanosis or edema                                      11.8   10.45 )-----------( 221      ( 29 Jan 2020 10:19 )             37.8     01-29    137  |  100  |  19  ----------------------------<  196<H>  4.6   |  26  |  0.65    Ca    9.0      29 Jan 2020 10:15  Phos  2.8     01-29  Mg     1.7     01-29      proBNP:   Lipid Profile:   HgA1c:   TSH:     Consultant(s) Notes Reviewed:  [x ] YES  [ ] NO    Care Discussed with Consultants/Other Providers [ x] YES  [ ] NO    Imaging Personally Reviewed independently:  [x] YES  [ ] NO    All labs, radiologic studies, vitals, orders and medications list reviewed. Patient is seen and examined at bedside. Case discussed with medical team.

## 2020-01-29 NOTE — PROGRESS NOTE ADULT - PROBLEM SELECTOR PLAN 2
another episode of NVST  no further events   will dc tele  family wishes for conservative management

## 2020-01-29 NOTE — PROGRESS NOTE ADULT - ASSESSMENT
89 year old female, with past history significant for HTN, CAD s/p Stent, Type-II DM, and HLD, presented to the ED secondary to dizziness, pt found to have R carotid artery dissection. no intervention per neurosurgery. medical management per neuro and neurosurgery. plan to discharge to [facility] [place].

## 2020-01-29 NOTE — PROVIDER CONTACT NOTE (OTHER) - ACTION/TREATMENT ORDERED:
spoke w/ HIPOLITO Jacobsen, will continue to monitor
Continue to monitor.
Give morning blood pressure medications as ordered, continue to monitor vital signs q4
NP notified and will cont. to monitor.
NP notified and will cont. to monitor.
tele pa notified. VS q4h and will cont. to monitor.
tele pa notified. will cont. to monitor.
continue to monitor on tele
tele pa notified and came to see patient. As per orders rectal temp, FS, venus blood gas and medications as per orders. Will cont. to monitor and vital signs q4h and neuro q4h.

## 2020-01-29 NOTE — PROGRESS NOTE ADULT - SUBJECTIVE AND OBJECTIVE BOX
[facility] Division of Hospital Medicine  Rani Carney MD  Pager [phone]    Patient is a 89y old  Female who presents with a chief complaint of Carotid artery dissection       SUBJECTIVE / OVERNIGHT EVENTS: was up eating breakfast earlier, now resting; uneventful night      MEDICATIONS  (STANDING):  aspirin enteric coated 81 milliGRAM(s) Oral daily  atorvastatin 10 milliGRAM(s) Oral at bedtime  clopidogrel Tablet 75 milliGRAM(s) Oral daily  dextrose 5%. 1000 milliLiter(s) (50 mL/Hr) IV Continuous <Continuous>  dextrose 50% Injectable 12.5 Gram(s) IV Push once  dextrose 50% Injectable 25 Gram(s) IV Push once  dextrose 50% Injectable 25 Gram(s) IV Push once  gabapentin 300 milliGRAM(s) Oral two times a day  heparin  Injectable 5000 Unit(s) SubCutaneous every 8 hours  insulin lispro (HumaLOG) corrective regimen sliding scale   SubCutaneous three times a day before meals  insulin lispro (HumaLOG) corrective regimen sliding scale   SubCutaneous at bedtime  lisinopril 10 milliGRAM(s) Oral daily  memantine 10 milliGRAM(s) Oral daily  metoprolol tartrate 25 milliGRAM(s) Oral two times a day  petrolatum white Ointment 1 Application(s) Topical three times a day  timolol 0.5% Solution 1 Drop(s) Both EYES two times a day    MEDICATIONS  (PRN):  dextrose 40% Gel 15 Gram(s) Oral once PRN Blood Glucose LESS THAN 70 milliGRAM(s)/deciliter  glucagon  Injectable 1 milliGRAM(s) IntraMuscular once PRN Glucose LESS THAN 70 milligrams/deciliter      CAPILLARY BLOOD GLUCOSE  POCT Blood Glucose.: 141 mg/dL (29 Jan 2020 12:25)  POCT Blood Glucose.: 163 mg/dL (29 Jan 2020 09:01)  POCT Blood Glucose.: 220 mg/dL (28 Jan 2020 22:07)  POCT Blood Glucose.: 222 mg/dL (28 Jan 2020 17:22)            PHYSICAL EXAM:  Vital Signs Last 24 Hrs  T(F): 97.7 (29 Jan 2020 06:40), Max: 98.5 (28 Jan 2020 20:36)  HR: 61 (29 Jan 2020 06:40) (61 - 64)  BP: 131/65 (29 Jan 2020 06:40) (107/59 - 131/65)  RR: 18 (29 Jan 2020 06:40) (18 - 18)  SpO2: 100% (29 Jan 2020 06:40) (100% - 100%)    CONSTITUTIONAL: NAD  ENMT: Moist oral mucosa  RESPIRATORY: Normal respiratory effort; lungs are clear to auscultation bilaterally  CARDIOVASCULAR: Regular rate and rhythm; No lower extremity edema;  ABDOMEN: Nontender to palpation, normoactive bowel sounds  MUSCULOSKELETAL:  no clubbing or cyanosis of digits; no joint swelling or tenderness to palpation  PSYCH: confused at times  NEUROLOGY: no gross sensory deficits       LABS:                        11.8   10.45 )-----------( 221      ( 29 Jan 2020 10:19 )             37.8     01-29    137  |  100  |  19  ----------------------------<  196<H>  4.6   |  26  |  0.65    Ca    9.0      29 Jan 2020 10:15  Phos  2.8     01-29  Mg     1.7     01-29

## 2020-01-29 NOTE — PROGRESS NOTE ADULT - SUBJECTIVE AND OBJECTIVE BOX
Follow Up:  MRI findings.    Inverval History/ROS:  "sleepy"  denies fever, chills, sinus pain, diarrhea.    Allergies  No Known Allergies        ANTIMICROBIALS:  ceftriaxone 1/21- 1/24    OTHER MEDS:  aspirin enteric coated 81 milliGRAM(s) Oral daily  atorvastatin 10 milliGRAM(s) Oral at bedtime  clopidogrel Tablet 75 milliGRAM(s) Oral daily  dextrose 40% Gel 15 Gram(s) Oral once PRN  dextrose 5%. 1000 milliLiter(s) IV Continuous <Continuous>  dextrose 50% Injectable 12.5 Gram(s) IV Push once  dextrose 50% Injectable 25 Gram(s) IV Push once  dextrose 50% Injectable 25 Gram(s) IV Push once  gabapentin 300 milliGRAM(s) Oral two times a day  glucagon  Injectable 1 milliGRAM(s) IntraMuscular once PRN  heparin  Injectable 5000 Unit(s) SubCutaneous every 8 hours  insulin lispro (HumaLOG) corrective regimen sliding scale   SubCutaneous three times a day before meals  insulin lispro (HumaLOG) corrective regimen sliding scale   SubCutaneous at bedtime  lisinopril 10 milliGRAM(s) Oral daily  magnesium oxide 400 milliGRAM(s) Oral three times a day with meals  memantine 10 milliGRAM(s) Oral daily  metoprolol tartrate 25 milliGRAM(s) Oral two times a day  petrolatum white Ointment 1 Application(s) Topical three times a day  timolol 0.5% Solution 1 Drop(s) Both EYES two times a day    Vital Signs Last 24 Hrs  T(F): 97.7 (01-29-20 @ 14:27), Max: 98.5 (01-28-20 @ 20:36)  HR: 60 (01-29-20 @ 14:27)  BP: 106/61 (01-29-20 @ 14:27)  RR: 18 (01-29-20 @ 14:27)  SpO2: 97% (01-29-20 @ 14:27) (97% - 100%)    PHYSICAL EXAM:  General:  non-toxic, awakens easily, comfortable on RA.  HEAD/EYES: white sclera   ENT:   supple, no tenderness over sinuses  Cardiovascular:   murmur   Respiratory:   clear to ausculation bilaterally  GI:   soft, non-tender, normal bowel sounds  : no kennedy   Musculoskeletal:   no synovitis  Neurologic:   non-focal exam   Skin:   no rash  Lymph: no cervical lymphadenopathy  Psychiatric:   appropriate affect, alert & oriented  Lines:   no phlebitis peripheral iv right arm                              11.8   10.45 )-----------( 221      ( 29 Jan 2020 10:19 )             37.8 01-29    137  |  100  |  19  ----------------------------<  196  4.6   |  26  |  0.65  Ca    9.0      29 Jan 2020 10:15Phos  2.8     01-29Mg     1.7     01-29      Culture - Blood (01.28.20 @ 16:35)    Culture - Blood:   NO ORGANISMS ISOLATED  NO ORGANISMS ISOLATED AT 24 HOURS    Specimen Source: BLOOD VENOUS    Culture - Blood (01.28.20 @ 16:35)    Culture - Blood:   NO ORGANISMS ISOLATED  NO ORGANISMS ISOLATED AT 24 HOURS    Specimen Source: BLOOD PERIPHERAL          MICROBIOLOGY:  Culture - Urine (01.21.20 @ 17:38)    Culture - Urine:   NO GROWTH AT 24 HOURS    Specimen Source: URINE MIDSTREAM      RADIOLOGY:    < from: MR Angio Neck No Cont (01.26.20 @ 20:05) >  IMPRESSION:    Unchanged volume loss microvascular disease, no restricted diffusion, hemorrhage or midline shift. Patent proximal anterior, middle, and posterior cervical arteries, with stenosis of distal branch vessels.    Obstructed right ostiomeatal complex and right sphenoethmoidal recess with right sphenoid mucocele right maxillary sinus complete opacification with inspissated secretions and fluid level, infection with fungal sinusitis not excluded.    Redemonstration variable caliber rightcommon carotid artery and linear dissection flap extending from the right common carotid artery into the right internal carotid artery by 3.5 cm without hemodynamically stenosis at right ICA origin.    < end of copied text >

## 2020-01-29 NOTE — PROVIDER CONTACT NOTE (OTHER) - ASSESSMENT
/77 HR 61; patient denies any symptoms; tele monitor showing sinus rhythm
patient c/o right sided chest pain "heaviness". pt states chest "heaviness" resolved. vital signs stable
10 beats vtach on tele. HR 75. Pt asymptomatic and resting in bed.
4 beats of vtach on tele. HR 58 and pt asymptomatic resting in bed.
Patient had 5 beats Vtach. Patient is sleeping comfortably.
Pt had 5 beats of vtach on tele and HR 61. Pt resting in bed.
Pt non arousable and somnolent. Pt responds to pain by raising eyebrows.  BL pupils 1mm and sluggish. Pt unable to perform neuro check as per pt unable to follow commands and answer questions at this time. Pt HR 62, /60, temp 97.5, RR 18 and O2 saturation 95% on RA.
asymptomatic, vitals noted in flowsheet
drowsy
pt is little more awake now
pt is sleepy and reported being tired
pt states she feels tired
vitals as noted. Pt asymptomatic resting in bed.

## 2020-01-29 NOTE — PROGRESS NOTE ADULT - ASSESSMENT
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   No sinus tenderness on exam.   murmur+     No h/o sinus infections, steroid treatment in past.     I reviewed MRI with neuroradiologist- changes look chronic and no evidence of bony erosion.   WBC trended up to 15K 1/28  w/o localizing signs of infection on exam   blood culture x 2 testing- no growth x 24 h    -would continue to defer antibiotics  -trend temp, WBC  -follow blood culture results      Ana Apodaca MD  Pager: [phone]  After 5 PM or weekends please call fellow on call or office [phone]

## 2020-01-30 LAB
ANION GAP SERPL CALC-SCNC: 10 MMO/L — SIGNIFICANT CHANGE UP (ref 7–14)
BASOPHILS # BLD AUTO: 0.02 K/UL — SIGNIFICANT CHANGE UP (ref 0–0.2)
BASOPHILS NFR BLD AUTO: 0.3 % — SIGNIFICANT CHANGE UP (ref 0–2)
BUN SERPL-MCNC: 23 MG/DL — SIGNIFICANT CHANGE UP (ref 7–23)
CALCIUM SERPL-MCNC: 8.5 MG/DL — SIGNIFICANT CHANGE UP (ref 8.4–10.5)
CHLORIDE SERPL-SCNC: 103 MMOL/L — SIGNIFICANT CHANGE UP (ref 98–107)
CO2 SERPL-SCNC: 26 MMOL/L — SIGNIFICANT CHANGE UP (ref 22–31)
CREAT SERPL-MCNC: 0.64 MG/DL — SIGNIFICANT CHANGE UP (ref 0.5–1.3)
EOSINOPHIL # BLD AUTO: 0.12 K/UL — SIGNIFICANT CHANGE UP (ref 0–0.5)
EOSINOPHIL NFR BLD AUTO: 1.8 % — SIGNIFICANT CHANGE UP (ref 0–6)
GLUCOSE BLDC GLUCOMTR-MCNC: 177 MG/DL — HIGH (ref 70–99)
GLUCOSE BLDC GLUCOMTR-MCNC: 186 MG/DL — HIGH (ref 70–99)
GLUCOSE BLDC GLUCOMTR-MCNC: 215 MG/DL — HIGH (ref 70–99)
GLUCOSE BLDC GLUCOMTR-MCNC: 297 MG/DL — HIGH (ref 70–99)
GLUCOSE SERPL-MCNC: 162 MG/DL — HIGH (ref 70–99)
HCT VFR BLD CALC: 35.8 % — SIGNIFICANT CHANGE UP (ref 34.5–45)
HGB BLD-MCNC: 11.6 G/DL — SIGNIFICANT CHANGE UP (ref 11.5–15.5)
IMM GRANULOCYTES NFR BLD AUTO: 0.9 % — SIGNIFICANT CHANGE UP (ref 0–1.5)
LYMPHOCYTES # BLD AUTO: 1.58 K/UL — SIGNIFICANT CHANGE UP (ref 1–3.3)
LYMPHOCYTES # BLD AUTO: 23.1 % — SIGNIFICANT CHANGE UP (ref 13–44)
MAGNESIUM SERPL-MCNC: 1.8 MG/DL — SIGNIFICANT CHANGE UP (ref 1.6–2.6)
MCHC RBC-ENTMCNC: 31.4 PG — SIGNIFICANT CHANGE UP (ref 27–34)
MCHC RBC-ENTMCNC: 32.4 % — SIGNIFICANT CHANGE UP (ref 32–36)
MCV RBC AUTO: 96.8 FL — SIGNIFICANT CHANGE UP (ref 80–100)
MONOCYTES # BLD AUTO: 0.52 K/UL — SIGNIFICANT CHANGE UP (ref 0–0.9)
MONOCYTES NFR BLD AUTO: 7.6 % — SIGNIFICANT CHANGE UP (ref 2–14)
NEUTROPHILS # BLD AUTO: 4.53 K/UL — SIGNIFICANT CHANGE UP (ref 1.8–7.4)
NEUTROPHILS NFR BLD AUTO: 66.3 % — SIGNIFICANT CHANGE UP (ref 43–77)
NRBC # FLD: 0 K/UL — SIGNIFICANT CHANGE UP (ref 0–0)
PLATELET # BLD AUTO: 194 K/UL — SIGNIFICANT CHANGE UP (ref 150–400)
PMV BLD: 10.8 FL — SIGNIFICANT CHANGE UP (ref 7–13)
POTASSIUM SERPL-MCNC: 4.6 MMOL/L — SIGNIFICANT CHANGE UP (ref 3.5–5.3)
POTASSIUM SERPL-SCNC: 4.6 MMOL/L — SIGNIFICANT CHANGE UP (ref 3.5–5.3)
RBC # BLD: 3.7 M/UL — LOW (ref 3.8–5.2)
RBC # FLD: 14.4 % — SIGNIFICANT CHANGE UP (ref 10.3–14.5)
SODIUM SERPL-SCNC: 139 MMOL/L — SIGNIFICANT CHANGE UP (ref 135–145)
WBC # BLD: 6.83 K/UL — SIGNIFICANT CHANGE UP (ref 3.8–10.5)
WBC # FLD AUTO: 6.83 K/UL — SIGNIFICANT CHANGE UP (ref 3.8–10.5)

## 2020-01-30 PROCEDURE — 99233 SBSQ HOSP IP/OBS HIGH 50: CPT

## 2020-01-30 RX ADMIN — MEMANTINE HYDROCHLORIDE 10 MILLIGRAM(S): 10 TABLET ORAL at 12:50

## 2020-01-30 RX ADMIN — Medication 1 APPLICATION(S): at 21:53

## 2020-01-30 RX ADMIN — Medication 2: at 08:51

## 2020-01-30 RX ADMIN — GABAPENTIN 300 MILLIGRAM(S): 400 CAPSULE ORAL at 17:43

## 2020-01-30 RX ADMIN — Medication 81 MILLIGRAM(S): at 12:50

## 2020-01-30 RX ADMIN — Medication 1 DROP(S): at 17:43

## 2020-01-30 RX ADMIN — ATORVASTATIN CALCIUM 10 MILLIGRAM(S): 80 TABLET, FILM COATED ORAL at 21:54

## 2020-01-30 RX ADMIN — Medication 25 MILLIGRAM(S): at 17:43

## 2020-01-30 RX ADMIN — GABAPENTIN 300 MILLIGRAM(S): 400 CAPSULE ORAL at 05:27

## 2020-01-30 RX ADMIN — Medication 6: at 12:50

## 2020-01-30 RX ADMIN — Medication 2: at 17:43

## 2020-01-30 RX ADMIN — Medication 25 MILLIGRAM(S): at 05:27

## 2020-01-30 RX ADMIN — LISINOPRIL 10 MILLIGRAM(S): 2.5 TABLET ORAL at 05:27

## 2020-01-30 RX ADMIN — Medication 1 DROP(S): at 05:29

## 2020-01-30 RX ADMIN — Medication 1 APPLICATION(S): at 05:29

## 2020-01-30 RX ADMIN — HEPARIN SODIUM 5000 UNIT(S): 5000 INJECTION INTRAVENOUS; SUBCUTANEOUS at 05:27

## 2020-01-30 RX ADMIN — CLOPIDOGREL BISULFATE 75 MILLIGRAM(S): 75 TABLET, FILM COATED ORAL at 12:50

## 2020-01-30 RX ADMIN — Medication 1 APPLICATION(S): at 12:51

## 2020-01-30 RX ADMIN — HEPARIN SODIUM 5000 UNIT(S): 5000 INJECTION INTRAVENOUS; SUBCUTANEOUS at 12:53

## 2020-01-30 RX ADMIN — HEPARIN SODIUM 5000 UNIT(S): 5000 INJECTION INTRAVENOUS; SUBCUTANEOUS at 21:54

## 2020-01-30 NOTE — PROGRESS NOTE ADULT - PROBLEM SELECTOR PROBLEM 2
NSVT (nonsustained ventricular tachycardia)
ECG abnormality
NSVT (nonsustained ventricular tachycardia)

## 2020-01-30 NOTE — PROGRESS NOTE ADULT - SUBJECTIVE AND OBJECTIVE BOX
[facility] Division of Hospital Medicine  Rani Carney MD  Pager [phone]    Patient is a 89y old  Female who presents with a chief complaint of Carotid artery dissection      SUBJECTIVE / OVERNIGHT EVENTS: pt seen earlier this AM, without complaints, slept well    MEDICATIONS  (STANDING):  aspirin enteric coated 81 milliGRAM(s) Oral daily  atorvastatin 10 milliGRAM(s) Oral at bedtime  clopidogrel Tablet 75 milliGRAM(s) Oral daily  dextrose 5%. 1000 milliLiter(s) (50 mL/Hr) IV Continuous <Continuous>  dextrose 50% Injectable 12.5 Gram(s) IV Push once  dextrose 50% Injectable 25 Gram(s) IV Push once  dextrose 50% Injectable 25 Gram(s) IV Push once  gabapentin 300 milliGRAM(s) Oral two times a day  heparin  Injectable 5000 Unit(s) SubCutaneous every 8 hours  insulin lispro (HumaLOG) corrective regimen sliding scale   SubCutaneous three times a day before meals  insulin lispro (HumaLOG) corrective regimen sliding scale   SubCutaneous at bedtime  lisinopril 10 milliGRAM(s) Oral daily  memantine 10 milliGRAM(s) Oral daily  metoprolol tartrate 25 milliGRAM(s) Oral two times a day  petrolatum white Ointment 1 Application(s) Topical three times a day  timolol 0.5% Solution 1 Drop(s) Both EYES two times a day    MEDICATIONS  (PRN):  dextrose 40% Gel 15 Gram(s) Oral once PRN Blood Glucose LESS THAN 70 milliGRAM(s)/deciliter  glucagon  Injectable 1 milliGRAM(s) IntraMuscular once PRN Glucose LESS THAN 70 milligrams/deciliter      CAPILLARY BLOOD GLUCOSE  POCT Blood Glucose.: 297 mg/dL (30 Jan 2020 12:39)  POCT Blood Glucose.: 177 mg/dL (30 Jan 2020 08:39)  POCT Blood Glucose.: 186 mg/dL (29 Jan 2020 21:45)  POCT Blood Glucose.: 182 mg/dL (29 Jan 2020 17:32)          PHYSICAL EXAM:  Vital Signs Last 24 Hrs  T(F): 97.1 (30 Jan 2020 13:04), Max: 98.4 (30 Jan 2020 05:48)  HR: 56 (30 Jan 2020 13:04) (56 - 63)  BP: 118/67 (30 Jan 2020 13:04) (116/63 - 132/66)  RR: 17 (30 Jan 2020 13:04) (16 - 17)  SpO2: 96% (30 Jan 2020 13:04) (96% - 100%)    CONSTITUTIONAL: NAD  ENMT: Moist oral mucosa  RESPIRATORY: Normal respiratory effort; lungs are clear to auscultation bilaterally  CARDIOVASCULAR: Regular rate and rhythm, ++murmur; No lower extremity edema;   ABDOMEN: Nontender to palpation, normoactive bowel sounds, no rebound/guarding; No hepatosplenomegaly  MUSCULOSKELETAL:  no clubbing or cyanosis of digits; no joint swelling or tenderness to palpation  PSYCH: affect appropriate  NEUROLOGY:  no gross sensory deficits       LABS:                        11.6   6.83  )-----------( 194      ( 30 Jan 2020 06:45 )             35.8     01-30    139  |  103  |  23  ----------------------------<  162<H>  4.6   |  26  |  0.64    Ca    8.5      30 Jan 2020 06:45  Phos  2.8     01-29  Mg     1.8     01-30            Culture - Blood (collected 28 Jan 2020 16:35)  Source: BLOOD VENOUS  Preliminary Report (30 Jan 2020 16:35):    NO ORGANISMS ISOLATED    NO ORGANISMS ISOLATED AT 48 HRS.    Culture - Blood (collected 28 Jan 2020 16:35)  Source: BLOOD PERIPHERAL  Preliminary Report (30 Jan 2020 16:34):    NO ORGANISMS ISOLATED    NO ORGANISMS ISOLATED AT 48 HRS.

## 2020-01-30 NOTE — PROGRESS NOTE ADULT - PROBLEM SELECTOR PLAN 3
- patient reports no symptoms, but info unreliable due to dementia  - resume ceftriaxone  - UCx NGTD
- patient reports no symptoms, but info unreliable due to dementia  - resume ceftriaxone  - UCx NGTD
- patient reports no symptoms, but info unreliable due to dementia  - resume ceftriaxone  - f/u urine culture
- patient reports no symptoms, but info unreliable due to dementia  - resume ceftriaxone  - f/u urine culture
amlodipine,  held  cont metoprolol 25 BID  cont lisinopril
likely due to her BP regimen, possibly contributed to her initial presenting symptoms  Amlodipine was d/c'd  Lisinopril decreased to 10mg  restarting metoprolol at 12.5mg bid as above.
likely due to her BP regimen, possibly contributed to her initial presenting symptoms  Amlodipine was d/c'd  Lisinopril decreased to 10mg  restarting metoprolol at 12.5mg bid as above.
amlodipine, held  cont metoprolol 25 BID  cont lisinopril

## 2020-01-30 NOTE — PROGRESS NOTE ADULT - PROBLEM SELECTOR PROBLEM 7
Uncontrolled hypertension
Discharge planning issues
Uncontrolled hypertension

## 2020-01-30 NOTE — PROGRESS NOTE ADULT - ATTENDING COMMENTS
eventual rehab
updated daughter, Maria De Jesus; asked her to update her sister  MRI result discussed and ID kathrynal; if no further w/u needed will plan for dc to rehab starting tomorrow
eventual rehab
eventual rehab

## 2020-01-30 NOTE — PROGRESS NOTE ADULT - PROBLEM SELECTOR PROBLEM 3
Acute cystitis without hematuria
Hypotension due to drugs

## 2020-01-30 NOTE — PROGRESS NOTE ADULT - PROBLEM SELECTOR PROBLEM 6
Discharge planning issues
Medication management
Uncontrolled type 2 diabetes mellitus with hyperglycemia

## 2020-01-30 NOTE — PROGRESS NOTE ADULT - PROBLEM SELECTOR PROBLEM 5
Acute cystitis without hematuria
Uncontrolled hypertension
Acute cystitis without hematuria

## 2020-01-30 NOTE — PROGRESS NOTE ADULT - PROBLEM SELECTOR PROBLEM 1
Carotid artery dissection

## 2020-01-30 NOTE — PROGRESS NOTE ADULT - ASSESSMENT
89 year old female, with past history significant for HTN, CAD s/p Stent, Type-II DM, and HLD, presented to the ED secondary to dizziness, pt found to have R carotid artery dissection. no intervention per neurosurgery. medical management per neuro and neurosurgery. plan to discharge to [facility].

## 2020-01-30 NOTE — PROGRESS NOTE ADULT - PROBLEM SELECTOR PLAN 9
WBC nl  afeb, non toxic, no localizing sx  if bcx remain neg, can dc to rehab tomorrow
WBC now again WNL  blood cx sent yesterday as recommended by ID due to rising WBC yesterday, now resolved  if bcx neg, can dc to rehab tomorrow
found on MRI  apprec ID eval; no treatment necessary  pt with elevated WBC this AM and reporting clogged ears, will d/w ID for further recs

## 2020-01-30 NOTE — PROGRESS NOTE ADULT - PROBLEM SELECTOR PLAN 8
1.  PCP Name                   = Fransisco Sanford cardiologist  2.  PCP Contacted at Admission =  [x] Y       [  ] N          [  ] N/A  3.  PCP Contacted at Discharge  =  [  ] Y       [  ] N          [  ] N/A  4.  Post-Discharge Appointment Date and Location =   5.  Summary of Handoff Given to PCP                    =  awmarialuisa bcx, if neg can dc to rehab
1.  PCP Name                   = Fransisco Sanford cardiologist  2.  PCP Contacted at Admission =  [x] Y       [  ] N          [  ] N/A  3.  PCP Contacted at Discharge  =  [  ] Y       [  ] N          [  ] N/A  4.  Post-Discharge Appointment Date and Location =   5.  Summary of Handoff Given to PCP                    =
1.  PCP Name                   = Fransisco Sanford cardiologist  2.  PCP Contacted at Admission =  [x] Y       [  ] N          [  ] N/A  3.  PCP Contacted at Discharge  =  [  ] Y       [  ] N          [  ] N/A  4.  Post-Discharge Appointment Date and Location =   5.  Summary of Handoff Given to PCP                    =
1.  PCP Name                   = Fransisco Sanford cardiologist  2.  PCP Contacted at Admission =  [x] Y       [  ] N          [  ] N/A  3.  PCP Contacted at Discharge  =  [  ] Y       [  ] N          [  ] N/A  4.  Post-Discharge Appointment Date and Location =   5.  Summary of Handoff Given to PCP                    =  await cards eval for NSVT and ID f/u
1.  PCP Name                   = Fransisco Sanford cardiologist  2.  PCP Contacted at Admission =  [x] Y       [  ] N          [  ] N/A  3.  PCP Contacted at Discharge  =  [  ] Y       [  ] N          [  ] N/A  4.  Post-Discharge Appointment Date and Location =   5.  Summary of Handoff Given to PCP                    =  awmarialuisa bcx, if neg can dc to rehab
1.  PCP Name                   = Fransisco Sanford cardiologist  2.  PCP Contacted at Admission =  [x] Y       [  ] N          [  ] N/A  3.  PCP Contacted at Discharge  =  [  ] Y       [  ] N          [  ] N/A  4.  Post-Discharge Appointment Date and Location =   5.  Summary of Handoff Given to PCP                    =  planning for rehab post dc

## 2020-01-30 NOTE — PROGRESS NOTE ADULT - SUBJECTIVE AND OBJECTIVE BOX
Jimbo Trevino MD  Interventional Cardiology / Advance Heart Failure and Cardiac Transplant Specialist  [place] Office : [address] [place] [address] [address]. [address]  Tel:   [place] Office : [address] [facility] [place] [address]  Tel: [phone]  Cell : [phone]    Pt is lying in bed comfortable not in distress, no chest pains no SOB no palpitations  	  MEDICATIONS:  aspirin enteric coated 81 milliGRAM(s) Oral daily  clopidogrel Tablet 75 milliGRAM(s) Oral daily  heparin  Injectable 5000 Unit(s) SubCutaneous every 8 hours  lisinopril 10 milliGRAM(s) Oral daily  metoprolol tartrate 25 milliGRAM(s) Oral two times a day        gabapentin 300 milliGRAM(s) Oral two times a day  memantine 10 milliGRAM(s) Oral daily      atorvastatin 10 milliGRAM(s) Oral at bedtime  dextrose 40% Gel 15 Gram(s) Oral once PRN  dextrose 50% Injectable 12.5 Gram(s) IV Push once  dextrose 50% Injectable 25 Gram(s) IV Push once  dextrose 50% Injectable 25 Gram(s) IV Push once  glucagon  Injectable 1 milliGRAM(s) IntraMuscular once PRN  insulin lispro (HumaLOG) corrective regimen sliding scale   SubCutaneous three times a day before meals  insulin lispro (HumaLOG) corrective regimen sliding scale   SubCutaneous at bedtime    dextrose 5%. 1000 milliLiter(s) IV Continuous <Continuous>  petrolatum white Ointment 1 Application(s) Topical three times a day  timolol 0.5% Solution 1 Drop(s) Both EYES two times a day      PAST MEDICAL/SURGICAL HISTORY  PAST MEDICAL & SURGICAL HISTORY:  Glaucoma  Dementia  CAD (coronary artery disease)  DM (diabetes mellitus)  HLD (hyperlipidemia)  HTN (hypertension)  S/P hip replacement, bilateral  S/P coronary artery stent placement      SOCIAL HISTORY: Substance Use (street drugs): ( x ) never used  (  ) other:    FAMILY HISTORY:  Family history of hypertension: ~ sister and brother      PHYSICAL EXAM:  T(C): 36.2 (01-30-20 @ 13:04), Max: 36.9 (01-30-20 @ 05:48)  HR: 62 (01-30-20 @ 16:51) (56 - 63)  BP: 144/75 (01-30-20 @ 16:51) (118/67 - 144/75)  RR: 17 (01-30-20 @ 13:04) (16 - 17)  SpO2: 96% (01-30-20 @ 13:04) (96% - 100%)  Wt(kg): --  I&O's Summary    29 Jan 2020 07:01  -  30 Jan 2020 07:00  --------------------------------------------------------  IN: 400 mL / OUT: 600 mL / NET: -200 mL    30 Jan 2020 07:01  -  30 Jan 2020 21:07  --------------------------------------------------------  IN: 480 mL / OUT: 0 mL / NET: 480 mL          EYES: EOMI, PERRLA, conjunctiva and sclera clear  ENMT: No tonsillar erythema, exudates, or enlargement; Moist mucous membranes, Good dentition, No lesions  Cardiovascular: Normal S1 S2, No JVD, 2/6 ejection systolic murmurs, No edema  Respiratory: b/l rhonchi  Gastrointestinal:  Soft, Non-tender, + BS	  Extremities: Normal range of motion, No clubbing, cyanosis or edema                                      11.6   6.83  )-----------( 194      ( 30 Jan 2020 06:45 )             35.8     01-30    139  |  103  |  23  ----------------------------<  162<H>  4.6   |  26  |  0.64    Ca    8.5      30 Jan 2020 06:45  Phos  2.8     01-29  Mg     1.8     01-30      proBNP:   Lipid Profile:   HgA1c:   TSH:     Consultant(s) Notes Reviewed:  [x ] YES  [ ] NO    Care Discussed with Consultants/Other Providers [ x] YES  [ ] NO    Imaging Personally Reviewed independently:  [x] YES  [ ] NO    All labs, radiologic studies, vitals, orders and medications list reviewed. Patient is seen and examined at bedside. Case discussed with medical team.

## 2020-01-30 NOTE — PROGRESS NOTE ADULT - PROBLEM SELECTOR PLAN 4
- ECG = NSR at 66 bpm, RBBB, LAD  - New T-wave inversions in the chest leads  - QTc = 478  - last TTE in 10/2018 w/ severe aortic stenosis  - Team discussed with her outpatient cardiologist, outpt TTE and EKG in chart
- glucose = 237 on CMP  - Hba1c 7.6  - consistent carb diet  - ISS per FS  - Pt on Metformin and Tradjenta at home
- ECG = NSR at 66 bpm, RBBB, LAD  - New T-wave inversions in the chest leads  - QTc = 478  - last TTE in 10/2018 w/ severe aortic stenosis  - Team discussed with her outpatient cardiologist, outpt TTE and EKG in chart

## 2020-01-30 NOTE — PROGRESS NOTE ADULT - PROBLEM SELECTOR PLAN 6
- glucose = 237 on CMP  - Hba1c 7.6  - consistent carb diet  - ISS per FS  - Pt on Metformin and Tradjenta at home
- Medication reconciliation completed
- glucose = 237 on CMP  - Hba1c 7.6  - consistent carb diet  - ISS per FS  - Pt on Metformin and Tradjenta at home
1.  PCP Name                   = Fransisco Sanford cardiologist  2.  PCP Contacted at Admission =  [x] Y       [  ] N          [  ] N/A  3.  PCP Contacted at Discharge  =  [  ] Y       [  ] N          [  ] N/A  4.  Post-Discharge Appointment Date and Location =   5.  Summary of Handoff Given to PCP                    =    Care discussed with daughter Michelle Sargent

## 2020-01-30 NOTE — PROGRESS NOTE ADULT - PROBLEM SELECTOR PROBLEM 4
ECG abnormality
Uncontrolled type 2 diabetes mellitus with hyperglycemia
ECG abnormality

## 2020-01-30 NOTE — PROGRESS NOTE ADULT - PROBLEM SELECTOR PLAN 5
- BP to 200/99 max  - on lisinopril 20 mg and amlodipine 5 mg at last admission  - slight hypotensive this morning and patient was more fatigue.   - c/w lisinopril 20mg, d/c amlodipine.   - monitor BP.
- BP to 200/99 max  - on lisinopril 20 mg and amlodipine 5 mg at last admission; continuing same
- patient reports no symptoms, but info unreliable due to dementia  - resume ceftriaxone  - UCx NGTD
- patient reports no symptoms, but info unreliable due to dementia  - resume ceftriaxone  - UCx NGTD
completed 3 days ctx
completed 5 days ctx

## 2020-01-31 ENCOUNTER — TRANSCRIPTION ENCOUNTER (OUTPATIENT)
Age: 85
End: 2020-01-31

## 2020-01-31 VITALS — DIASTOLIC BLOOD PRESSURE: 64 MMHG | SYSTOLIC BLOOD PRESSURE: 110 MMHG

## 2020-01-31 DIAGNOSIS — J32.9 CHRONIC SINUSITIS, UNSPECIFIED: ICD-10-CM

## 2020-01-31 LAB
GLUCOSE BLDC GLUCOMTR-MCNC: 180 MG/DL — HIGH (ref 70–99)
GLUCOSE BLDC GLUCOMTR-MCNC: 289 MG/DL — HIGH (ref 70–99)

## 2020-01-31 PROCEDURE — 99239 HOSP IP/OBS DSCHRG MGMT >30: CPT

## 2020-01-31 RX ORDER — GABAPENTIN 400 MG/1
1 CAPSULE ORAL
Qty: 40 | Refills: 0

## 2020-01-31 RX ORDER — LISINOPRIL 2.5 MG/1
1 TABLET ORAL
Qty: 20 | Refills: 0

## 2020-01-31 RX ORDER — MEMANTINE HYDROCHLORIDE 10 MG/1
1 TABLET ORAL
Qty: 0 | Refills: 0 | DISCHARGE
Start: 2020-01-31

## 2020-01-31 RX ORDER — CLOPIDOGREL BISULFATE 75 MG/1
1 TABLET, FILM COATED ORAL
Qty: 0 | Refills: 0 | DISCHARGE
Start: 2020-01-31

## 2020-01-31 RX ORDER — METOPROLOL TARTRATE 50 MG
1 TABLET ORAL
Qty: 0 | Refills: 0 | DISCHARGE

## 2020-01-31 RX ORDER — GABAPENTIN 400 MG/1
1 CAPSULE ORAL
Qty: 0 | Refills: 0 | DISCHARGE
Start: 2020-01-31

## 2020-01-31 RX ORDER — ASPIRIN/CALCIUM CARB/MAGNESIUM 324 MG
1 TABLET ORAL
Qty: 0 | Refills: 0 | DISCHARGE
Start: 2020-01-31

## 2020-01-31 RX ORDER — LISINOPRIL 2.5 MG/1
1 TABLET ORAL
Qty: 0 | Refills: 0 | DISCHARGE
Start: 2020-01-31

## 2020-01-31 RX ORDER — METOPROLOL TARTRATE 50 MG
1 TABLET ORAL
Qty: 0 | Refills: 0 | DISCHARGE
Start: 2020-01-31

## 2020-01-31 RX ORDER — MEMANTINE HYDROCHLORIDE 10 MG/1
1 TABLET ORAL
Qty: 0 | Refills: 0 | DISCHARGE

## 2020-01-31 RX ORDER — CLOPIDOGREL BISULFATE 75 MG/1
1 TABLET, FILM COATED ORAL
Qty: 0 | Refills: 0 | DISCHARGE

## 2020-01-31 RX ORDER — PETROLATUM,WHITE
1 JELLY (GRAM) TOPICAL
Qty: 0 | Refills: 0 | DISCHARGE
Start: 2020-01-31

## 2020-01-31 RX ORDER — TIMOLOL 0.5 %
1 DROPS OPHTHALMIC (EYE)
Qty: 0 | Refills: 0 | DISCHARGE
Start: 2020-01-31

## 2020-01-31 RX ADMIN — HEPARIN SODIUM 5000 UNIT(S): 5000 INJECTION INTRAVENOUS; SUBCUTANEOUS at 13:14

## 2020-01-31 RX ADMIN — Medication 6: at 12:45

## 2020-01-31 RX ADMIN — HEPARIN SODIUM 5000 UNIT(S): 5000 INJECTION INTRAVENOUS; SUBCUTANEOUS at 05:15

## 2020-01-31 RX ADMIN — Medication 81 MILLIGRAM(S): at 12:44

## 2020-01-31 RX ADMIN — Medication 1 DROP(S): at 05:15

## 2020-01-31 RX ADMIN — Medication 1 APPLICATION(S): at 05:15

## 2020-01-31 RX ADMIN — GABAPENTIN 300 MILLIGRAM(S): 400 CAPSULE ORAL at 05:15

## 2020-01-31 RX ADMIN — Medication 2: at 08:47

## 2020-01-31 RX ADMIN — CLOPIDOGREL BISULFATE 75 MILLIGRAM(S): 75 TABLET, FILM COATED ORAL at 12:44

## 2020-01-31 RX ADMIN — MEMANTINE HYDROCHLORIDE 10 MILLIGRAM(S): 10 TABLET ORAL at 12:44

## 2020-01-31 NOTE — DISCHARGE NOTE PROVIDER - NSDCCPCAREPLAN_GEN_ALL_CORE_FT
PRINCIPAL DISCHARGE DIAGNOSIS  Diagnosis: Carotid artery dissection  Assessment and Plan of Treatment: for conservative management . Evaluated by neurology and neurosurgery during admission

## 2020-01-31 NOTE — DISCHARGE NOTE PROVIDER - CARE PROVIDER_API CALL
Fransisco Sanford)  Cardiology; Internal Medicine  [address] [place] [address]  Phone: [phone]  Fax: [phone]  Follow Up Time:

## 2020-01-31 NOTE — PROGRESS NOTE ADULT - PROVIDER SPECIALTY LIST ADULT
Cardiology
Hospitalist
Infectious Disease
Infectious Disease
Hospitalist

## 2020-01-31 NOTE — DISCHARGE NOTE PROVIDER - NSDCMRMEDTOKEN_GEN_ALL_CORE_FT
aspirin 81 mg oral delayed release tablet: 1 tab(s) orally once a day  clopidogrel 75 mg oral tablet: 1 tab(s) orally once a day  Crestor 5 mg oral tablet: 1 tab(s) orally once a day  gabapentin 300 mg oral capsule: 1 cap(s) orally 2 times a day  lisinopril 10 mg oral tablet: 1 tab(s) orally once a day  memantine 10 mg oral tablet: 1 tab(s) orally once a day  metFORMIN 500 mg oral tablet: 1 tab(s) orally once a day  metoprolol tartrate 25 mg oral tablet: 1 tab(s) orally 2 times a day  petrolatum topical ointment: 1 application topically 3 times a day  timolol maleate 0.5% ophthalmic solution: 1 drop(s) to each affected eye 2 times a day  Tradjenta 5 mg oral tablet: 1 tab(s) orally once a day

## 2020-01-31 NOTE — DISCHARGE NOTE NURSING/CASE MANAGEMENT/SOCIAL WORK - PATIENT PORTAL LINK FT
You can access the FollowMyHealth Patient Portal offered by [facility] by registering at the following website: [email or web address]. By joining [employer]’s FollowMyHealth portal, you will also be able to view your health information using other applications (apps) compatible with our system.

## 2020-01-31 NOTE — CHART NOTE - NSCHARTNOTEFT_GEN_A_CORE
pt planned for dc   per aide, pt ate well this AM and is now sleeping  VSS  CHEST non labored  a/w dizziness  found with NSVT, which is long standing intermittent, family refusing TAVR  leukocytosis resolved  bcx x 2 neg x 48 hours  medically stable for dc to rehab  32 min spent with dc planning

## 2020-01-31 NOTE — PROGRESS NOTE ADULT - REASON FOR ADMISSION
Carotid artery dissection

## 2020-01-31 NOTE — DISCHARGE NOTE PROVIDER - HOSPITAL COURSE
89 year old female, with past history significant for HTN, CAD s/p Stent, Type-II DM, and HLD, presented to the ED secondary to dizziness, pt found to have R carotid artery dissection. no intervention per neurosurgery. medical management per neuro and neurosurgery. plan to discharge to [facility].         Carotid artery dissection.      Plan: - sudden onset of dizziness and headache while using the commode    - CT scan demonstrative of "A short segment dissection involves the distal right common carotid artery with extension to the proximal internal and external carotid arteries. There is no associated significant vascular narrowing."    - already seen by Neuro-surg and Neurology teams (appreciated), recommendation for aspirin, no need for DAPT per neuro for their standpoint. On plavix ffor hx of Stent             : NSVT (nonsustained ventricular tachycardia).      no further events     will dc tele    family wishes for conservative management. - continue with Bblocker          Hypotension due to drugs.      Plan: amlodipine, held    cont metoprolol 25 BID    cont lisinopril.         : ECG abnormality.     : - ECG = NSR at 66 bpm, RBBB, LAD    - New T-wave inversions in the chest leads    - QTc = 478    - last TTE in 10/2018 w/ severe aortic stenosis    -  outpt TTE and EKG in chart.     Evaluated by Cardiology Dr. Trevino -     SVT vs NSVT ,RBBB at baseline pt asymptomatic c/w metoprolol 25 mg po BID Found to have Carotid artery ds,  paradoxical low flow low gradient sever AS. Discussed with daughter Maria De Jesus. aware of AS , discussed with Dr. Balderas last year (op cardiologist) Discussed TAVR option again,  family wants conservative management         Acute cystitis without hematuria.      Plan: completed 5 days ctx.              Uncontrolled type 2 diabetes mellitus with hyperglycemia.     Plan: - glucose = 237 on CMP    - Hba1c 7.6, - consistent carb diet,- ISS per FS    - Pt on Metformin and Tradjenta at home.             Uncontrolled hypertension.      Plan: BP better.     Follow up with primary medical doctor / cardiology following discharge from rehab = Fransisco Sanford cardiologist        await bcx, if neg can dc to rehab.         Problem/Plan - 9:    ·  Problem: Chronic sinusitis, unspecified location.  Plan: WBC nl    afeb, non toxic, no localizing sx    if bcx remain neg, can dc to rehab tomorrow.

## 2020-01-31 NOTE — PROGRESS NOTE ADULT - SUBJECTIVE AND OBJECTIVE BOX
Jimbo Trevino MD  Interventional Cardiology / Advance Heart Failure and Cardiac Transplant Specialist  [place] Office : [address] [place] [address] [address] [address]  Tel:   [place] Office : [address] [facility] [place] [address]  Tel: [phone]  Cell : [phone]    Pt is lying in bed comfortable not in distress, no chest pains no SOB no palpitations  	  MEDICATIONS:  aspirin enteric coated 81 milliGRAM(s) Oral daily  clopidogrel Tablet 75 milliGRAM(s) Oral daily  heparin  Injectable 5000 Unit(s) SubCutaneous every 8 hours  lisinopril 10 milliGRAM(s) Oral daily  metoprolol tartrate 25 milliGRAM(s) Oral two times a day  gabapentin 300 milliGRAM(s) Oral two times a day  memantine 10 milliGRAM(s) Oral daily  atorvastatin 10 milliGRAM(s) Oral at bedtime  dextrose 40% Gel 15 Gram(s) Oral once PRN  dextrose 50% Injectable 12.5 Gram(s) IV Push once  dextrose 50% Injectable 25 Gram(s) IV Push once  dextrose 50% Injectable 25 Gram(s) IV Push once  glucagon  Injectable 1 milliGRAM(s) IntraMuscular once PRN  insulin lispro (HumaLOG) corrective regimen sliding scale   SubCutaneous three times a day before meals  insulin lispro (HumaLOG) corrective regimen sliding scale   SubCutaneous at bedtime  dextrose 5%. 1000 milliLiter(s) IV Continuous <Continuous>  petrolatum white Ointment 1 Application(s) Topical three times a day  timolol 0.5% Solution 1 Drop(s) Both EYES two times a day    PAST MEDICAL/SURGICAL HISTORY  PAST MEDICAL & SURGICAL HISTORY:  Glaucoma  Dementia  CAD (coronary artery disease)  DM (diabetes mellitus)  HLD (hyperlipidemia)  HTN (hypertension)  S/P hip replacement, bilateral  S/P coronary artery stent placement      SOCIAL HISTORY: Substance Use (street drugs): ( x ) never used  (  ) other:    FAMILY HISTORY:  Family history of hypertension: ~ sister and brother        PHYSICAL EXAM:  T(C): 36.3 (01-31-20 @ 05:13), Max: 36.4 (01-30-20 @ 21:39)  HR: 56 (01-31-20 @ 05:13) (56 - 62)  BP: 108/59 (01-31-20 @ 05:13) (108/59 - 144/75)  RR: 16 (01-31-20 @ 05:13) (16 - 17)  SpO2: 99% (01-31-20 @ 05:13) (96% - 100%)  Wt(kg): --  I&O's Summary    30 Jan 2020 07:01  -  31 Jan 2020 07:00  --------------------------------------------------------  IN: 880 mL / OUT: 750 mL / NET: 130 mL            EYES: EOMI, PERRLA, conjunctiva and sclera clear  ENMT: No tonsillar erythema, exudates, or enlargement; Moist mucous membranes, Good dentition, No lesions  Cardiovascular: Normal S1 S2, No JVD, 2/6 systolic murmurs, No edema  Respiratory: Lungs clear to auscultation	  Gastrointestinal:  Soft, Non-tender, + BS	  Extremities: Normal range of motion, No clubbing, cyanosis or edema                                      11.6   6.83  )-----------( 194      ( 30 Jan 2020 06:45 )             35.8     01-30    139  |  103  |  23  ----------------------------<  162<H>  4.6   |  26  |  0.64    Ca    8.5      30 Jan 2020 06:45  Mg     1.8     01-30      proBNP:   Lipid Profile:   HgA1c:   TSH:     Consultant(s) Notes Reviewed:  [x ] YES  [ ] NO    Care Discussed with Consultants/Other Providers [ x] YES  [ ] NO    Imaging Personally Reviewed independently:  [x] YES  [ ] NO    All labs, radiologic studies, vitals, orders and medications list reviewed. Patient is seen and examined at bedside. Case discussed with medical team.

## 2020-02-02 LAB
BACTERIA BLD CULT: SIGNIFICANT CHANGE UP
BACTERIA BLD CULT: SIGNIFICANT CHANGE UP

## 2020-02-09 ENCOUNTER — OUTPATIENT (OUTPATIENT)
Dept: OUTPATIENT SERVICES | Facility: HOSPITAL | Age: 85
LOS: 1 days | Discharge: HOME | End: 2020-02-09
Payer: MEDICARE

## 2020-02-09 DIAGNOSIS — Z96.643 PRESENCE OF ARTIFICIAL HIP JOINT, BILATERAL: Chronic | ICD-10-CM

## 2020-02-09 DIAGNOSIS — R94.39 ABNORMAL RESULT OF OTHER CARDIOVASCULAR FUNCTION STUDY: ICD-10-CM

## 2020-02-09 DIAGNOSIS — Z95.5 PRESENCE OF CORONARY ANGIOPLASTY IMPLANT AND GRAFT: Chronic | ICD-10-CM

## 2020-02-10 PROCEDURE — 93925 LOWER EXTREMITY STUDY: CPT | Mod: 26

## 2020-03-04 NOTE — CONSULT NOTE ADULT - CONSULT REQUESTED DATE/TIME
Left message for pt to call back about lab results.    ----- Message from Silvio Jimenez MD sent at 3/3/2020  8:39 PM CST -----  These results are normal. Please notify the patient.     17-Oct-2018

## 2020-11-24 NOTE — CONSULT NOTE ADULT - PROBLEM SELECTOR RECOMMENDATION 3
H&P reviewed. The patient was examined and there are no changes to the H&P.   /90 (BP Location: Left arm, Patient Position: Lying)   Pulse 87   Temp 98.1 °F (36.7 °C) (Oral)   Resp 17   Wt 84.6 kg (186 lb 6.4 oz)   SpO2 95%   BMI 33.02 kg/m²   Procedure reviewed and all questions answered.   Queta Brandt MD    
PT with proteinuria on UA  UA suggestive, of UTI however pt asymptomatic  Check repeat UA and urine TP/Cr

## 2021-05-01 NOTE — PROGRESS NOTE ADULT - PROBLEM SELECTOR PROBLEM 8
Need for prophylactic measure
Need for prophylactic measure
fall
Need for prophylactic measure

## 2021-05-21 NOTE — ED ADULT NURSE NOTE - NS ED NURSE DC INFO COMPLEXITY
Simple: Patient demonstrates quick and easy understanding/Patient asked questions
full range of motion in all extremities

## 2021-07-09 NOTE — H&P ADULT - PROBLEM SELECTOR PLAN 5
I have reviewed the surgical (or preoperative) H&P that is linked to this encounter, and examined the patient. There are no significant changes   Continue statin  Check FLP  DASH diet

## 2021-07-23 NOTE — PHYSICAL THERAPY INITIAL EVALUATION ADULT - STANDING BALANCE: DYNAMIC, REHAB EVAL
July 23, 2021       Jimmy Herman, DO  [address] [facility] [address]  Via Fax: [phone]      Patient: Dno Luciano   YOB: 2007   Date of Visit: 7/23/2021       Dear Dr. Herman:    Thank you for referring Don Luciano to me for evaluation. Below are my notes for this visit with him.    If you have questions, please do not hesitate to call me. I look forward to following your patient along with you.      Sincerely,        Little Farnsworth MD        CC: No Recipients  Little Farnsworth MD  7/23/2021 12:40 PM  Signed  Consultation requested by PMD, please see my evaluation below    14 yo male here for evaluation of leg shaking. Since April, right leg began shaking. In May occurred again, 3 hours. Mother held on to the leg and then the other leg started shaking. Was seen in the ER, was told possible anxiety attack. Likes to be alone and does not like to be around many people. Both times this occurred when around a crowd of people. In June had 3 episodes. Mother noticed that it's when he becomes nervous or around a crowd. Despite the leg shaking he is able to walk. Used to be in PT, not able to ride a bike.     PMhx: ADHD    FHx: mother with depression and anxiety, 1st cousin with seizures    Review of Systems   Constitutional: Negative.    HENT: Negative.    Eyes: Negative.    Respiratory: Negative.    Cardiovascular: Negative.    Gastrointestinal: Negative.    Endocrine: Negative.    Genitourinary: Negative.    Musculoskeletal: Negative.    Skin: Negative.    Allergic/Immunologic: Negative.    Hematological: Negative.     General: No acute distress.   HEENT: NCAT. Mucus membranes moist. No dysmorphic features  Resp: No flaring/retractions.  CV: Normal perfusion, no edema.  GI: Soft, nontender, no masses.  Skin: No neurocutaneous markings.  MS: Normal range of motion. No contractures.  Psych: Appropriate affect.     Neurological Exam:  Mental Status:     -Awake, alert, and oriented.    -Normal speech  and language. Follows complex commands.      Cranial Nerves:    -I: deferred.    -II: VF intact to finger counting. Optic disks sharp with clear margins, no pallor or       hemorrhages bilaterally.    -III, IV, VI: EOMI. PERRL.    -V: Sensation intact in V1-V3 bilaterally.    -VII: Face/smile symmetric.     -VIII: Hearing intact to finger rub bilaterally.    -IX, X: Palate and uvula midline.     -XI: SCM/trapezius intact bilaterally.    -XII: Tongue midline, no fasciculations.  Motor:     -Normal muscle tone and bulk throughout.     -Strength 5/5 in bilateral deltoids, triceps, biceps, interossei, psoas, quadriceps, TA, gastrocnemius.    Sensation:     -Intact to light touch    DTRs:     -UE: 2/4 in triceps, biceps, brachioradialis.    -LE: 2/4 in patella, Achilles. No clonus.     -Toes downgoing bilaterally.    Coordination and Gait:     -FNF intact carmenza. HSK intact carmenza. DANIEL intact bilaterally.     -Normal narrow-based gait. Normal heel and toe walking. Tandem gait intact.     -Romberg negative.     Impression: 14 yo male with leg shaking likely secondary to anxiety as always when he's nervous or in a big crowd which makes him nervous. Seizure not suspected as when mother tried to suppress it, it stopped and the other leg started shaking. If seizure for 3 hours would expect fall during and Anival's paralysis afterward. Also seizures cannot be suppressed and would occur randomly, not only when he is nervous. Mother does report that he has anxiety. She also has anxiety and was shaking her legs in a similar fashion during our evaluation.     Recs:  -offered EEG, discussed low yield, mother appropriately deferred at this time  -referral to psychologist  -f/u if worsening symptoms or other concerns                fair minus

## 2021-08-11 NOTE — ED PROVIDER NOTE - VASCULAR COMPROMISE
Patient reports he is still having diarrhea, bloating, constipation, and nausea.  He reports he has only been taking Xifaxan twice a day and not three times per day.  Patient states he only has \"about 8 pills left\" and is wondering about next steps because \"this medication isn't helping\".  RN advised that he should take the remaining 8 doses 3x/day going forward and message will be forwarded to NP to advise.  E-advice message will be sent to patient with recommendations.  He verbalized understanding and offered no further questions.    no vascular compromise

## 2021-10-09 NOTE — ED ADULT NURSE NOTE - IN THE PAST 12 MONTHS HAVE YOU USED DRUGS OTHER THAN THOSE REQUIRED FOR MEDICAL REASON?
Surgical Consultation    Date: 10/8/2021    Requesting Physician: Dr. Ritchie  PCP: Pratik Gordon M.D.  Attending Physician: Norris Figueroa M.D.    CC: Abdominal pain    HPI: This is a 66 y.o. female with a history of recurrent pancreatitis and biliary ductal dilatation for which she underwent ERCP approximately 1 week ago.  During this procedure, she underwent biliary sphincterotomy, biopsy, bile duct and ampulla dilatation.  She reports waxing and waning abdominal pain that ultimately led her to come to the emergency department earlier today.  She has had nausea but no vomiting.  She was found to have leukocytosis, very mild lactic acidosis which then improved, and hyponatremia.  Bicarbonate level was normal.  CT was performed showing an irregular fluid collection in the right abdomen surrounding the right kidney and right colon.  There is also some evidence of a small amount of gas in the midline anterior to the pancreas which was contiguous with the right sided abdominal fluid collection.  She was hemodynamically stable and was admitted.  Interventional radiology was contacted and was able to place a percutaneous drain in the right side of fluid collection which is draining by gravity.  It appears bilious.  The patient says her pain has improved significantly since the drain was placed.  She feels better overall.  No fever, chills, chest pain, shortness of breath, hematemesis, hemoptysis, neurologic changes.      Past Medical History:   Diagnosis Date   • Allergy, unspecified not elsewhere classified    • Anemia     not currently   • Anesthesia     PONV (Demerol/ Dilaudid)   • Arthritis     bilateral shoulders,sacrum, osteo   • Backpain     coccyx and sacrum   • Bronchitis 2010   • Cataract     bilateral IOLI   • Degeneration of cervical intervertebral disc     C5-6,C6-7   • Dental disorder     partial upper and lower   • Heart burn    • Hiatus hernia syndrome     not a problem after gastric sleeve   • High  cholesterol     resolved after gastric surgery   • Hyperlipidemia    • Hypertension     off all medication, reveresed after gastric sleeve   • Muscle disorder    • Pain 05/16/2018    low back and SI joints and sacrum   • Reactive airway disease     rescue inhaler not needed unless with bronchitis       Past Surgical History:   Procedure Laterality Date   • PB ERCP,DIAGNOSTIC  10/1/2021    Procedure: ERCP (ENDOSCOPIC RETROGRADE CHOLANGIOPANCREATOGRAPHY);  Surgeon: Fausto Casas M.D.;  Location: [facility];  Service: Gastroenterology   • COLONOSCOPY  8/8/2018    Procedure: COLONOSCOPY;  Surgeon: Shukri Condon M.D.;  Location: [facility];  Service: General   • GASTROSCOPY  5/23/2018    Procedure: GASTROSCOPY;  Surgeon: Fausto Casas M.D.;  Location: [facility];  Service: Gastroenterology   • EGD W/ENDOSCOPIC ULTRASOUND  5/23/2018    Procedure: EGD W/ENDOSCOPIC ULTRASOUND- RADIAL UPPER;  Surgeon: Fausto Casas M.D.;  Location: [facility];  Service: Gastroenterology   • CATARACT PHACO WITH IOL Left 4/18/2017    Procedure: CATARACT PHACO WITH IOL;  Surgeon: Stuart Estevez M.D.;  Location: SURGERY SAME DAY [facility];  Service:    • CATARACT PHACO WITH IOL Right 4/4/2017    Procedure: CATARACT PHACO WITH IOL;  Surgeon: Stuart Estevez M.D.;  Location: SURGERY [facility];  Service:    • GASTRIC SLEEVE LAPAROSCOPY  10/19/2016    Procedure: GASTRIC SLEEVE LAPAROSCOPY, HIATAL HERNIA;  Surgeon: Shukri Condon M.D.;  Location: SURGERY [facility];  Service:    • LIVER BIOPSY LAPAROSCOPIC  10/19/2016    Procedure: LIVER BIOPSY LAPAROSCOPIC;  Surgeon: Shukri Condon M.D.;  Location: [facility];  Service:    • GASTROSCOPY N/A 8/26/2016    Procedure: GASTROSCOPY;  Surgeon: Shukri Condon M.D.;  Location: [facility];  Service:    • ROTATOR CUFF REPAIR Right 7/7/2016   • ROTATOR CUFF REPAIR Left 5/2015   •  HYSTERECTOMY ROBOTIC  1/2/2009    Performed by MEG VILLEGAS at SURGERY [facility] ORS   • LUMBAR FUSION ANTERIOR  2007    Dr Hillman, L3-S1 fusion   • CHOLECYSTECTOMY  1996    laparoscopic   • TUBAL LIGATION  1985   • GASTRIC RESECTION  1982    gastric stapling   • TONSILLECTOMY AND ADENOIDECTOMY  1967   • PRIMARY C SECTION  1976, 1979, 1985    x3       Current Facility-Administered Medications   Medication Dose Route Frequency Provider Last Rate Last Admin   • vitamin D3 (cholecalciferol) tablet 1,000 Units  1,000 Units Oral DAILY Orlando Jones M.D.       • cyclobenzaprine (Flexeril) tablet 10 mg  10 mg Oral HS PRN Orlando Jones M.D.       • ezetimibe (ZETIA) tablet 10 mg  10 mg Oral Q EVENING Orlando Jones M.D.       • gabapentin (NEURONTIN) capsule 600 mg  600 mg Oral BID Orlando Jones M.D.       • losartan (COZAAR) tablet 25 mg  25 mg Oral QAM Orlando Jones M.D.       • magnesium oxide (MAG-OX) tablet 400 mg  400 mg Oral Q EVENING Orlando Jones M.D.       • senna-docusate (PERICOLACE or SENOKOT S) 8.6-50 MG per tablet 2 Tablet  2 Tablet Oral BID Orlando Jones M.D.        And   • polyethylene glycol/lytes (MIRALAX) PACKET 1 Packet  1 Packet Oral QDAY PRN Orlando Jones M.D.        And   • magnesium hydroxide (MILK OF MAGNESIA) suspension 30 mL  30 mL Oral QDAY PRN Orlando Jones M.D.        And   • bisacodyl (DULCOLAX) suppository 10 mg  10 mg Rectal QDAY PRN Orlando Jones M.D.       • Respiratory Therapy Consult   Nebulization Continuous RT Orlando Jones M.D.       • lactated ringers infusion   Intravenous Continuous Orlando Jones M.D. 125 mL/hr at 10/08/21 1231 New Bag at 10/08/21 1231   • [START ON 10/9/2021] enoxaparin (LOVENOX) inj 40 mg  40 mg Subcutaneous DAILY Orlando Jones M.D.       • acetaminophen (Tylenol) tablet 650 mg  650 mg Oral Q6HRS PRN Orlando Jones M.D.       • Pharmacy Consult Request ...Pain Management Review 1 Each  1 Each Other  PHARMACY TO DOSE Orlando Jones M.D.       • oxyCODONE immediate-release (ROXICODONE) tablet 2.5 mg  2.5 mg Oral Q3HRS PRN Orlando Jones M.D.        Or   • oxyCODONE immediate-release (ROXICODONE) tablet 5 mg  5 mg Oral Q3HRS PRN Orlando Jones M.D.   5 mg at 10/08/21 1240    Or   • HYDROmorphone (Dilaudid) injection 0.25 mg  0.25 mg Intravenous Q3HRS PRN Orlando Jones M.D.   0.25 mg at 10/08/21 1207   • ondansetron (ZOFRAN) syringe/vial injection 4 mg  4 mg Intravenous Q4HRS PRN Orlando Jones M.D.   4 mg at 10/08/21 1240   • ondansetron (ZOFRAN ODT) dispertab 4 mg  4 mg Oral Q4HRS PRN Orlando Jones M.D.       • lactated ringers infusion (BOLUS)  500 mL Intravenous Once PRN Orlando Jones M.D.       • [START ON 10/9/2021] cefTRIAXone (Rocephin) 2 g in  mL IVPB  2 g Intravenous Q24HRS Orlando Jones M.D.       • famotidine (PEPCID) tablet 20 mg  20 mg Oral BID Orlando Jones M.D.        Or   • famotidine (PEPCID) injection 20 mg  20 mg Intravenous BID Orlando Jones M.D.       • metroNIDAZOLE (FLAGYL) tablet 500 mg  500 mg Oral Q6HRS Orlando Jones M.D.   500 mg at 10/08/21 1207   • NS (BOLUS) infusion 500 mL  500 mL Intravenous Once PRN Geovanni Grimes M.D.       • fentaNYL (SUBLIMAZE) injection 12.5-50 mcg  12.5-50 mcg Intravenous PRN Geovanni Grimes M.D.   25 mcg at 10/08/21 1637   • midazolam (VERSED) injection 0.5-2 mg  0.5-2 mg Intravenous PRN Geovanni Grimes M.D.   1 mg at 10/08/21 1637   • ondansetron (ZOFRAN) syringe/vial injection 4 mg  4 mg Intravenous PRN Geovanni Grimes M.D.   4 mg at 10/08/21 1616   • NALOXONE HCL 0.4 MG/ML INJ SOLN                Social History     Socioeconomic History   • Marital status:      Spouse name: Not on file   • Number of children: Not on file   • Years of education: Not on file   • Highest education level: Not on file   Occupational History   • Not on file   Tobacco Use   • Smoking status: Former Smoker      "Packs/day: 0.25     Years: 0.10     Pack years: 0.02     Types: Cigarettes     Quit date: 1973     Years since quittin.8   • Smokeless tobacco: Never Used   Vaping Use   • Vaping Use: Never used   Substance and Sexual Activity   • Alcohol use: No   • Drug use: No   • Sexual activity: Not on file     Comment:    Other Topics Concern   • Not on file   Social History Narrative   • Not on file     Social Determinants of Health     Financial Resource Strain:    • Difficulty of Paying Living Expenses:    Food Insecurity:    • Worried About Running Out of Food in the Last Year:    • Ran Out of Food in the Last Year:    Transportation Needs:    • Lack of Transportation (Medical):    • Lack of Transportation (Non-Medical):    Physical Activity:    • Days of Exercise per Week:    • Minutes of Exercise per Session:    Stress:    • Feeling of Stress :    Social Connections:    • Frequency of Communication with Friends and Family:    • Frequency of Social Gatherings with Friends and Family:    • Attends Manda[employer] Services:    • Active Member of Clubs or Organizations:    • Attends Club or Organization Meetings:    • Marital Status:    Intimate Partner Violence:    • Fear of Current or Ex-Partner:    • Emotionally Abused:    • Physically Abused:    • Sexually Abused:        Family History   Problem Relation Age of Onset   • Stroke Mother    • Cancer Father         larynx   • Diabetes Brother        Allergies:  Ampicillin, Cephalexin, Clindamycin, Codeine, Demerol, Levofloxacin, Tetracyclines, Tizanidine, Hydromorphone, Morphine, Pcn [penicillins], and Sulfa drugs    Review of Systems:  Negative except as noted above in the HPI and 10 point review    Physical Exam:  /62   Pulse 85   Temp 37.5 °C (99.5 °F) (Oral)   Resp 16   Ht 1.6 m (5' 3\")   Wt 64.9 kg (143 lb)   SpO2 94%     Constitutional: she is oriented to person, place, and time.  she appears well-developed, but mildly disheveled appearing. No " acute distress.   Head: Normocephalic and atraumatic.   Neck: Normal range of motion. Neck supple. No JVD present. No tracheal deviation present.  Cardiovascular: Normal rate, regular rhythm.  Pulmonary/Chest: Effort normal and breath sounds normal. No stridor. No respiratory distress. she has no wheezes.  Abdominal: Soft, nondistended.  Mild tenderness to palpation in the right abdomen around the drain site without guarding.  Drain in place with bilious appearing output in a gravity drain bag  Musculoskeletal: Normal range of motion. she exhibits no edema and no tenderness.   Neurological: she is alert and oriented to person, place, and time.  No gross deficits noted  Skin: Skin is warm and dry. No rash noted. she is not diaphoretic. No erythema.   Psychiatric: she has a normal mood and affect.  Behavior is reasonable under the circumstances.       Labs:  Recent Labs     10/08/21  0630   WBC 17.8*   RBC 4.34   HEMOGLOBIN 13.2   HEMATOCRIT 39.5   MCV 91.0   MCH 30.4   MCHC 33.4*   RDW 44.3   PLATELETCT 249   MPV 10.7     Recent Labs     10/08/21  0630   SODIUM 130*   POTASSIUM 4.3   CHLORIDE 89*   CO2 21   GLUCOSE 77   BUN 19   CREATININE 0.66   CALCIUM 9.2     Recent Labs     10/08/21  1401   INR 1.36*     Recent Labs     10/08/21  0630 10/08/21  1401   ASTSGOT 29  --    ALTSGPT 17  --    TBILIRUBIN 1.3  --    ALKPHOSPHAT 89  --    GLOBULIN 3.6*  --    INR  --  1.36*       Radiology:  CT-ABDOMEN-PELVIS WITH   Final Result         1. Large irregular irregular fluid collection with thick wall occupying most of the right abdomen surrounding the right kidney and right colon.      Additional fluid and gas collection in the midline abdomen anterior to the pancreas concerning for abscess. This collection is probably connected to the right side collection via a junction in the lj hepatis      2. Severe wall thickening of the descending colon, transverse colon, second portion duodenum, likely reactive.      3.  Pneumobilia with gas in the CBD, intrahepatic bile ducts as well as pancreatic ducts are of unclear etiology.      4. Small right pleural effusion with right basilar atelectasis.            IR-CONSULT AND TREAT    (Results Pending)   CT-DRAIN-PERITONEAL    (Results Pending)       Assessment: This is a 66 y.o. female with recurrent pancreatitis who recently underwent ERCP, sphincterotomy, biopsy, and ampullary dilatation.  She presents with bilious fluid collection in the right abdomen and surrounding the pancreas which appears to be contiguous.  She is hemodynamically stable with normal vital signs, and a benign exam at this time.      Recommendations:   -No acute indication for surgical intervention at this time  -Continue n.p.o., IV fluid resuscitation, and drain to gravity  -Recommend nonurgent discussion consultation with hepatobiliary surgery (Dr. Dumont) for any surgical recommendations  -It would be reasonable to contact gastroenterology to notify them of her admission and see if there are any further recommendations from a GI standpoint  -Continue to monitor clinically.  If the patient develops clinical deterioration she may require transfer to main hospital for further interventional, surgical, or critical care management.   -Call with questions/concerns/updates      Norris Figueroa M.D.  [facility]  [phone]     No

## 2021-12-06 NOTE — ED ADULT NURSE NOTE - NS ED NURSE TRANSPORT WITH
DELBERT---see note below. Spoke with pt. States she has a home INR machine and checks it weekly (doug Monday). Reports her INR was 3.5 today and she takes warfarin 4mg at bedtime.   States her INR range is 2.5-3.5 because she has \"clotted\" before when INR was cardiac monitor

## 2021-12-16 NOTE — H&P ADULT - WEIGHT IN LBS
Assistance with ambulation/Assistance OOB with selected safe patient handling equipment/Communicate Risk of Fall with Harm to all staff/Monitor for mental status changes/Monitor gait and stability/Reinforce activity limits and safety measures with patient and family/Tailored Fall Risk Interventions/Toileting schedule using arm’s reach rule for commode and bathroom/Use of alarms - bed, chair and/or voice tab/Visual Cue: Yellow wristband and red socks/Bed in lowest position, wheels locked, appropriate side rails in place/Call bell, personal items and telephone in reach/Instruct patient to call for assistance before getting out of bed or chair/Non-slip footwear when patient is out of bed/[place] to call system/Physically safe environment - no spills, clutter or unnecessary equipment/Purposeful Proactive Rounding/Room/bathroom lighting operational, light cord in reach 100

## 2022-04-30 RX ORDER — MEMANTINE HYDROCHLORIDE 10 MG/1
1 TABLET ORAL
Qty: 0 | Refills: 0 | DISCHARGE

## 2022-04-30 RX ORDER — CLOPIDOGREL BISULFATE 75 MG/1
1 TABLET, FILM COATED ORAL
Qty: 0 | Refills: 0 | DISCHARGE

## 2022-04-30 RX ORDER — ATORVASTATIN CALCIUM 80 MG/1
1 TABLET, FILM COATED ORAL
Qty: 0 | Refills: 0 | DISCHARGE

## 2022-04-30 RX ORDER — ASPIRIN/CALCIUM CARB/MAGNESIUM 324 MG
1 TABLET ORAL
Qty: 0 | Refills: 0 | DISCHARGE

## 2022-04-30 RX ORDER — METOPROLOL TARTRATE 50 MG
1 TABLET ORAL
Qty: 0 | Refills: 0 | DISCHARGE

## 2022-04-30 RX ORDER — ROSUVASTATIN CALCIUM 5 MG/1
5 TABLET ORAL
Qty: 0 | Refills: 0 | DISCHARGE

## 2022-04-30 RX ORDER — ESCITALOPRAM OXALATE 10 MG/1
1 TABLET, FILM COATED ORAL
Qty: 0 | Refills: 0 | DISCHARGE

## 2022-06-26 NOTE — PATIENT PROFILE ADULT. - ANESTHESIA, PREVIOUS REACTION, PROFILE
ED General Adult HPI





- General


Stated complaint: RESP DISTRESS


PUI?: No


Time Seen by Provider: 06/25/22 23:31


Source: patient, family (DAUGHTER), EMS





- History of Present Illness


Initial comments: 


THIS IS A PLEASANT 75 YEAR OLD FEMALE WITH MEDICAL HISTORY OF CHF, COPD, ASTHMA,

CAD, HTL, HLD BROUGHT IN BY EMS WITH CONCERN OF SHORTNESS OF BREATH.  ON EMS 

ARRIVAL, PATIENT WAS SATING AROUND MID 85 WITH ACCESSORY MUSCLE USE AND SPEAKS 1

WORD SENTENCES AND THEY PLACED PATIENT ON CPAP IMMEDIATELY.  





ON ARRIVAL, PATIENT WAS IMMEDIATELY SWITCHED TO BIPAP AND ON MY INITIAL 

EXAMINATION, PATIENT WITHOUT ACCESSORY MUSCLE USE.  PATIENT DENIES ANY OTHER 

SYMPTOMS.  Patient denies fever chill night sweat dizziness blurred vision 

lightheadedness headache tinnitus ear pain runny nose sore throat loss of taste 

loss of smell chest pain palpitation cough abdominal pain nausea vomiting 

diarrhea constipation joint pain muscle pain new rash and heat or cold 

intolerance.





MOST INFORMATION WAS OBTAINED FROM THE DAUGHTER.  PER DAUGHTER, PATIENT HAD A 

CHEST TUBE PLACED IN BECAUSE SHE ACCUMULATE FLUID A LOT AND FAST AND IT WAS 

PLACED AT [facility].  INITIALLY THEY WANTED TO GO THERE BUT THE 

EMS CREW SAID IT WAS FULL OVER THERE AND BROUGHT HERE HERE.





- Related Data


                                Home Medications











 Medication  Instructions  Recorded  Confirmed  Last Taken


 


Sertraline [Zoloft] 100 mg PO DAILY 08/06/15 07/30/20 08/21/17


 


Zolpidem [Ambien] 10 mg PO QHS 08/06/15 07/30/20 08/21/17


 


carvediloL [Coreg] 12.5 mg PO BID 08/06/15 07/30/20 08/22/17 05:30


 


lisinopriL [Zestril TAB] 20 mg PO DAILY 08/06/15 07/30/20 08/21/17





     b


 


AtorvaSTATin [Lipitor] 40 mg PO QHS 07/30/20 07/30/20 Unknown


 


Ergocalciferol (Vitamin D2) 50 mcg PO 1XW 07/30/20 07/30/20 Unknown





[Vitamin D2]    


 


amLODIPine 5 mg PO DAILY 07/30/20 07/30/20 Unknown


 


hydroCHLOROthiazide 25 mg PO DAILY 07/30/20 07/30/20 Unknown





[Hydrochlorothiazide]    








                                  Previous Rx's











 Medication  Instructions  Recorded  Last Taken  Type


 


Aspirin EC [Halfprin EC] 81 mg PO DAILY  tablet 08/01/20 Unknown Rx


 


Azithromycin [Zithromax Z-JAYME] 0 mg PO DAILY #1 tab 08/01/20 Unknown Rx


 


Benzonatate [Tessalon Perles] 100 mg PO Q8HR #30 capsule 08/01/20 Unknown Rx


 


Meclizine [Antivert] 25 mg PO TID PRN  tablet 08/01/20 Unknown Rx


 


Prednisone [predniSONE 10 mg 10 mg PO .TAPER #1 tab.ds.pk 08/01/20 Unknown Rx





(6-Day Pack, 21 Tabs)]    


 


Azithromycin [Zithromax] 250 mg PO DAILY 3 Days #3 tab 06/19/22 Unknown Rx


 


predniSONE [Deltasone] 40 mg PO QDAY 3 Days #3 tab 06/19/22 Unknown Rx











                                    Allergies











Allergy/AdvReac Type Severity Reaction Status Date / Time


 


codeine AdvReac  Dizziness Verified 06/18/22 08:23














ED Review of Systems


ROS: 


Stated complaint: RESP DISTRESS


Other details as noted in HPI





Comment: All other systems reviewed and negative


Constitutional: no symptoms reported


Eyes: as per HPI


ENT: as per HPI


Respiratory: shortness of breath, SOB with exertion, SOB at rest.  denies: 

cough, wheezing


Cardiovascular: as per HPI


Endocrine: no symptoms reported


Gastrointestinal: as per HPI


Genitourinary: as per HPI


Musculoskeletal: as per HPI


Skin: as per HPI


Neurological: as per HPI


Psychiatric: as per HPI


Hematological/Lymphatic: as per HPI





ED Past Medical Hx





- Past Medical History


Hx Hypertension: Yes


Hx Heart Attack/AMI: No


Hx Congestive Heart Failure: Yes


Hx GERD: Yes


Hx Renal Disease: No


Hx Arthritis: Yes (knees; had couple of injection)


Hx Psychiatric Treatment: Yes (Anxiety, Depression)


Hx Asthma: No


Hx COPD: No


Hx HIV: No


Additional medical history: high cholesterol





- Surgical History


Additional Surgical History: TUBAL LIGATION





- Social History


Smoking Status: Never Smoker





- Medications


Home Medications: 


                                Home Medications











 Medication  Instructions  Recorded  Confirmed  Last Taken  Type


 


Sertraline [Zoloft] 100 mg PO DAILY 08/06/15 07/30/20 08/21/17 History


 


Zolpidem [Ambien] 10 mg PO QHS 08/06/15 07/30/20 08/21/17 History


 


carvediloL [Coreg] 12.5 mg PO BID 08/06/15 07/30/20 08/22/17 05:30 History


 


lisinopriL [Zestril TAB] 20 mg PO DAILY 08/06/15 07/30/20 08/21/17 History





     b 


 


AtorvaSTATin [Lipitor] 40 mg PO QHS 07/30/20 07/30/20 Unknown History


 


Ergocalciferol (Vitamin D2) 50 mcg PO 1XW 07/30/20 07/30/20 Unknown History





[Vitamin D2]     


 


amLODIPine 5 mg PO DAILY 07/30/20 07/30/20 Unknown History


 


hydroCHLOROthiazide 25 mg PO DAILY 07/30/20 07/30/20 Unknown History





[Hydrochlorothiazide]     


 


Aspirin EC [Halfprin EC] 81 mg PO DAILY  tablet 08/01/20  Unknown Rx


 


Azithromycin [Zithromax Z-JAYME] 0 mg PO DAILY #1 tab 08/01/20  Unknown Rx


 


Benzonatate [Tessalon Perles] 100 mg PO Q8HR #30 capsule 08/01/20  Unknown Rx


 


Meclizine [Antivert] 25 mg PO TID PRN  tablet 08/01/20  Unknown Rx


 


Prednisone [predniSONE 10 mg 10 mg PO .TAPER #1 tab.ds.pk 08/01/20  Unknown Rx





(6-Day Pack, 21 Tabs)]     


 


Azithromycin [Zithromax] 250 mg PO DAILY 3 Days #3 tab 06/19/22  Unknown Rx


 


predniSONE [Deltasone] 40 mg PO QDAY 3 Days #3 tab 06/19/22  Unknown Rx














ED Physical Exam





- General


Limitations: No Limitations


General appearance: alert, in no apparent distress





- Head


Head exam: Present: atraumatic, normocephalic, normal inspection





- Eye


Eye exam: Present: normal appearance, PERRL, EOMI


Pupils: Present: normal accommodation





- ENT


ENT exam: Present: normal exam, mucous membranes moist





- Neck


Neck exam: Present: normal inspection, full ROM





- Respiratory


Respiratory exam: Present: respiratory distress, rales, rhonchi.  Absent: 

wheezes





- Cardiovascular


Cardiovascular Exam: Present: regular rate





- GI/Abdominal


GI/Abdominal exam: Present: soft





- Extremities Exam


Extremities exam: Present: normal inspection, full ROM, normal capillary refill





- Back Exam


Back exam: Present: normal inspection, full ROM





- Neurological Exam


Neurological exam: Present: alert, altered, oriented X3, CN II-XII intact





- Psychiatric


Psychiatric exam: Present: normal affect, normal mood





- Skin


Skin exam: Present: normal color





ED Course


                                   Vital Signs











  06/25/22 06/25/22 06/25/22





  23:18 23:29 23:30


 


Temperature 98 F  


 


Pulse Rate 82  


 


Respiratory 22  





Rate   


 


Blood Pressure 121/75  121/75


 


O2 Sat by Pulse 96 97 96





Oximetry   














  06/25/22 06/25/22 06/26/22





  23:44 23:45 00:01


 


Temperature   


 


Pulse Rate 77 71 74


 


Respiratory 23 24 26 H





Rate   


 


Blood Pressure 121/75 121/75 135/69


 


O2 Sat by Pulse 96 93 93





Oximetry   














  06/26/22 06/26/22 06/26/22





  00:15 00:31 00:45


 


Temperature   


 


Pulse Rate 77 71 74


 


Respiratory 17 17 18





Rate   


 


Blood Pressure 135/69 135/69 135/69


 


O2 Sat by Pulse 95 94 97





Oximetry   














  06/26/22 06/26/22 06/26/22





  01:01 01:15 01:31


 


Temperature   


 


Pulse Rate 80 75 75


 


Respiratory 17 23 17





Rate   


 


Blood Pressure 114/63 114/63 114/63


 


O2 Sat by Pulse 97 96 96





Oximetry   














  06/26/22 06/26/22 06/26/22





  01:45 02:01 02:15


 


Temperature   


 


Pulse Rate 70 76 70


 


Respiratory 18 19 16





Rate   


 


Blood Pressure 114/63 116/73 116/73


 


O2 Sat by Pulse 96 97 97





Oximetry   














  06/26/22 06/26/22 06/26/22





  02:31 02:45 03:01


 


Temperature   


 


Pulse Rate 70 73 68


 


Respiratory 22 16 21





Rate   


 


Blood Pressure 116/73 116/73 111/65


 


O2 Sat by Pulse 97 96 97





Oximetry   














  06/26/22 06/26/22 06/26/22





  03:15 03:31 03:37


 


Temperature   


 


Pulse Rate 66 67 75


 


Respiratory 20 17 22





Rate   


 


Blood Pressure 111/65 116/73 111/65


 


O2 Sat by Pulse 98 99 98





Oximetry   














  06/26/22 06/26/22 06/26/22





  03:43 03:44 03:56


 


Temperature 98.7 F  97.9 F


 


Pulse Rate   88


 


Respiratory  22 15





Rate   


 


Blood Pressure   143/73


 


O2 Sat by Pulse  98 100





Oximetry   














- Reevaluation(s)


Reevaluation #1: 





06/26/22 05:06


RECEIVED CALL FROM RADIOLOGIST THAT PATIENT HAVE SUBSEGMENTAL PE.  PATIENT 

CURRENTLY OFF BIPAP AND ON NASAL CANULA WITH NO ACCESSORY MUSCLE USE.  SPOKE TO 

DR. JIMÉNEZ WHO KINDLY ACCEPTED THE PATIENT.





ED Medical Decision Making





- Lab Data


Result diagrams: 


                                 06/25/22 23:41





                                 06/25/22 23:41


Critical care attestation.: 


If time is entered above; I have spent that time in minutes in the direct care 

of this critically ill patient, excluding procedure time.








ED Disposition


Clinical Impression: 


 Pulmonary embolism, Acute exacerbation of CHF (congestive heart failure)





Disposition: 09 ADMITTED AS INPATIENT


Is pt being admited?: Yes


Does the pt Need Aspirin: No


Condition: Stable


Referrals: 


PATRICA CASTELLANO MD [Primary Care Provider] - 3-5 Days


Time of Disposition: 05:08
none

## 2022-10-28 NOTE — PHYSICAL THERAPY INITIAL EVALUATION ADULT - RISK REDUCTION/PREVENTION, PT EVAL
FAMILY MEDICINE OFFICE VISIT      Patient: Rachna Mccullough Date of Service: 10/28/2022   : 1955 MRN: [id number]     SUBJECTIVE:     CHIEF COMPLAINT:  Chief Complaint   Patient presents with   • Follow-up     Follow up lab results per pcp request          HISTORY OF PRESENT ILLNESS:  Rachna Mccullough is a 66 year old female who presents today for follow up on UTI.    65 y/o F c/o vaginal and pelvic pains, slowed down before got out of the hospital, did not go away.  First antibiotic was getting better, started burning again.  Finished ciprofloxacin, no side effects was getting better but  Tuesday started burning again.  Burning vaginal are all the time.  Patient does not want to follow up with uro gyneolocgist.  Has BP machine at home, will check everyday.      PAST MEDICAL HISTORY:  Past Medical History:   Diagnosis Date   • Acute cystitis without hematuria 2021   • Anxiety    • Arthritis    • Cataract    • COPD    • Depression    • Depressive disorder    • GERD    • Myocardial infarction (CMS/HCC)    • Osteoporosis        MEDICATIONS:  Current Outpatient Medications   Medication Sig   • nitrofurantoin, macrocrystal-monohydrate, (MACROBID) 100 MG capsule Take 1 capsule by mouth in the morning and 1 capsule in the evening. Do all this for 7 days.   • traMADol (ULTRAM) 50 MG tablet Take 50 mg by mouth every 6 hours as needed for Pain.   • ciprofloxacin (CIPRO) 0.3 % ophthalmic solution    • ketorolac (ACULAR) 0.5 % ophthalmic solution    • prednisoLONE acetate (PRED FORTE) 1 % ophthalmic suspension INSTILL ONE DROP FOUR TIMES DAILY TO OPERATED EYE TO BE STARTED BY MD THE DAY AFTER SURGERY 3/31/2022   • estrogens, conjugated (Premarin) vaginal cream Place 1 g vaginally 2 days a week.   • pantoprazole (PROTONIX) 40 MG tablet TAKE 1 TABLET BY MOUTH DAILY   • ondansetron (Zofran) 4 MG tablet Take 1 tablet by mouth every 8 hours as needed for Nausea.   • DULoxetine (CYMBALTA) 60 MG capsule    • lidocaine  (LIDODERM) 5 % patch APPLY 1 PATCH EVERY 24 HOURS   • buPROPion XL (WELLBUTRIN XL) 150 MG 24 hr tablet Take 150 mg by mouth every 24 hours.     No current facility-administered medications for this visit.       ALLERGIES:  ALLERGIES:   Allergen Reactions   • Aspirin ANAPHYLAXIS     Other reaction(s): ANAPHYLAXIS   • Morphine Other (See Comments)     Abdominal pain  And vomiting    • Opioid Analgesics Nausea & Vomiting     abd discomfort  abd discomfort    abd discomfort    abd discomfort  abd discomfort  abd discomfort  abd discomfort    abd discomfort   • Penicillins ANAPHYLAXIS and Other (See Comments)     Other reaction(s): ANAPHYLAXIS, aspirin, Bananas, codeine, morphine   • Avocado   (Food Or Med) Nausea & Vomiting   • Banana   (Food And Med) GI UPSET   • Banana Flavor   (Food Or Med) GI UPSET       PAST SURGICAL HISTORY:  Past Surgical History:   Procedure Laterality Date   • Appendectomy     • External ear surgery Right 11 years old    abscess behind the right ear    • Eye surgery Right 03/2022    Cataract   • Gallbladder surgery Right    • Lumbar discectomy  01/01/2014   • Removal gallbladder     • Spine surgery         FAMILY HISTORY:  Family History   Problem Relation Age of Onset   • Myocardial Infarction Mother 62   • Myocardial Infarction Father 64   • Myocardial Infarction Maternal Grandmother    • Myocardial Infarction Maternal Grandfather    • Dementia/Alzheimers Maternal Grandfather    • Myocardial Infarction Maternal Uncle 47       SOCIAL HISTORY:  Social History     Tobacco Use   • Smoking status: Former Smoker     Types: Cigarettes   • Smokeless tobacco: Never Used   • Tobacco comment: quit 2013, smoking E cigarrette    Vaping Use   • Vaping Use: Every day   • Substances: Nicotine, THC, CBD   • Devices: Pre-filled or refillable cartridge   • Passive vaping exposure: Yes   Substance Use Topics   • Alcohol use: Not Currently     Comment: occasional    • Drug use: Yes     Types: Marijuana      Comment: because nothing else for abdominal and chest pains       IMMUNIZATION:  Immunization History   Administered Date(s) Administered   • Shingrix (Shingles Zoster) 05/26/2022, 08/04/2022     Review of Systems   Constitutional: Negative for chills, fatigue, fever and unexpected weight change.   HENT: Negative for trouble swallowing.    Respiratory: Negative for cough, chest tightness, shortness of breath and wheezing.    Cardiovascular: Negative for chest pain and leg swelling.   Genitourinary: Negative for hematuria.        Per HPI   Neurological: Negative for speech difficulty.         OBJECTIVE:     Physical Exam  Vitals and nursing note reviewed.   Constitutional:       General: She is not in acute distress.     Appearance: She is well-developed. She is not toxic-appearing or diaphoretic.   HENT:      Head: Normocephalic and atraumatic.      Right Ear: External ear normal.      Left Ear: External ear normal.   Eyes:      General: No scleral icterus.        Right eye: No discharge.         Left eye: No discharge.      Conjunctiva/sclera: Conjunctivae normal.      Pupils: Pupils are equal, round, and reactive to light.   Neck:      Trachea: No tracheal deviation.   Cardiovascular:      Rate and Rhythm: Normal rate and regular rhythm.      Heart sounds: Normal heart sounds.   Pulmonary:      Effort: Pulmonary effort is normal. No respiratory distress.      Breath sounds: Normal breath sounds. No stridor. No wheezing or rales.   Chest:   Breasts: Breasts are symmetrical.      Right: Normal. No swelling, bleeding, inverted nipple, mass, nipple discharge, skin change or tenderness.      Left: Normal. No swelling, bleeding, inverted nipple, mass, nipple discharge, skin change or tenderness.       Abdominal:      General: Bowel sounds are normal. There is no distension.      Palpations: Abdomen is soft.      Tenderness: There is no abdominal tenderness. There is no right CVA tenderness, left CVA tenderness, guarding  or rebound.   Musculoskeletal:      Cervical back: Normal range of motion and neck supple.      Right lower leg: No edema.      Left lower leg: No edema.   Lymphadenopathy:      Upper Body:      Right upper body: No supraclavicular or axillary adenopathy.      Left upper body: No supraclavicular or axillary adenopathy.   Skin:     General: Skin is warm.      Coloration: Skin is not pale.      Findings: No erythema or rash.   Neurological:      Mental Status: She is alert and oriented to person, place, and time.      Cranial Nerves: No cranial nerve deficit.      Coordination: Coordination normal.         Visit Vitals  BP (!) 137/91 (BP Location: RUE - Right upper extremity, Patient Position: Sitting, Cuff Size: Regular)   Pulse 85   Temp 97 °F (36.1 °C) (Tympanic)   Resp 18   Ht 5' 3\" (1.6 m)   Wt 57.9 kg (127 lb 8.6 oz)   LMP  (LMP Unknown)   SpO2 100%   BMI 22.59 kg/m²         Wt Readings from Last 1 Encounters:   10/28/22 57.9 kg (127 lb 8.6 oz)          DIAGNOSTIC STUDIES:     LAB RESULTS:    Lab Results Reviewed and   Office Visit on 10/28/2022   Component Date Value Ref Range Status   • POCT Color 10/28/2022 Yellow   Final   • POCT Appearance 10/28/2022 Clear   Final   • POCT Glucose Urine 10/28/2022 Negative  Negative mg/dL Final   • POCT Bilirubin 10/28/2022 Negative  Negative Final   • POCT Ketones 10/28/2022 Negative  Negative mg/dL Final   • POCT Specific Gravity 10/28/2022 1.020  1.005 - 1.030 Final   • POCT Occult Blood 10/28/2022 Trace - Lysed (A) Negative Final   • POCT pH 10/28/2022 6.5  5 - 7 Final   • POCT Protein 10/28/2022 Negative  Negative mg/dL Final   • POCT Urobilinogen 10/28/2022 0.2  0.0 - 1.0 mg/dL Final   • Urine Nitrite 10/28/2022 Negative  Negative Final   • WBC (Leukocyte) Esterase POC 10/28/2022 Trace (A) Negative Final   Lab Services on 10/20/2022   Component Date Value Ref Range Status   • Sodium 10/20/2022 138  135 - 145 mmol/L Final   • Potassium 10/20/2022 3.4  3.4 - 5.1 mmol/L  Final   • Chloride 10/20/2022 102  97 - 110 mmol/L Final   • Carbon Dioxide 10/20/2022 30  21 - 32 mmol/L Final   • Anion Gap 10/20/2022 9  7 - 19 mmol/L Final   • Glucose 10/20/2022 93  70 - 99 mg/dL Final   • BUN 10/20/2022 7  6 - 20 mg/dL Final   • Creatinine 10/20/2022 0.46 (A) 0.51 - 0.95 mg/dL Final   • Glomerular Filtration Rate 10/20/2022 >90  >=60 Final   • BUN/ Creatinine Ratio 10/20/2022 15  7 - 25 Final   • Calcium 10/20/2022 9.8  8.4 - 10.2 mg/dL Final   • Bilirubin, Total 10/20/2022 0.5  0.2 - 1.0 mg/dL Final   • GOT/AST 10/20/2022 11  <=37 Units/L Final   • GPT/ALT 10/20/2022 17  <64 Units/L Final   • Alkaline Phosphatase 10/20/2022 70  45 - 117 Units/L Final   • Albumin 10/20/2022 4.1  3.6 - 5.1 g/dL Final   • Protein, Total 10/20/2022 7.6  6.4 - 8.2 g/dL Final   • Globulin 10/20/2022 3.5  2.0 - 4.0 g/dL Final   • A/G Ratio 10/20/2022 1.2  1.0 - 2.4 Final   • Iron 10/20/2022 49 (A) 50 - 170 mcg/dL Final   • Iron Binding Capacity 10/20/2022 325  250 - 450 mcg/dL Final   • Iron, Percent Saturation 10/20/2022 15  15 - 45 % Final   • Vitamin B12 10/20/2022 1,390 (A) 211 - 911 pg/mL Final   • Folate 10/20/2022 6.2  >=5.5 ng/mL Final   • WBC 10/20/2022 6.8  4.2 - 11.0 K/mcL Final   • RBC 10/20/2022 4.08  4.00 - 5.20 mil/mcL Final   • HGB 10/20/2022 12.3  12.0 - 15.5 g/dL Final   • HCT 10/20/2022 38.0  36.0 - 46.5 % Final   • MCV 10/20/2022 93.1  78.0 - 100.0 fl Final   • MCH 10/20/2022 30.1  26.0 - 34.0 pg Final   • MCHC 10/20/2022 32.4  32.0 - 36.5 g/dL Final   • RDW-CV 10/20/2022 14.2  11.0 - 15.0 % Final   • RDW-SD 10/20/2022 48.9  39.0 - 50.0 fL Final   • PLT 10/20/2022 359  140 - 450 K/mcL Final   • NRBC 10/20/2022 0  <=0 /100 WBC Final   • Neutrophil, Percent 10/20/2022 46  % Final   • Lymphocytes, Percent 10/20/2022 44  % Final   • Mono, Percent 10/20/2022 8  % Final   • Eosinophils, Percent 10/20/2022 1  % Final   • Basophils, Percent 10/20/2022 1  % Final   • Immature Granulocytes 10/20/2022 0  %  Final   • Absolute Neutrophils 10/20/2022 3.2  1.8 - 7.7 K/mcL Final   • Absolute Lymphocytes 10/20/2022 3.0  1.0 - 4.0 K/mcL Final   • Absolute Monocytes 10/20/2022 0.6  0.3 - 0.9 K/mcL Final   • Absolute Eosinophils  10/20/2022 0.0  0.0 - 0.5 K/mcL Final   • Absolute Basophils 10/20/2022 0.0  0.0 - 0.3 K/mcL Final   • Absolute Immmature Granulocytes 10/20/2022 0.0  0.0 - 0.2 K/mcL Final   Office Visit on 10/20/2022   Component Date Value Ref Range Status   • POCT Color 10/20/2022 Yellow   Final   • POCT Appearance 10/20/2022 Cloudy   Final   • POCT Glucose Urine 10/20/2022 Negative  Negative mg/dL Final   • POCT Bilirubin 10/20/2022 Negative  Negative Final   • POCT Ketones 10/20/2022 2+ (50 mg/dl)  Negative mg/dL Final   • POCT Specific Gravity 10/20/2022 1.020  1.005 - 1.030 Final   • POCT Occult Blood 10/20/2022 Moderate (A) Negative Final   • POCT pH 10/20/2022 6.5  5 - 7 Final   • POCT Protein 10/20/2022 Negative  Negative mg/dL Final   • POCT Urobilinogen 10/20/2022 0.2  0.0 - 1.0 mg/dL Final   • Urine Nitrite 10/20/2022 Positive (A) Negative Final   • WBC (Leukocyte) Esterase POC 10/20/2022 Large (A) Negative Final   • Urine, Bacterial Culture 10/20/2022 >100,000 CFU/mL Escherichia coli (A)  Final   • COLOR, URINALYSIS 10/20/2022 Yellow   Final   • APPEARANCE, URINALYSIS 10/20/2022 Cloudy   Final   • GLUCOSE, URINALYSIS 10/20/2022 Negative  Negative mg/dL Final   • BILIRUBIN, URINALYSIS 10/20/2022 Negative  Negative Final   • KETONES, URINALYSIS 10/20/2022 20  (A) Negative mg/dL Final   • SPECIFIC GRAVITY, URINALYSIS 10/20/2022 1.009  1.005 - 1.030 Final   • OCCULT BLOOD, URINALYSIS 10/20/2022 Moderate (A) Negative Final   • PH, URINALYSIS 10/20/2022 6.0  5.0 - 7.0 Final   • PROTEIN, URINALYSIS 10/20/2022 30  (A) Negative mg/dL Final   • UROBILINOGEN, URINALYSIS 10/20/2022 0.2  0.2, 1.0 mg/dL Final   • NITRITE, URINALYSIS 10/20/2022 Positive (A) Negative Final   • LEUKOCYTE ESTERASE, URINALYSIS 10/20/2022  Large (A) Negative Final   • SQUAMOUS EPITHELIAL, URINALYSIS 10/20/2022 None Seen  None Seen, 1 to 5 /hpf Final   • ERYTHROCYTES, URINALYSIS 10/20/2022 3 to 5 (A) None Seen, 1 to 2 /hpf Final   • LEUKOCYTES, URINALYSIS 10/20/2022 >100 (A) None Seen, 1 to 5 /hpf Final   • BACTERIA, URINALYSIS 10/20/2022 Moderate (A) None Seen /hpf Final   • HYALINE CASTS, URINALYSIS 10/20/2022 None Seen  None Seen, 1 to 5 /lpf Final   Hospital Outpatient Visit on 08/15/2022   Component Date Value Ref Range Status   • Case Report 08/15/2022    Final                    Value:Surgical Pathology                                Case: [id number]                                  Authorizing Provider:  Parth Ren DO         Collected:           08/15/2022 0955              Ordering Location:     [facility]           Received:            08/15/2022 1332                                     GASTROENTEROLOGY                                                             Pathologist:           Nichelle Barrientos MD                                                                 Specimens:   A) - Small Intestine, Duodenum                                                                      B) - Stomach, Antrum, Antrum                                                                        C) - Large Intestine, Transverse colon polyp                                              • Pathologic Diagnosis 08/15/2022    Final                    Value:This result contains rich text formatting which cannot be displayed here.   • Clinical Information 08/15/2022    Final                    Value:This result contains rich text formatting which cannot be displayed here.   • Gross Description 08/15/2022    Final                    Value:This result contains rich text formatting which cannot be displayed here.   • Disclaimer 08/15/2022    Final                    Value:This result contains rich text formatting which cannot be displayed here.   Lab Services on  08/12/2022   Component Date Value Ref Range Status   • SARS-CoV-2 by PCR 08/12/2022 Not Detected  Not Detected / Detected / Inhibitor Present Final   • Isolation Guidelines 08/12/2022    Final                    Value:This result contains rich text formatting which cannot be displayed here.   • Procedural Notes 08/12/2022    Final                    Value:This result contains rich text formatting which cannot be displayed here.        Assessment AND PLAN:     Diagnoses and all orders for this visit:  Recurrent UTI  -     POCT URINE DIP AUTO  -     nitrofurantoin, macrocrystal-monohydrate, (MACROBID) 100 MG capsule; Take 1 capsule by mouth in the morning and 1 capsule in the evening. Do all this for 7 days.  Pelvic pain in female  -     SERVICE TO OB GYN  Recurrent UTI: discussed urine culture positive for E.coli,  Will treat with nitrofurantoin, discussed risks and benefits,  Side effects including pulmonary toxicity discussed with patient.  Discussed with patient abnormal urine results including blood in urine.  Recommended referral to urologist.  Pelvic pain: referral to gynecologist.  Elevated blood pressure: monitor at home daily,   bring home readings for follow up.    Return in about 3 weeks (around 11/18/2022), or if symptoms worsen or fail to improve.    Instructions provided as documented in the Visit Summary.    Medical compliance with plan discussed and risk of noncompliance reviewed.    Patient education completed on disease process, etiology and prognosis.    Return to clinic as clinically indicated was discussed with patient who verbalized understanding of and agreement with the plan.    Proper usage and all side effects of medications reviewed with patient who expressed understanding.       Karon Purvis MD  10/29/2022    risk factors

## 2022-11-04 NOTE — ED ADULT NURSE NOTE - CHIEF COMPLAINT
The patient is a 89y Female complaining of Cyclosporine Counseling:  I discussed with the patient the risks of cyclosporine including but not limited to hypertension, gingival hyperplasia,myelosuppression, immunosuppression, liver damage, kidney damage, neurotoxicity, lymphoma, and serious infections. The patient understands that monitoring is required including baseline blood pressure, CBC, CMP, lipid panel and uric acid, and then 1-2 times monthly CMP and blood pressure.

## 2023-02-07 NOTE — DISCHARGE NOTE ADULT - BECAUSE OF A PHYSICAL, MENTAL OR EMOTIONAL CONDITION, DO YOU HAVE DIFFICULTY DOING  ERRANDS ALONE LIKE VISITING A DOCTOR'S OFFICE OR SHOPPING (15 YEARS AND OLDER)
"Chief Complaint   Patient presents with     Physical     With Pap     Initial /70 (BP Location: Right arm, Patient Position: Sitting, Cuff Size: Adult Regular)   Pulse 76   Temp 98.2  F (36.8  C) (Oral)   Resp 16   Ht 1.518 m (4' 11.75\")   Wt 57.7 kg (127 lb 3.2 oz)   LMP 02/03/2023   SpO2 100%   BMI 25.05 kg/m   Estimated body mass index is 25.05 kg/m  as calculated from the following:    Height as of this encounter: 1.518 m (4' 11.75\").    Weight as of this encounter: 57.7 kg (127 lb 3.2 oz).  BP completed using cuff size regular right arm    Lisa Magill, CMA    " Yes

## 2023-10-26 NOTE — H&P ADULT - NSHPPOAPRESSUREULCER_GEN_ALL_CORE
Bleeding that does not stop/Swelling that gets worse/Pain not relieved by Medications/Fever greater than (need to indicate Fahrenheit or Celsius) no

## 2024-01-31 NOTE — ED PROCEDURE NOTE - CPROC ED ANATOMIC LOCATION1
left forehead Vital Signs Last 24 Hrs  T(C): 37.2 (01-31-24 @ 09:05), Max: 37.9 (01-30-24 @ 15:39)  T(F): 98.9 (01-31-24 @ 09:05), Max: 100.2 (01-30-24 @ 15:39)  HR: 102 (01-31-24 @ 09:05) (70 - 104)  BP: 112/77 (01-31-24 @ 09:05) (90/61 - 127/91)  BP(mean): --  RR: 18 (01-31-24 @ 09:05) (16 - 18)  SpO2: 97% (01-30-24 @ 15:39) (97% - 97%)

## 2024-02-05 NOTE — PHYSICAL THERAPY INITIAL EVALUATION ADULT - CRITERIA FOR SKILLED THERAPEUTIC INTERVENTIONS
functional limitations in following categories/impairments found Denies h/o or family h/o DVT, PE  Denies bleeding or clotting disorders  Denies dentures/partials, loose teeth/caps

## 2024-02-13 NOTE — PROGRESS NOTE ADULT - PROBLEM SELECTOR PLAN 2
Patient is only needing the strips, she already has a reader. Script pending, thanks.    increase metoprolol to 25mg bid (home dose).  monitor hemodynamics.   c/w tele monitoring.

## 2024-03-12 PROBLEM — H40.9 UNSPECIFIED GLAUCOMA: Chronic | Status: ACTIVE | Noted: 2020-01-21

## 2024-05-29 NOTE — DIETITIAN INITIAL EVALUATION ADULT. - PROBLEM/PLAN-5
Patient called to request labs that are needed prior to infusion, she is going out town and wants to complete labs prior to leaving, please call, thank you    DISPLAY PLAN FREE TEXT

## 2024-09-13 NOTE — H&P ADULT - FAMILY HISTORY
Recent PHQ 2/9 Score    PHQ 2:  PHQ 2 Score Adult PHQ 2 Score Adult PHQ 2 Interpretation Little interest or pleasure in activity?   9/13/2024  11:36 AM 0 No further screening needed 0       PHQ 9:     PHQ-2/9 Depression Screening  Little interest or pleasure in activity?: Not at all  Feeling down, depressed or hopeless?: Not at all  Initial depression screening score:: 0  PHQ2 Interpretation: No further screening needed      No pertinent family history in first degree relatives

## 2025-02-10 NOTE — PROGRESS NOTE ADULT - I WAS PHYSICALLY PRESENT FOR THE KEY PORTIONS OF THE EVALUATION AND MANAGEMENT (E/M) SERVICE PROVIDED.  I AGREE WITH THE ABOVE HISTORY, PHYSICAL, AND PLAN WHICH I HAVE REVIEWED AND EDITED WHERE APPROPRIATE
Mrs. Kamara returns today for 6-month follow-up.  I last saw her on 8/12/2024 and at that time she reported her nausea was improved, she was able to tolerate 2 cans of nestled Nutren 2.0 a day along with some solid foods.  She was seen by registered dietitian on 9/6/2024 who recommended continuing nutritional needs via PEG tube.  Send note to Dr. Molina.  Note: I recommended to continue tube feeds with goal of 4 cans a day and would consider PEG tube removal once she begins to gain some weight without needing the PEG tube for hydration or nutrition.    Prior history is summarized below: -CT revealed diverticulosis, severe atherosclerotic changes with a lower thoracic penetrating atherosclerotic ulcer. -A PEG tube was placed on 7/5/2024 - she was started on tube feeds and recommended 4 times a day feeds.  -5/30/2024 MBS: Aspiration and penetration on thin and nectar consistencies. Statement Selected

## 2025-07-08 NOTE — CHART NOTE - NSCHARTNOTEFT_GEN_A_CORE
spoke with daughter, Maria De Jesus  updated regarding today's event  ID f/u  cards monitoring  states again she does not want TAVR Patient unable to complete